# Patient Record
Sex: MALE | Race: BLACK OR AFRICAN AMERICAN | NOT HISPANIC OR LATINO | Employment: OTHER | URBAN - METROPOLITAN AREA
[De-identification: names, ages, dates, MRNs, and addresses within clinical notes are randomized per-mention and may not be internally consistent; named-entity substitution may affect disease eponyms.]

---

## 2019-12-16 ENCOUNTER — APPOINTMENT (OUTPATIENT)
Dept: CT IMAGING | Facility: HOSPITAL | Age: 74
End: 2019-12-16

## 2019-12-16 ENCOUNTER — APPOINTMENT (OUTPATIENT)
Dept: GENERAL RADIOLOGY | Facility: HOSPITAL | Age: 74
End: 2019-12-16

## 2019-12-16 ENCOUNTER — HOSPITAL ENCOUNTER (EMERGENCY)
Facility: HOSPITAL | Age: 74
Discharge: NURSING, REHAB OR LONG-TERM CARE | End: 2019-12-16
Attending: EMERGENCY MEDICINE | Admitting: EMERGENCY MEDICINE

## 2019-12-16 VITALS
OXYGEN SATURATION: 97 % | HEART RATE: 102 BPM | HEIGHT: 67 IN | BODY MASS INDEX: 18.83 KG/M2 | RESPIRATION RATE: 15 BRPM | DIASTOLIC BLOOD PRESSURE: 85 MMHG | TEMPERATURE: 97.9 F | WEIGHT: 120 LBS | SYSTOLIC BLOOD PRESSURE: 135 MMHG
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
## 2019-12-16 DIAGNOSIS — R53.1 RIGHT SIDED WEAKNESS: ICD-10-CM

## 2019-12-16 LAB
ALBUMIN SERPL-MCNC: 3.3 G/DL (ref 3.5–5.2)
ALBUMIN/GLOB SERPL: 0.8 G/DL
ALP SERPL-CCNC: 52 U/L (ref 39–117)
ALT SERPL W P-5'-P-CCNC: 16 U/L (ref 1–41)
ANION GAP SERPL CALCULATED.3IONS-SCNC: 13.4 MMOL/L (ref 5–15)
AST SERPL-CCNC: 39 U/L (ref 1–40)
BASOPHILS # BLD AUTO: 0.03 10*3/MM3 (ref 0–0.2)
BASOPHILS NFR BLD AUTO: 0.3 % (ref 0–1.5)
BILIRUB SERPL-MCNC: 0.4 MG/DL (ref 0.2–1.2)
BUN BLD-MCNC: 23 MG/DL (ref 8–23)
BUN/CREAT SERPL: 31.9 (ref 7–25)
CALCIUM SPEC-SCNC: 10.2 MG/DL (ref 8.6–10.5)
CHLORIDE SERPL-SCNC: 101 MMOL/L (ref 98–107)
CK SERPL-CCNC: 168 U/L (ref 20–200)
CO2 SERPL-SCNC: 25.6 MMOL/L (ref 22–29)
CREAT BLD-MCNC: 0.72 MG/DL (ref 0.76–1.27)
DEPRECATED RDW RBC AUTO: 41.6 FL (ref 37–54)
EOSINOPHIL # BLD AUTO: 0.02 10*3/MM3 (ref 0–0.4)
EOSINOPHIL NFR BLD AUTO: 0.2 % (ref 0.3–6.2)
ERYTHROCYTE [DISTWIDTH] IN BLOOD BY AUTOMATED COUNT: 13.7 % (ref 12.3–15.4)
GFR SERPL CREATININE-BSD FRML MDRD: 129 ML/MIN/1.73
GLOBULIN UR ELPH-MCNC: 4.4 GM/DL
GLUCOSE BLD-MCNC: 118 MG/DL (ref 65–99)
HCT VFR BLD AUTO: 35.8 % (ref 37.5–51)
HGB BLD-MCNC: 11.4 G/DL (ref 13–17.7)
IMM GRANULOCYTES # BLD AUTO: 0.04 10*3/MM3 (ref 0–0.05)
IMM GRANULOCYTES NFR BLD AUTO: 0.4 % (ref 0–0.5)
LYMPHOCYTES # BLD AUTO: 1.16 10*3/MM3 (ref 0.7–3.1)
LYMPHOCYTES NFR BLD AUTO: 11.9 % (ref 19.6–45.3)
MCH RBC QN AUTO: 26.7 PG (ref 26.6–33)
MCHC RBC AUTO-ENTMCNC: 31.8 G/DL (ref 31.5–35.7)
MCV RBC AUTO: 83.8 FL (ref 79–97)
MONOCYTES # BLD AUTO: 0.91 10*3/MM3 (ref 0.1–0.9)
MONOCYTES NFR BLD AUTO: 9.3 % (ref 5–12)
NEUTROPHILS # BLD AUTO: 7.61 10*3/MM3 (ref 1.7–7)
NEUTROPHILS NFR BLD AUTO: 77.9 % (ref 42.7–76)
NRBC BLD AUTO-RTO: 0.1 /100 WBC (ref 0–0.2)
PLATELET # BLD AUTO: 287 10*3/MM3 (ref 140–450)
PMV BLD AUTO: 11.6 FL (ref 6–12)
POTASSIUM BLD-SCNC: 5.3 MMOL/L (ref 3.5–5.2)
PROT SERPL-MCNC: 7.7 G/DL (ref 6–8.5)
RBC # BLD AUTO: 4.27 10*6/MM3 (ref 4.14–5.8)
SODIUM BLD-SCNC: 140 MMOL/L (ref 136–145)
WBC NRBC COR # BLD: 9.77 10*3/MM3 (ref 3.4–10.8)

## 2019-12-16 PROCEDURE — 70450 CT HEAD/BRAIN W/O DYE: CPT

## 2019-12-16 PROCEDURE — 85025 COMPLETE CBC W/AUTO DIFF WBC: CPT | Performed by: EMERGENCY MEDICINE

## 2019-12-16 PROCEDURE — 80053 COMPREHEN METABOLIC PANEL: CPT | Performed by: EMERGENCY MEDICINE

## 2019-12-16 PROCEDURE — 82550 ASSAY OF CK (CPK): CPT | Performed by: EMERGENCY MEDICINE

## 2019-12-16 PROCEDURE — 71045 X-RAY EXAM CHEST 1 VIEW: CPT

## 2019-12-16 PROCEDURE — 36415 COLL VENOUS BLD VENIPUNCTURE: CPT

## 2019-12-16 PROCEDURE — 99284 EMERGENCY DEPT VISIT MOD MDM: CPT

## 2019-12-16 PROCEDURE — 72170 X-RAY EXAM OF PELVIS: CPT

## 2019-12-16 RX ORDER — SODIUM CHLORIDE 0.9 % (FLUSH) 0.9 %
10 SYRINGE (ML) INJECTION AS NEEDED
Status: DISCONTINUED | OUTPATIENT
Start: 2019-12-16 | End: 2019-12-16

## 2019-12-16 NOTE — ED TRIAGE NOTES
Found on floor this am.  Nki.  Is on eliquis so nh sent him here.  Right side flacidity and aphasia at baseline

## 2019-12-16 NOTE — ED NOTES
Pt from [facility]. Was found on the floor by staff for an unwitnessed fall. Unknown amount of time spent on floor. Pt takes eliquis. Aphasic and left sided flacid at baseline. Pt awake and alert but unable to respond to questions      Dai Cason, RN  12/16/19 9265

## 2019-12-16 NOTE — ED PROVIDER NOTES
EMERGENCY DEPARTMENT ENCOUNTER    CHIEF COMPLAINT  Chief Complaint: fall  History given by: nursing home  History limited by: aphasia  Room Number: 06/06  PMD: Enrique Carver MD      HPI:  Pt is a 74 y.o. male with chronic R sided weakness and aphasia at baseline who presents to the ED via EMS after an unwitnessed fall, and pt was found by nursing home staff on the floor this morning. NH did not report pt having injury. They were not able to tell us how long pt was down. Pt is anticoagulated on Eliquis secondary to prior stroke. History limited by pt being nonverbal.    MEDICAL RECORD REVIEW    No prior records here.    PAST MEDICAL HISTORY  Active Ambulatory Problems     Diagnosis Date Noted   • No Active Ambulatory Problems     Resolved Ambulatory Problems     Diagnosis Date Noted   • No Resolved Ambulatory Problems     No Additional Past Medical History       PAST SURGICAL HISTORY  No past surgical history on file.    FAMILY HISTORY  No family history on file.    SOCIAL HISTORY  Social History     Socioeconomic History   • Marital status:      Spouse name: Not on file   • Number of children: Not on file   • Years of education: Not on file   • Highest education level: Not on file       ALLERGIES  Patient has no known allergies.    REVIEW OF SYSTEMS  Review of Systems   Unable to perform ROS: Patient nonverbal     All systems reviewed and negative except for those discussed in HPI.    PHYSICAL EXAM  ED Triage Vitals [12/16/19 1549]   Temp Heart Rate Resp BP SpO2   97.9 °F (36.6 °C) 91 18 122/76 99 %      Temp src Heart Rate Source Patient Position BP Location FiO2 (%)   Tympanic Monitor -- -- --       Physical Exam   Constitutional: No distress.   HENT:   Head: Normocephalic and atraumatic.   No signs of trauma   Eyes: Pupils are equal, round, and reactive to light. EOM are normal.   Neck: Normal range of motion. Neck supple.   Cardiovascular: Normal rate, regular rhythm and normal heart sounds.    Pulmonary/Chest: Effort normal and breath sounds normal. No respiratory distress.   Abdominal: Soft. There is no tenderness. There is no rebound and no guarding.   G tube in place   Musculoskeletal: Normal range of motion. He exhibits no edema or deformity.   Neurological: He is alert. He has normal sensation and normal strength.   Nonverbal. Pt does not move RUE or RLE. Does move LUE and LLE spontaneously.   Skin: Skin is warm and dry.   Several superficial ulcers to L 1st, 2nd, and 3rd toes without signs of infection. Fungating growth on R side of face.   Psychiatric: Mood and affect normal.   Nursing note and vitals reviewed.      LAB RESULTS  Lab Results (last 24 hours)     Procedure Component Value Units Date/Time    CBC & Differential [[id number]] Collected:  12/16/19 1817    Specimen:  Blood Updated:  12/16/19 1833    Narrative:       The following orders were created for panel order CBC & Differential.  Procedure                               Abnormality         Status                     ---------                               -----------         ------                     CBC Auto Differential[[id number]]        Abnormal            Final result                 Please view results for these tests on the individual orders.    Comprehensive Metabolic Panel [[id number]]  (Abnormal) Collected:  12/16/19 1817    Specimen:  Blood Updated:  12/16/19 1900     Glucose 118 mg/dL      BUN 23 mg/dL      Creatinine 0.72 mg/dL      Sodium 140 mmol/L      Potassium 5.3 mmol/L      Comment: Specimen hemolyzed.  Results may be affected.        Chloride 101 mmol/L      CO2 25.6 mmol/L      Calcium 10.2 mg/dL      Total Protein 7.7 g/dL      Albumin 3.30 g/dL      ALT (SGPT) 16 U/L      Comment: Specimen hemolyzed.  Results may be affected.        AST (SGOT) 39 U/L      Comment: Specimen hemolyzed.  Results may be affected.        Alkaline Phosphatase 52 U/L      Total Bilirubin 0.4 mg/dL      eGFR   Amer 129  mL/min/1.73      Globulin 4.4 gm/dL      A/G Ratio 0.8 g/dL      BUN/Creatinine Ratio 31.9     Anion Gap 13.4 mmol/L     Narrative:       GFR Normal >60  Chronic Kidney Disease <60  Kidney Failure <15      CK [[id number]]  (Normal) Collected:  12/16/19 1817    Specimen:  Blood Updated:  12/16/19 1900     Creatine Kinase 168 U/L      Comment: Specimen hemolyzed.  Results may be affected.       CBC Auto Differential [[id number]]  (Abnormal) Collected:  12/16/19 1817    Specimen:  Blood Updated:  12/16/19 1833     WBC 9.77 10*3/mm3      RBC 4.27 10*6/mm3      Hemoglobin 11.4 g/dL      Hematocrit 35.8 %      MCV 83.8 fL      MCH 26.7 pg      MCHC 31.8 g/dL      RDW 13.7 %      RDW-SD 41.6 fl      MPV 11.6 fL      Platelets 287 10*3/mm3      Neutrophil % 77.9 %      Lymphocyte % 11.9 %      Monocyte % 9.3 %      Eosinophil % 0.2 %      Basophil % 0.3 %      Immature Grans % 0.4 %      Neutrophils, Absolute 7.61 10*3/mm3      Lymphocytes, Absolute 1.16 10*3/mm3      Monocytes, Absolute 0.91 10*3/mm3      Eosinophils, Absolute 0.02 10*3/mm3      Basophils, Absolute 0.03 10*3/mm3      Immature Grans, Absolute 0.04 10*3/mm3      nRBC 0.1 /100 WBC           I ordered the above labs and reviewed the results.    RADIOLOGY  CT Head Without Contrast   Final Result       No acute intracranial hemorrhage is identified within limitations   associated with motion artifact. Chronic changes of the brain. If there   is further clinical concern, MRI could be considered for further   evaluation.       Discussed by telephone with Dr. Che at 1925, 12/16/2019.       This report was finalized on 12/16/2019 7:25 PM by Dr. James Royal M.D.          XR Pelvis 1 or 2 View   Final Result       No obvious displaced fracture is noted on the single view of the pelvis.  If there is clinical suspicion for fracture, additional views and/or  cross-sectional imaging can be obtained. No dislocation is noted.  Degenerative subcortical cystic  change is apparent at the right  acetabulum. Degenerative changes are apparent at the partly included  lower lumbar spine. Arterial calcification is evident.       This report was finalized on 12/16/2019 6:56 PM by Dr. James Royal M.D.          XR Chest 1 View   Final Result   No focal pulmonary consolidation. Tortuous aorta. Follow-up   as clinically indicated.       This report was finalized on 12/16/2019 6:55 PM by Dr. aJmes Royal M.D.               I ordered the above noted radiological studies. Interpreted by radiologist. Discussed with radiologist (Dr. Royal). Reviewed by me in PACS. See dictation for official radiology interpretation.    PROGRESS AND CONSULTS    ED Course as of Dec 16 2045   Mon Dec 16, 2019   2043 Specimens hemolyzed   Potassium(!): 5.3 [WH]      ED Course User Index  [WH] Gaurang Che MD     1749- HR 91. O2 sat 99% on RA. /76. Discussed with pt plan to obtain labs and imaging of head, pelvis, and chest, as well as start pt on IVF and Lactated Ringers Bolus. Pt understands and agrees with the plan, all questions answered. Ordered labs, CT Head, XR Pelvis, and XR Chest for further evaluation. Also ordered IVF and Lactated Ringers Bolus for symptom management.    2034- Rechecked pt. Pt is resting comfortably, with niece (CARLEE) at bedside. Niece reports that pt recently had stroke, and [facility]. She confirms that pt is at baseline. Notified pt of negative acute imaging and slight anemia. Discussed the plan to discharge the pt home. I instructed the pt to follow up with PCP. Pt understands and agrees with the plan, all questions answered.    MEDICAL DECISION MAKING  Results were reviewed/discussed with the patient and they were also made aware of online access. Pt also made aware that some labs, such as cultures, will not be resulted during ER visit and follow up with PMD is necessary.          DIAGNOSIS  Final diagnoses:   Fall,  initial encounter   Right sided weakness       DISPOSITION  DISCHARGE    Patient discharged in stable condition.    Reviewed implications of results, diagnosis, meds, responsibility to follow up, warning signs and symptoms of possible worsening, potential complications and reasons to return to ER, including worsening symptoms.    Patient/Family voiced understanding of above instructions.    Discussed plan for discharge, as there is no emergent indication for admission. Patient referred to primary care provider for BP management due to today's BP. Pt/family is agreeable and understands need for follow up and repeat testing.  Pt is aware that discharge does not mean that nothing is wrong but it indicates no emergency is present that requires admission and they must continue care with follow-up as given below or physician of their choice.     FOLLOW-UP  Enrique Carver MD  [address] [id number] [phone]    Schedule an appointment as soon as possible for a visit   As needed         Medication List      No changes were made to your prescriptions during this visit.           Latest Documented Vital Signs:  As of 8:45 PM  BP- 128/84 HR- 91 Temp- 97.9 °F (36.6 °C) (Tympanic) O2 sat- 95%    --  Documentation assistance provided by candida Johnson for Dr. Che.  Information recorded by the scribe was done at my direction and has been verified and validated by me.     Janna Johnson  12/16/19 2045       Gaurang Che MD  12/16/19 5750

## 2019-12-17 NOTE — ED NOTES
[facility] Unsuccessful in getting anyone to  phone [phone] attempts. [phone].     Christen Mendez, RN  12/16/19 4846

## 2019-12-17 NOTE — ED NOTES
Awaiting [facility] for transport. Per dispatcher Domingo, NEREIDA [phone].     Christen Mendez, RN  12/16/19 2112
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
## 2019-12-17 NOTE — ED NOTES
Pt cleaned,brief changed and linens changed after pt had a bm. Pt repositioned for comfort after change.     Christen Mendez, RN  12/16/19 8049

## 2020-01-02 ENCOUNTER — APPOINTMENT (OUTPATIENT)
Dept: GENERAL RADIOLOGY | Facility: HOSPITAL | Age: 75
End: 2020-01-02

## 2020-01-02 ENCOUNTER — HOSPITAL ENCOUNTER (OUTPATIENT)
Facility: HOSPITAL | Age: 75
Setting detail: OBSERVATION
LOS: 1 days | Discharge: NURSING, REHAB OR LONG-TERM CARE | End: 2020-01-06
Attending: EMERGENCY MEDICINE | Admitting: HOSPITALIST

## 2020-01-02 DIAGNOSIS — D68.9 COAGULOPATHY (HCC): ICD-10-CM

## 2020-01-02 DIAGNOSIS — I69.959 HEMIPLEGIA AS LATE EFFECT OF CEREBROVASCULAR DISEASE, UNSPECIFIED CEREBROVASCULAR DISEASE TYPE, UNSPECIFIED HEMIPLEGIA TYPE, UNSPECIFIED LATERALITY (HCC): ICD-10-CM

## 2020-01-02 DIAGNOSIS — R13.10 DYSPHAGIA, UNSPECIFIED TYPE: ICD-10-CM

## 2020-01-02 DIAGNOSIS — R47.01 APHASIA: ICD-10-CM

## 2020-01-02 DIAGNOSIS — K92.2 GASTROINTESTINAL HEMORRHAGE, UNSPECIFIED GASTROINTESTINAL HEMORRHAGE TYPE: Primary | ICD-10-CM

## 2020-01-02 PROBLEM — E11.69 TYPE 2 DIABETES MELLITUS WITH OTHER SPECIFIED COMPLICATION (HCC): Status: ACTIVE | Noted: 2020-01-02

## 2020-01-02 PROBLEM — Z79.01 LONG TERM CURRENT USE OF ANTICOAGULANT THERAPY: Status: ACTIVE | Noted: 2020-01-02

## 2020-01-02 PROBLEM — A04.72 CLOSTRIDIUM DIFFICILE ENTEROCOLITIS: Status: ACTIVE | Noted: 2020-01-02

## 2020-01-02 PROBLEM — I69.30 SEQUELAE, POST-STROKE: Status: ACTIVE | Noted: 2020-01-02

## 2020-01-02 LAB
ABO GROUP BLD: NORMAL
ALBUMIN SERPL-MCNC: 3.2 G/DL (ref 3.5–5.2)
ALBUMIN/GLOB SERPL: 0.8 G/DL
ALP SERPL-CCNC: 72 U/L (ref 39–117)
ALT SERPL W P-5'-P-CCNC: 22 U/L (ref 1–41)
ANION GAP SERPL CALCULATED.3IONS-SCNC: 10.5 MMOL/L (ref 5–15)
AST SERPL-CCNC: 19 U/L (ref 1–40)
BASOPHILS # BLD AUTO: 0.03 10*3/MM3 (ref 0–0.2)
BASOPHILS NFR BLD AUTO: 0.3 % (ref 0–1.5)
BILIRUB SERPL-MCNC: 0.2 MG/DL (ref 0.2–1.2)
BLD GP AB SCN SERPL QL: NEGATIVE
BUN BLD-MCNC: 17 MG/DL (ref 8–23)
BUN/CREAT SERPL: 25 (ref 7–25)
CALCIUM SPEC-SCNC: 9.3 MG/DL (ref 8.6–10.5)
CHLORIDE SERPL-SCNC: 101 MMOL/L (ref 98–107)
CO2 SERPL-SCNC: 29.5 MMOL/L (ref 22–29)
CREAT BLD-MCNC: 0.68 MG/DL (ref 0.76–1.27)
D-LACTATE SERPL-SCNC: 1.4 MMOL/L (ref 0.5–2)
DEPRECATED RDW RBC AUTO: 38.7 FL (ref 37–54)
EOSINOPHIL # BLD AUTO: 0.14 10*3/MM3 (ref 0–0.4)
EOSINOPHIL NFR BLD AUTO: 1.4 % (ref 0.3–6.2)
ERYTHROCYTE [DISTWIDTH] IN BLOOD BY AUTOMATED COUNT: 13.2 % (ref 12.3–15.4)
GFR SERPL CREATININE-BSD FRML MDRD: 138 ML/MIN/1.73
GLOBULIN UR ELPH-MCNC: 3.8 GM/DL
GLUCOSE BLD-MCNC: 145 MG/DL (ref 65–99)
HCT VFR BLD AUTO: 33.5 % (ref 37.5–51)
HGB BLD-MCNC: 11.3 G/DL (ref 13–17.7)
IMM GRANULOCYTES # BLD AUTO: 0.05 10*3/MM3 (ref 0–0.05)
IMM GRANULOCYTES NFR BLD AUTO: 0.5 % (ref 0–0.5)
INR PPP: 1.19 (ref 0.9–1.1)
LYMPHOCYTES # BLD AUTO: 1.24 10*3/MM3 (ref 0.7–3.1)
LYMPHOCYTES NFR BLD AUTO: 12.3 % (ref 19.6–45.3)
MAGNESIUM SERPL-MCNC: 2.1 MG/DL (ref 1.6–2.4)
MCH RBC QN AUTO: 27.4 PG (ref 26.6–33)
MCHC RBC AUTO-ENTMCNC: 33.7 G/DL (ref 31.5–35.7)
MCV RBC AUTO: 81.1 FL (ref 79–97)
MONOCYTES # BLD AUTO: 1.09 10*3/MM3 (ref 0.1–0.9)
MONOCYTES NFR BLD AUTO: 10.8 % (ref 5–12)
NEUTROPHILS # BLD AUTO: 7.5 10*3/MM3 (ref 1.7–7)
NEUTROPHILS NFR BLD AUTO: 74.7 % (ref 42.7–76)
NRBC BLD AUTO-RTO: 0 /100 WBC (ref 0–0.2)
PLATELET # BLD AUTO: 321 10*3/MM3 (ref 140–450)
PMV BLD AUTO: 11.7 FL (ref 6–12)
POTASSIUM BLD-SCNC: 4 MMOL/L (ref 3.5–5.2)
PROT SERPL-MCNC: 7 G/DL (ref 6–8.5)
PROTHROMBIN TIME: 14.8 SECONDS (ref 11.7–14.2)
RBC # BLD AUTO: 4.13 10*6/MM3 (ref 4.14–5.8)
RH BLD: POSITIVE
SODIUM BLD-SCNC: 141 MMOL/L (ref 136–145)
T&S EXPIRATION DATE: NORMAL
WBC NRBC COR # BLD: 10.05 10*3/MM3 (ref 3.4–10.8)

## 2020-01-02 PROCEDURE — 96361 HYDRATE IV INFUSION ADD-ON: CPT

## 2020-01-02 PROCEDURE — 93005 ELECTROCARDIOGRAM TRACING: CPT | Performed by: INTERNAL MEDICINE

## 2020-01-02 PROCEDURE — 96374 THER/PROPH/DIAG INJ IV PUSH: CPT

## 2020-01-02 PROCEDURE — 86901 BLOOD TYPING SEROLOGIC RH(D): CPT | Performed by: EMERGENCY MEDICINE

## 2020-01-02 PROCEDURE — 80053 COMPREHEN METABOLIC PANEL: CPT | Performed by: EMERGENCY MEDICINE

## 2020-01-02 PROCEDURE — 85018 HEMOGLOBIN: CPT | Performed by: INTERNAL MEDICINE

## 2020-01-02 PROCEDURE — 36415 COLL VENOUS BLD VENIPUNCTURE: CPT | Performed by: INTERNAL MEDICINE

## 2020-01-02 PROCEDURE — 99284 EMERGENCY DEPT VISIT MOD MDM: CPT

## 2020-01-02 PROCEDURE — 86900 BLOOD TYPING SEROLOGIC ABO: CPT | Performed by: EMERGENCY MEDICINE

## 2020-01-02 PROCEDURE — 83735 ASSAY OF MAGNESIUM: CPT | Performed by: EMERGENCY MEDICINE

## 2020-01-02 PROCEDURE — 85014 HEMATOCRIT: CPT | Performed by: INTERNAL MEDICINE

## 2020-01-02 PROCEDURE — 83605 ASSAY OF LACTIC ACID: CPT | Performed by: INTERNAL MEDICINE

## 2020-01-02 PROCEDURE — 85610 PROTHROMBIN TIME: CPT | Performed by: EMERGENCY MEDICINE

## 2020-01-02 PROCEDURE — 86850 RBC ANTIBODY SCREEN: CPT | Performed by: EMERGENCY MEDICINE

## 2020-01-02 PROCEDURE — 85025 COMPLETE CBC W/AUTO DIFF WBC: CPT | Performed by: EMERGENCY MEDICINE

## 2020-01-02 PROCEDURE — 74022 RADEX COMPL AQT ABD SERIES: CPT

## 2020-01-02 RX ORDER — TERAZOSIN 2 MG/1
2 CAPSULE ORAL NIGHTLY
Status: DISCONTINUED | OUTPATIENT
Start: 2020-01-03 | End: 2020-01-06 | Stop reason: HOSPADM

## 2020-01-02 RX ORDER — DEXTROSE MONOHYDRATE 25 G/50ML
25 INJECTION, SOLUTION INTRAVENOUS
Status: DISCONTINUED | OUTPATIENT
Start: 2020-01-02 | End: 2020-01-06 | Stop reason: HOSPADM

## 2020-01-02 RX ORDER — IPRATROPIUM BROMIDE AND ALBUTEROL SULFATE 2.5; .5 MG/3ML; MG/3ML
3 SOLUTION RESPIRATORY (INHALATION) EVERY 6 HOURS PRN
Status: DISCONTINUED | OUTPATIENT
Start: 2020-01-02 | End: 2020-01-06 | Stop reason: HOSPADM

## 2020-01-02 RX ORDER — ACETAMINOPHEN 650 MG/1
650 SUPPOSITORY RECTAL EVERY 4 HOURS PRN
Status: DISCONTINUED | OUTPATIENT
Start: 2020-01-02 | End: 2020-01-06 | Stop reason: HOSPADM

## 2020-01-02 RX ORDER — ACETAMINOPHEN 160 MG/5ML
650 SOLUTION ORAL EVERY 6 HOURS PRN
COMMUNITY
End: 2021-03-02 | Stop reason: HOSPADM

## 2020-01-02 RX ORDER — ASPIRIN 81 MG/1
81 TABLET, CHEWABLE ORAL DAILY
COMMUNITY
End: 2020-01-06 | Stop reason: HOSPADM

## 2020-01-02 RX ORDER — ACETAMINOPHEN 325 MG/1
650 TABLET ORAL EVERY 4 HOURS PRN
Status: DISCONTINUED | OUTPATIENT
Start: 2020-01-02 | End: 2020-01-06 | Stop reason: HOSPADM

## 2020-01-02 RX ORDER — VANCOMYCIN HYDROCHLORIDE 125 MG/1
125 CAPSULE ORAL EVERY 6 HOURS
COMMUNITY
End: 2020-01-06 | Stop reason: HOSPADM

## 2020-01-02 RX ORDER — ACETAMINOPHEN 650 MG
TABLET, EXTENDED RELEASE ORAL DAILY
COMMUNITY
End: 2021-02-24

## 2020-01-02 RX ORDER — SODIUM CHLORIDE 0.9 % (FLUSH) 0.9 %
10 SYRINGE (ML) INJECTION EVERY 12 HOURS SCHEDULED
Status: DISCONTINUED | OUTPATIENT
Start: 2020-01-02 | End: 2020-01-06 | Stop reason: HOSPADM

## 2020-01-02 RX ORDER — ONDANSETRON 4 MG/1
4 TABLET, FILM COATED ORAL EVERY 6 HOURS PRN
Status: DISCONTINUED | OUTPATIENT
Start: 2020-01-02 | End: 2020-01-06 | Stop reason: HOSPADM

## 2020-01-02 RX ORDER — SODIUM CHLORIDE 0.9 % (FLUSH) 0.9 %
10 SYRINGE (ML) INJECTION AS NEEDED
Status: DISCONTINUED | OUTPATIENT
Start: 2020-01-02 | End: 2020-01-06 | Stop reason: HOSPADM

## 2020-01-02 RX ORDER — SODIUM CHLORIDE 9 MG/ML
100 INJECTION, SOLUTION INTRAVENOUS CONTINUOUS
Status: DISCONTINUED | OUTPATIENT
Start: 2020-01-02 | End: 2020-01-06 | Stop reason: HOSPADM

## 2020-01-02 RX ORDER — NICOTINE POLACRILEX 4 MG
15 LOZENGE BUCCAL
Status: DISCONTINUED | OUTPATIENT
Start: 2020-01-02 | End: 2020-01-06 | Stop reason: HOSPADM

## 2020-01-02 RX ORDER — ATORVASTATIN CALCIUM 80 MG/1
80 TABLET, FILM COATED ORAL NIGHTLY
Status: DISCONTINUED | OUTPATIENT
Start: 2020-01-03 | End: 2020-01-06 | Stop reason: HOSPADM

## 2020-01-02 RX ORDER — IPRATROPIUM BROMIDE AND ALBUTEROL SULFATE 2.5; .5 MG/3ML; MG/3ML
3 SOLUTION RESPIRATORY (INHALATION) EVERY 4 HOURS PRN
COMMUNITY
End: 2021-02-24

## 2020-01-02 RX ORDER — SODIUM HYPOCHLORITE 1.25 MG/ML
SOLUTION TOPICAL EVERY 12 HOURS SCHEDULED
Status: DISCONTINUED | OUTPATIENT
Start: 2020-01-03 | End: 2020-01-06 | Stop reason: HOSPADM

## 2020-01-02 RX ORDER — PANTOPRAZOLE SODIUM 40 MG/10ML
40 INJECTION, POWDER, LYOPHILIZED, FOR SOLUTION INTRAVENOUS
Status: DISCONTINUED | OUTPATIENT
Start: 2020-01-02 | End: 2020-01-06 | Stop reason: HOSPADM

## 2020-01-02 RX ORDER — SODIUM CHLORIDE 9 MG/ML
125 INJECTION, SOLUTION INTRAVENOUS CONTINUOUS
Status: DISCONTINUED | OUTPATIENT
Start: 2020-01-02 | End: 2020-01-02

## 2020-01-02 RX ORDER — ATORVASTATIN CALCIUM 80 MG/1
80 TABLET, FILM COATED ORAL NIGHTLY
COMMUNITY
End: 2021-02-24

## 2020-01-02 RX ORDER — ACETAMINOPHEN 160 MG/5ML
650 SOLUTION ORAL EVERY 4 HOURS PRN
Status: DISCONTINUED | OUTPATIENT
Start: 2020-01-02 | End: 2020-01-06 | Stop reason: HOSPADM

## 2020-01-02 RX ORDER — TERAZOSIN 2 MG/1
2 CAPSULE ORAL NIGHTLY
COMMUNITY
End: 2021-02-24

## 2020-01-02 RX ORDER — ACETAMINOPHEN 650 MG
TABLET, EXTENDED RELEASE ORAL DAILY
Status: DISCONTINUED | OUTPATIENT
Start: 2020-01-03 | End: 2020-01-06 | Stop reason: HOSPADM

## 2020-01-02 RX ORDER — ONDANSETRON 2 MG/ML
4 INJECTION INTRAMUSCULAR; INTRAVENOUS EVERY 6 HOURS PRN
Status: DISCONTINUED | OUTPATIENT
Start: 2020-01-02 | End: 2020-01-06 | Stop reason: HOSPADM

## 2020-01-02 RX ADMIN — SODIUM CHLORIDE 100 ML/HR: 9 INJECTION, SOLUTION INTRAVENOUS at 23:50

## 2020-01-02 RX ADMIN — SODIUM CHLORIDE 125 ML/HR: 9 INJECTION, SOLUTION INTRAVENOUS at 16:41

## 2020-01-02 RX ADMIN — PANTOPRAZOLE SODIUM 40 MG: 40 INJECTION, POWDER, FOR SOLUTION INTRAVENOUS at 23:49

## 2020-01-02 RX ADMIN — VANCOMYCIN 125 MG: KIT at 23:50

## 2020-01-02 RX ADMIN — SODIUM CHLORIDE, PRESERVATIVE FREE 10 ML: 5 INJECTION INTRAVENOUS at 23:51

## 2020-01-02 NOTE — ED PROVIDER NOTES
EMERGENCY DEPARTMENT ENCOUNTER    CHIEF COMPLAINT  Chief Complaint: Rectal bleeding   History given by: EMS  History limited by: Pt is non-verbal   Room Number: 30/30  PMD: Enrique Carver MD      HPI:  Pt is a 74 y.o. male who presents via EMS from rehab complaining of rectal bleeding that was noticed PTA by an aid who was changing his diaper. Per EMS, they were called by rehab facility when an aid noticed dark red blood during a diaper change. EMS reports that the facility was unsure of amount or if bleeding was new. EMS states pt has been in contact isolation as he is being treated for C-diff. Per EMS, pt is non-verbal at baseline and has chronic R sided weakness from a previous CVA.     Duration:  Noticed PTA  Onset: Gradual  Timing: Unknown  Location: Rectal   Radiation: N/a  Quality: Rectal bleeding   Intensity/Severity: Moderate  Progression: Unchanged   Associated Symptoms: None  Aggravating Factors: None  Alleviating Factors: None  Previous Episodes: None  Treatment before arrival: None    PAST MEDICAL HISTORY  Active Ambulatory Problems     Diagnosis Date Noted   • No Active Ambulatory Problems     Resolved Ambulatory Problems     Diagnosis Date Noted   • No Resolved Ambulatory Problems     No Additional Past Medical History       PAST SURGICAL HISTORY  No past surgical history on file.    FAMILY HISTORY  No family history on file.    SOCIAL HISTORY  Social History     Socioeconomic History   • Marital status:      Spouse name: Not on file   • Number of children: Not on file   • Years of education: Not on file   • Highest education level: Not on file       ALLERGIES  Patient has no known allergies.    REVIEW OF SYSTEMS  Review of Systems   Unable to perform ROS: Patient nonverbal       PHYSICAL EXAM  ED Triage Vitals [01/02/20 1536]   Temp Heart Rate Resp BP SpO2   98.2 °F (36.8 °C) 98 18 143/74 97 %      Temp src Heart Rate Source Patient Position BP Location FiO2 (%)   Tympanic Monitor --  -- --       Physical Exam   Constitutional: He is oriented to person, place, and time. No distress.   HENT:   Head: Normocephalic and atraumatic.   Elderly, frail, chronically ill appearing, cachectic, diffuse muscle wasting   Eyes: Pupils are equal, round, and reactive to light. EOM are normal.   Neck: Normal range of motion. Neck supple.   Cardiovascular: Normal rate, regular rhythm and normal heart sounds.   Pulmonary/Chest: Effort normal and breath sounds normal. No respiratory distress.   Abdominal: Soft. There is no tenderness. There is no rebound and no guarding.   G-tube in place   Genitourinary:   Genitourinary Comments: Bloody liquid stool, light brown and loose on rectal exam   Musculoskeletal: Normal range of motion. He exhibits no edema.   Neurological: He is alert and oriented to person, place, and time. He has normal sensation and normal strength.   Skin: Skin is warm and dry.   Psychiatric: Mood and affect normal.   Nursing note and vitals reviewed.      LAB RESULTS  Lab Results (last 24 hours)     Procedure Component Value Units Date/Time    CBC & Differential [[id number]] Collected:  01/02/20 1604    Specimen:  Blood Updated:  01/02/20 1628    Narrative:       The following orders were created for panel order CBC & Differential.  Procedure                               Abnormality         Status                     ---------                               -----------         ------                     CBC Auto Differential[[id number]]        Abnormal            Final result                 Please view results for these tests on the individual orders.    Protime-INR [[id number]]  (Abnormal) Collected:  01/02/20 1604    Specimen:  Blood Updated:  01/02/20 1632     Protime 14.8 Seconds      INR 1.19    Comprehensive Metabolic Panel [[id number]]  (Abnormal) Collected:  01/02/20 1604    Specimen:  Blood Updated:  01/02/20 1655     Glucose 145 mg/dL      BUN 17 mg/dL      Creatinine 0.68 mg/dL      Sodium 141  mmol/L      Potassium 4.0 mmol/L      Chloride 101 mmol/L      CO2 29.5 mmol/L      Calcium 9.3 mg/dL      Total Protein 7.0 g/dL      Albumin 3.20 g/dL      ALT (SGPT) 22 U/L      AST (SGOT) 19 U/L      Alkaline Phosphatase 72 U/L      Total Bilirubin 0.2 mg/dL      eGFR   Amer 138 mL/min/1.73      Globulin 3.8 gm/dL      A/G Ratio 0.8 g/dL      BUN/Creatinine Ratio 25.0     Anion Gap 10.5 mmol/L     Narrative:       GFR Normal >60  Chronic Kidney Disease <60  Kidney Failure <15      Magnesium [[id number]]  (Normal) Collected:  01/02/20 1604    Specimen:  Blood Updated:  01/02/20 1648     Magnesium 2.1 mg/dL     CBC Auto Differential [[id number]]  (Abnormal) Collected:  01/02/20 1604    Specimen:  Blood Updated:  01/02/20 1628     WBC 10.05 10*3/mm3      RBC 4.13 10*6/mm3      Hemoglobin 11.3 g/dL      Hematocrit 33.5 %      MCV 81.1 fL      MCH 27.4 pg      MCHC 33.7 g/dL      RDW 13.2 %      RDW-SD 38.7 fl      MPV 11.7 fL      Platelets 321 10*3/mm3      Neutrophil % 74.7 %      Lymphocyte % 12.3 %      Monocyte % 10.8 %      Eosinophil % 1.4 %      Basophil % 0.3 %      Immature Grans % 0.5 %      Neutrophils, Absolute 7.50 10*3/mm3      Lymphocytes, Absolute 1.24 10*3/mm3      Monocytes, Absolute 1.09 10*3/mm3      Eosinophils, Absolute 0.14 10*3/mm3      Basophils, Absolute 0.03 10*3/mm3      Immature Grans, Absolute 0.05 10*3/mm3      nRBC 0.0 /100 WBC           I ordered the above labs and reviewed the results      PROCEDURES  Procedures      PROGRESS AND CONSULTS  ED Course as of Jan 02 1927   Thu Jan 02, 2020 1842 Patient's status is unchanged.  Laying in bed.  Patient's blood pressure is normal with an elevation is diastolic mildly.  Patient's heart rate is 90 to low 100s.  No family or friend has been present in the ER    [MM]   1851 I spoke with Dr. Messer for management of the patient's status and lab work.  He agrees to admit the patient to the hospital    [MM]      ED Course User Index  [MM]  Rod Naidu MD           MEDICAL DECISION MAKING  Results were reviewed/discussed with the patient and they were also made aware of online access. Pt also made aware that some labs, such as cultures, will not be resulted during ER visit and follow up with PMD is necessary.     MDM  Number of Diagnoses or Management Options     Amount and/or Complexity of Data Reviewed  Clinical lab tests: ordered and reviewed  Decide to obtain previous medical records or to obtain history from someone other than the patient: yes (Epic)  Review and summarize past medical records: yes (Pt was seen in the ED on 12/16/19. 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 Information recorded by the scribe was done at my direction and has been verified and validated by me.                Melani Coyne  01/02/20 1816       Rod Naidu MD  01/02/20 1927

## 2020-01-03 PROBLEM — E43 SEVERE MALNUTRITION (HCC): Status: ACTIVE | Noted: 2020-01-03

## 2020-01-03 LAB
ALBUMIN SERPL-MCNC: 3.1 G/DL (ref 3.5–5.2)
ALBUMIN/GLOB SERPL: 1 G/DL
ALP SERPL-CCNC: 64 U/L (ref 39–117)
ALT SERPL W P-5'-P-CCNC: 18 U/L (ref 1–41)
ANION GAP SERPL CALCULATED.3IONS-SCNC: 11.9 MMOL/L (ref 5–15)
AST SERPL-CCNC: 17 U/L (ref 1–40)
BASOPHILS # BLD AUTO: 0.04 10*3/MM3 (ref 0–0.2)
BASOPHILS NFR BLD AUTO: 0.5 % (ref 0–1.5)
BILIRUB SERPL-MCNC: 0.3 MG/DL (ref 0.2–1.2)
BUN BLD-MCNC: 15 MG/DL (ref 8–23)
BUN/CREAT SERPL: 21.7 (ref 7–25)
CALCIUM SPEC-SCNC: 8.7 MG/DL (ref 8.6–10.5)
CHLORIDE SERPL-SCNC: 104 MMOL/L (ref 98–107)
CO2 SERPL-SCNC: 26.1 MMOL/L (ref 22–29)
CREAT BLD-MCNC: 0.69 MG/DL (ref 0.76–1.27)
DEPRECATED RDW RBC AUTO: 40.1 FL (ref 37–54)
EOSINOPHIL # BLD AUTO: 0.1 10*3/MM3 (ref 0–0.4)
EOSINOPHIL NFR BLD AUTO: 1.3 % (ref 0.3–6.2)
ERYTHROCYTE [DISTWIDTH] IN BLOOD BY AUTOMATED COUNT: 13.1 % (ref 12.3–15.4)
FERRITIN SERPL-MCNC: 115 NG/ML (ref 30–400)
GFR SERPL CREATININE-BSD FRML MDRD: 135 ML/MIN/1.73
GLOBULIN UR ELPH-MCNC: 3.2 GM/DL
GLUCOSE BLD-MCNC: 129 MG/DL (ref 65–99)
GLUCOSE BLDC GLUCOMTR-MCNC: 113 MG/DL (ref 70–130)
GLUCOSE BLDC GLUCOMTR-MCNC: 115 MG/DL (ref 70–130)
GLUCOSE BLDC GLUCOMTR-MCNC: 115 MG/DL (ref 70–130)
GLUCOSE BLDC GLUCOMTR-MCNC: 127 MG/DL (ref 70–130)
GLUCOSE BLDC GLUCOMTR-MCNC: 98 MG/DL (ref 70–130)
HBA1C MFR BLD: 6.96 % (ref 4.8–5.6)
HCT VFR BLD AUTO: 32.9 % (ref 37.5–51)
HCT VFR BLD AUTO: 32.9 % (ref 37.5–51)
HCT VFR BLD AUTO: 34.2 % (ref 37.5–51)
HCT VFR BLD AUTO: 34.5 % (ref 37.5–51)
HGB BLD-MCNC: 10.4 G/DL (ref 13–17.7)
HGB BLD-MCNC: 10.4 G/DL (ref 13–17.7)
HGB BLD-MCNC: 10.9 G/DL (ref 13–17.7)
HGB BLD-MCNC: 11.4 G/DL (ref 13–17.7)
IMM GRANULOCYTES # BLD AUTO: 0.03 10*3/MM3 (ref 0–0.05)
IMM GRANULOCYTES NFR BLD AUTO: 0.4 % (ref 0–0.5)
INR PPP: 1.17 (ref 0.9–1.1)
IRON 24H UR-MRATE: 31 MCG/DL (ref 59–158)
IRON SATN MFR SERPL: 13 % (ref 20–50)
LYMPHOCYTES # BLD AUTO: 1.48 10*3/MM3 (ref 0.7–3.1)
LYMPHOCYTES NFR BLD AUTO: 19.2 % (ref 19.6–45.3)
MAGNESIUM SERPL-MCNC: 2 MG/DL (ref 1.6–2.4)
MCH RBC QN AUTO: 26.3 PG (ref 26.6–33)
MCHC RBC AUTO-ENTMCNC: 31.6 G/DL (ref 31.5–35.7)
MCV RBC AUTO: 83.1 FL (ref 79–97)
MONOCYTES # BLD AUTO: 0.89 10*3/MM3 (ref 0.1–0.9)
MONOCYTES NFR BLD AUTO: 11.5 % (ref 5–12)
NEUTROPHILS # BLD AUTO: 5.17 10*3/MM3 (ref 1.7–7)
NEUTROPHILS NFR BLD AUTO: 67.1 % (ref 42.7–76)
NRBC BLD AUTO-RTO: 0.1 /100 WBC (ref 0–0.2)
PHOSPHATE SERPL-MCNC: 4 MG/DL (ref 2.5–4.5)
PLATELET # BLD AUTO: 321 10*3/MM3 (ref 140–450)
PMV BLD AUTO: 12.1 FL (ref 6–12)
POTASSIUM BLD-SCNC: 4 MMOL/L (ref 3.5–5.2)
PROT SERPL-MCNC: 6.3 G/DL (ref 6–8.5)
PROTHROMBIN TIME: 14.6 SECONDS (ref 11.7–14.2)
RBC # BLD AUTO: 3.96 10*6/MM3 (ref 4.14–5.8)
SODIUM BLD-SCNC: 142 MMOL/L (ref 136–145)
TIBC SERPL-MCNC: 237 MCG/DL (ref 298–536)
TRANSFERRIN SERPL-MCNC: 159 MG/DL (ref 200–360)
WBC NRBC COR # BLD: 7.71 10*3/MM3 (ref 3.4–10.8)

## 2020-01-03 PROCEDURE — G0378 HOSPITAL OBSERVATION PER HR: HCPCS

## 2020-01-03 PROCEDURE — 80053 COMPREHEN METABOLIC PANEL: CPT | Performed by: INTERNAL MEDICINE

## 2020-01-03 PROCEDURE — 82728 ASSAY OF FERRITIN: CPT | Performed by: INTERNAL MEDICINE

## 2020-01-03 PROCEDURE — 85018 HEMOGLOBIN: CPT | Performed by: INTERNAL MEDICINE

## 2020-01-03 PROCEDURE — 96376 TX/PRO/DX INJ SAME DRUG ADON: CPT

## 2020-01-03 PROCEDURE — 82962 GLUCOSE BLOOD TEST: CPT

## 2020-01-03 PROCEDURE — 85014 HEMATOCRIT: CPT | Performed by: INTERNAL MEDICINE

## 2020-01-03 PROCEDURE — 99204 OFFICE O/P NEW MOD 45 MIN: CPT | Performed by: INTERNAL MEDICINE

## 2020-01-03 PROCEDURE — 83036 HEMOGLOBIN GLYCOSYLATED A1C: CPT | Performed by: INTERNAL MEDICINE

## 2020-01-03 PROCEDURE — 96361 HYDRATE IV INFUSION ADD-ON: CPT

## 2020-01-03 PROCEDURE — 83735 ASSAY OF MAGNESIUM: CPT | Performed by: INTERNAL MEDICINE

## 2020-01-03 PROCEDURE — 85025 COMPLETE CBC W/AUTO DIFF WBC: CPT | Performed by: INTERNAL MEDICINE

## 2020-01-03 PROCEDURE — 85610 PROTHROMBIN TIME: CPT | Performed by: INTERNAL MEDICINE

## 2020-01-03 PROCEDURE — 84100 ASSAY OF PHOSPHORUS: CPT | Performed by: INTERNAL MEDICINE

## 2020-01-03 PROCEDURE — 84466 ASSAY OF TRANSFERRIN: CPT | Performed by: INTERNAL MEDICINE

## 2020-01-03 PROCEDURE — 83540 ASSAY OF IRON: CPT | Performed by: INTERNAL MEDICINE

## 2020-01-03 RX ADMIN — VANCOMYCIN 125 MG: KIT at 12:55

## 2020-01-03 RX ADMIN — PANTOPRAZOLE SODIUM 40 MG: 40 INJECTION, POWDER, FOR SOLUTION INTRAVENOUS at 16:44

## 2020-01-03 RX ADMIN — DAKIN'S SOLUTION 0.125% (QUARTER STRENGTH): 0.12 SOLUTION at 21:22

## 2020-01-03 RX ADMIN — TERAZOSIN HYDROCHLORIDE ANHYDROUS 2 MG: 2 CAPSULE ORAL at 21:23

## 2020-01-03 RX ADMIN — COLLAGENASE SANTYL: 250 OINTMENT TOPICAL at 01:39

## 2020-01-03 RX ADMIN — ATORVASTATIN CALCIUM 80 MG: 80 TABLET, FILM COATED ORAL at 01:39

## 2020-01-03 RX ADMIN — COLLAGENASE SANTYL: 250 OINTMENT TOPICAL at 21:22

## 2020-01-03 RX ADMIN — DAKIN'S SOLUTION 0.125% (QUARTER STRENGTH): 0.12 SOLUTION at 01:39

## 2020-01-03 RX ADMIN — TERAZOSIN HYDROCHLORIDE ANHYDROUS 2 MG: 2 CAPSULE ORAL at 01:39

## 2020-01-03 RX ADMIN — PANTOPRAZOLE SODIUM 40 MG: 40 INJECTION, POWDER, FOR SOLUTION INTRAVENOUS at 06:18

## 2020-01-03 RX ADMIN — COLLAGENASE SANTYL: 250 OINTMENT TOPICAL at 10:44

## 2020-01-03 RX ADMIN — VANCOMYCIN 125 MG: KIT at 06:17

## 2020-01-03 RX ADMIN — SODIUM CHLORIDE 100 ML/HR: 9 INJECTION, SOLUTION INTRAVENOUS at 21:36

## 2020-01-03 RX ADMIN — ATORVASTATIN CALCIUM 80 MG: 80 TABLET, FILM COATED ORAL at 21:22

## 2020-01-03 RX ADMIN — SODIUM CHLORIDE 100 ML/HR: 9 INJECTION, SOLUTION INTRAVENOUS at 10:44

## 2020-01-03 RX ADMIN — DAKIN'S SOLUTION 0.125% (QUARTER STRENGTH): 0.12 SOLUTION at 10:45

## 2020-01-03 RX ADMIN — VANCOMYCIN 125 MG: KIT at 17:18

## 2020-01-03 NOTE — PLAN OF CARE
Patient alert to voice. Patient being treated for c-diff. Patient npo. G-tube in place and clamped. Patient has multiple wounds(present on admission). Continue to monitor.

## 2020-01-03 NOTE — ED NOTES
Nursing report ED to floor  Cam Gonsalves  74 y.o.  male    HPI (triage note):   Chief Complaint   Patient presents with   • Rectal Bleeding       Admitting doctor:   Efraín Fitch MD    Admitting diagnosis:   The primary encounter diagnosis was Gastrointestinal hemorrhage, unspecified gastrointestinal hemorrhage type. 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    Code status:   Current Code Status     Date Active Code Status Order ID Comments User Context       1/2/2020 2051 CPR [id number]  Efraín Fitch MD ED       Questions for Current Code Status     Question Answer Comment    Code Status CPR     Medical Interventions (Level of Support Prior to Arrest) Full     Comments please confirm with patient/medical decision maker           Allergies:   Patient has no known allergies.    Weight:       01/02/20  1554   Weight: 46.8 kg (103 lb 3.2 oz)       Most recent vitals:   Vitals:    01/02/20 1715 01/02/20 1835 01/02/20 1915 01/02/20 2011   BP: 141/96 (!) 138/101 130/86    Pulse: 117 108 106 112   Resp:       Temp:       TempSrc:       SpO2: 91%      Weight:       Height:           Active LDAs/IV Access:   Lines, Drains & Airways    Active LDAs     None                Labs (abnormal labs have a star):   Labs Reviewed   PROTIME-INR - Abnormal; Notable for the following components:       Result Value    Protime 14.8 (*)     INR 1.19 (*)     All other components within normal limits   COMPREHENSIVE METABOLIC PANEL - Abnormal; Notable for the following components:    Glucose 145 (*)     Creatinine 0.68 (*)     CO2 29.5 (*)     Albumin 3.20 (*)     All other components within normal limits    Narrative:     GFR Normal >60  Chronic Kidney Disease <60  Kidney Failure <15     CBC  WITH AUTO DIFFERENTIAL - Abnormal; Notable for the following components:    RBC 4.13 (*)     Hemoglobin 11.3 (*)     Hematocrit 33.5 (*)     Lymphocyte % 12.3 (*)     Neutrophils, Absolute 7.50 (*)     Monocytes, Absolute 1.09 (*)     All other components within normal limits   MAGNESIUM - Normal   HEMOGLOBIN AND HEMATOCRIT, BLOOD   LACTIC ACID, PLASMA   POCT GLUCOSE FINGERSTICK   POCT GLUCOSE FINGERSTICK   POCT GLUCOSE FINGERSTICK   POCT GLUCOSE FINGERSTICK   TYPE AND SCREEN   CBC AND DIFFERENTIAL    Narrative:     The following orders were created for panel order CBC & Differential.  Procedure                               Abnormality         Status                     ---------                               -----------         ------                     CBC Auto Differential[[id number]]        Abnormal            Final result                 Please view results for these tests on the individual orders.       EKG:   ECG 12 Lead    (Results Pending)       Meds given in ED:   Medications   sodium chloride 0.9 % flush 10 mL (has no administration in time range)   sodium chloride 0.9 % flush 10 mL (has no administration in time range)   sodium chloride 0.9 % flush 10 mL (has no administration in time range)   sodium chloride 0.9 % infusion (has no administration in time range)   acetaminophen (TYLENOL) tablet 650 mg (has no administration in time range)     Or   acetaminophen (TYLENOL) 160 MG/5ML solution 650 mg (has no administration in time range)     Or   acetaminophen (TYLENOL) suppository 650 mg (has no administration in time range)   ondansetron (ZOFRAN) tablet 4 mg (has no administration in time range)     Or   ondansetron (ZOFRAN) injection 4 mg (has no administration in time range)   pantoprazole (PROTONIX) injection 40 mg (has no administration in time range)   Pharmacy Consult (has no administration in time range)   vancomycin oral solution reconstituted 125 mg (has no administration in time range)    dextrose (GLUTOSE) oral gel 15 g (has no administration in time range)   dextrose (D50W) 25 g/ 50mL Intravenous Solution 25 g (has no administration in time range)   glucagon (human recombinant) (GLUCAGEN DIAGNOSTIC) injection 1 mg (has no administration in time range)   insulin regular (humuLIN R,novoLIN R) injection 0-7 Units (has no administration in time range)       Imaging results:  No radiology results for the last day    Ambulatory status:   - bedrest    Social issues:   Social History     Socioeconomic History   • Marital status:      Spouse name: Not on file   • Number of children: Not on file   • Years of education: Not on file   • Highest education level: Not on file        Ifrah Henao, RN  01/02/20 4334

## 2020-01-03 NOTE — NURSING NOTE
Pt arrived to floor, no family at bedside. IVF restarted, order for tube feeds and dietary informed. Will start once tube feeds get to floor. VSS, skin assessed and charted. Will monitor.
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
## 2020-01-03 NOTE — NURSING NOTE
CWOCN consult. Patient with many scattered wounds across body as documented in flowsheet and photos. Most are scabbed or covered with intact eschar. There is an open, partial thickness wound on patient's right shoulder. Unknown etiology. No family present. The wound is moist, pink, hypergranulating, 4x4cm. Depth is flush with skin surface. Additionally, there is an open wound to the left medial 1st metatarsal head. This is moist, with slough.   Dakins and santyl are in room and on MAR. Recommend to use santyl to the foot wound and cover with dry dressing or silicone border to manage the slough. Recommend to use dakins for the shoulder to manage any topical bacteria. Lightly moistened gauze dressing, cover with dry gauze.   The other areas can be left dry and intact.

## 2020-01-03 NOTE — PROGRESS NOTES
Malnutrition Severity Assessment    Patient Name:  Cam Gonsalves  YOB: 1945  MRN: [id number]  Admit Date:  1/2/2020    Patient meets criteria for : Severe Malnutrition    Comments:  MSA completed.  Meets criteria for Severe Chronic Disease Related Malnutrition      Malnutrition Severity Assessment  Malnutrition Type: Chronic Disease - Related Malnutrition     Malnutrition Type (last 8 hours)      Malnutrition Severity Assessment     Row Name 01/03/20 1421       Malnutrition Severity Assessment    Malnutrition Type  Chronic Disease - Related Malnutrition    Row Name 01/03/20 1421       Unintentional Weight Loss     Unintentional Weight Loss   Weight loss greater than 5% in one month    Row Name 01/03/20 1421       Muscle Loss    Loss of Muscle Mass Findings  Severe    [place] Region  Moderate - slight depression    Clavicle Bone Region  Severe - protruding prominent bone    Acromion Bone Region  Severe - squared shoulders, bones, and acromion process protrusion prominent    Dorsal Hand Region  Moderate - slight depression    Patellar Region  Severe - prominent bone, square looking, very little muscle definition    Anterior Thigh Region  Severe - line/depression along thigh, obviously thin    Posterior Calf Region  Severe - thin with very little definition/firmness    Row Name 01/03/20 1421       Fat Loss    Subcutaneous Fat Loss Findings  Severe    Orbital Region   Moderate -  somewhat hollowness, slightly dark circles    Upper Arm Region  Severe - mostly skin, very little space between folds, fingers touch    Row Name 01/03/20 1421       Criteria Met (Must meet criteria for severity in at least 2 of these categories: M Wasting, Fat Loss, Fluid, Secondary Signs, Wt. Status, Intake)    Patient meets criteria for   Severe Malnutrition          Electronically signed by:  Zoey Klein RD  01/03/20 2:50 PM

## 2020-01-03 NOTE — PROGRESS NOTES
Discharge Planning Assessment  [facility]     Patient Name: Cam Gonsalves  MRN: [id number]  Today's Date: 1/3/2020    Admit Date: 1/2/2020    Discharge Needs Assessment     Row Name 01/03/20 1450       Living Environment    Current Living Arrangements  extended care facility    Duration at Residence  [place]    Family Caregiver if Needed  none       Transition Planning    Patient/Family Anticipates Transition to  long term care facility    Transportation Anticipated  health plan transportation       Discharge Needs Assessment    Readmission Within the Last 30 Days  no previous admission in last 30 days    Equipment Currently Used at Home  hospital bed    Discharge Facility/Level of Care Needs  nursing facility, intermediate    Current Discharge Risk  chronically ill        Discharge Plan     Row Name 01/03/20 1452       Plan    Plan  Return to [facility] via ambulance    Provided post acute provider list?  -- declines    Patient/Family in Agreement with Plan  yes aRdha Argenis [phone]    Plan Comments  Facesheet information is verified.  Patient is confused.  Spoke with emergency contact Radha More [phone].  She states she is his guardian.  She states he lives at [place] and she prefers he returns at [place].  She states she prefers ambulance transport.  Spoke with Balaji with [facility].  She states he may return and is long term care.  Informed Radha patient is transfering to [facility].  ............................Clementine Mdcaniel RN        Destination      Coordination has not been started for this encounter.      Durable Medical Equipment      Coordination has not been started for this encounter.      Dialysis/Infusion      Coordination has not been started for this encounter.      Home Medical Care      Coordination has not been started for this encounter.      Therapy      Coordination has not been started for this encounter.      Community Resources      Coordination has not been  started for this encounter.          Demographic Summary     Row Name 01/03/20 1447       General Information    Admission Type  observation    Arrived From  emergency department    Required Notices Provided  Observation Status Notice    Referral Source  admission list    Reason for Consult  discharge planning       Contact Information    Permission Granted to Share Info With  ;family/designee    Contact Information Comments  Patient confused, Radha More, niece [phone]        Functional Status     Row Name 01/03/20 1448       Functional Status    Current Activity Tolerance  poor       Functional Status, IADL    Medications  completely dependent    Meal Preparation  completely dependent    Housekeeping  completely dependent    Laundry  completely dependent    Shopping  completely dependent       Mental Status Summary    Mental Status Comments  confused       Employment/    Employment Status  retired        Psychosocial    No documentation.       Abuse/Neglect    No documentation.       Legal    No documentation.       Substance Abuse    No documentation.       Patient Forms    No documentation.           Clementine Mcdaniel RN

## 2020-01-03 NOTE — H&P
Name: Cam Gonsalves ADMIT: 2020   : 1945  PCP: Enrique Carver MD    MRN: [id number] LOS: 0 days   AGE/SEX: 74 y.o. male  ROOM:      Chief Complaint   Patient presents with   • Rectal Bleeding       Subjective   HPI  Mr. Gonsalves is a 74 y.o. male with a history of sequelae of previous stroke including right hemiplegia a aphasia dysphasia status post PEG, diabetes, chronic anticoagulation who presents to [facility] with GI bleeding.  He was undergoing treatment for C. difficile colitis at his facility when rectal bleeding was noted.  No history is obtainable from the patient due to his a aphasia.  He is moving his left side spontaneously but not moving his right.  He does appear malnourished and there is evidence of bright red blood in his diaper.    Past Medical History:   Diagnosis Date   • Anxiety    • Aphasia    • Cerebral infarction (CMS/HCC)    • Chronic viral hepatitis C (CMS/HCC)    • Diabetes mellitus (CMS/HCC)    • Dysphagia, oropharyngeal phase    • Enterocolitis    • Hemiplegia and hemiparesis following cerebral infarction affecting right dominant side (CMS/HCC)    • Hyperlipidemia    • Hypertension    • Kidney failure, acute (CMS/HCC)    • Myocardial infarction (CMS/HCC)    • Rhabdomyolysis      History reviewed. No pertinent surgical history.  History reviewed. No pertinent family history.  Social History     Tobacco Use   • Smoking status: Not on file   Substance Use Topics   • Alcohol use: Not on file   • Drug use: Not on file       (Not in a hospital admission)  Allergies:  No Known Allergies    Review of Systems   Unable to perform ROS: Patient nonverbal        Objective    Vital Signs  Temp:  [98.2 °F (36.8 °C)] 98.2 °F (36.8 °C)  Heart Rate:  [] 112  Resp:  [18] 18  BP: (130-152)/() 130/86  SpO2:  [91 %-98 %] 91 %  on   ;   Device (Oxygen Therapy): room air  Body mass index is 16.16 kg/m².    Physical Exam   Constitutional: He appears  well-developed.   Frail, ill-appearing   HENT:   Head: Atraumatic.   Nose: Nose normal.   Eyes: Pupils are equal, round, and reactive to light. Conjunctivae are normal.   Neck: Neck supple. No tracheal deviation present.   Cardiovascular: Regular rhythm and intact distal pulses. Tachycardia present.   Pulmonary/Chest: Effort normal. He has decreased breath sounds. He has no wheezes.   Abdominal: Soft. He exhibits no distension. There is no tenderness. There is no rebound and no guarding.   Musculoskeletal: He exhibits no edema or tenderness.   Right hemiplegia   Neurological: He is alert. He is disoriented. He exhibits abnormal muscle tone.   Skin: He is not diaphoretic.   Psychiatric: Cognition and memory are impaired.   Nursing note and vitals reviewed.      Results Review:  I reviewed the patient's new clinical results.  I reviewed EKG/telemetry, tachycardia, appears to be regular on telemetry.  I reviewed prior records.    Lab Results (last 24 hours)     Procedure Component Value Units Date/Time    CBC & Differential [[id number]] Collected:  01/02/20 1604    Specimen:  Blood Updated:  01/02/20 1628    Narrative:       The following orders were created for panel order CBC & Differential.  Procedure                               Abnormality         Status                     ---------                               -----------         ------                     CBC Auto Differential[[id number]]        Abnormal            Final result                 Please view results for these tests on the individual orders.    Protime-INR [[id number]]  (Abnormal) Collected:  01/02/20 1604    Specimen:  Blood Updated:  01/02/20 1632     Protime 14.8 Seconds      INR 1.19    Comprehensive Metabolic Panel [[id number]]  (Abnormal) Collected:  01/02/20 1604    Specimen:  Blood Updated:  01/02/20 1655     Glucose 145 mg/dL      BUN 17 mg/dL      Creatinine 0.68 mg/dL      Sodium 141 mmol/L      Potassium 4.0 mmol/L      Chloride 101  mmol/L      CO2 29.5 mmol/L      Calcium 9.3 mg/dL      Total Protein 7.0 g/dL      Albumin 3.20 g/dL      ALT (SGPT) 22 U/L      AST (SGOT) 19 U/L      Alkaline Phosphatase 72 U/L      Total Bilirubin 0.2 mg/dL      eGFR   Amer 138 mL/min/1.73      Globulin 3.8 gm/dL      A/G Ratio 0.8 g/dL      BUN/Creatinine Ratio 25.0     Anion Gap 10.5 mmol/L     Narrative:       GFR Normal >60  Chronic Kidney Disease <60  Kidney Failure <15      Magnesium [[id number]]  (Normal) Collected:  01/02/20 1604    Specimen:  Blood Updated:  01/02/20 1648     Magnesium 2.1 mg/dL     CBC Auto Differential [[id number]]  (Abnormal) Collected:  01/02/20 1604    Specimen:  Blood Updated:  01/02/20 1628     WBC 10.05 10*3/mm3      RBC 4.13 10*6/mm3      Hemoglobin 11.3 g/dL      Hematocrit 33.5 %      MCV 81.1 fL      MCH 27.4 pg      MCHC 33.7 g/dL      RDW 13.2 %      RDW-SD 38.7 fl      MPV 11.7 fL      Platelets 321 10*3/mm3      Neutrophil % 74.7 %      Lymphocyte % 12.3 %      Monocyte % 10.8 %      Eosinophil % 1.4 %      Basophil % 0.3 %      Immature Grans % 0.5 %      Neutrophils, Absolute 7.50 10*3/mm3      Lymphocytes, Absolute 1.24 10*3/mm3      Monocytes, Absolute 1.09 10*3/mm3      Eosinophils, Absolute 0.14 10*3/mm3      Basophils, Absolute 0.03 10*3/mm3      Immature Grans, Absolute 0.05 10*3/mm3      nRBC 0.0 /100 WBC           No orders to display     Assessment/Plan      Active Hospital Problems    Diagnosis  POA   • **Gastrointestinal hemorrhage [K92.2]  Yes   • Clostridium difficile enterocolitis [A04.72]  Yes   • Sequelae, 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       · GI bleed: On Eliquis for presumed indication of stroke.  We will hold this.  Serial H&H.  Check iron levels.  Protonix IV.   Give fluids and will check abdominal series and lactic acid level.  Consult GI.  · C. difficile: Undergoing treatment.  Continue oral vancomycin.  · Stroke sequelae: PEG status.  N.p.o. and no tube feeds for now.  Will consult nutrition.  He appears quite malnourished.  Will check phosphorus level with morning labs.  · DM2: SSI  · Asking for records from his facility.  Presumed full code for now.  SCDs for prophylaxis.    I discussed the patients findings and my recommendations with patient and consulting provider.      Efraín Fitch MD  [facility]  01/02/20  8:55 PM    Dictated portions using Dragon dictation software.

## 2020-01-03 NOTE — CONSULTS
"Adult Nutrition  Assessment/PES    Patient Name:  Cam Gonsalves  YOB: 1945  MRN: [id number]  Admit Date:  1/2/2020    Assessment Date:  1/3/2020    Comments:  Consult for TF assessment.  Unable to obtain home TF from [facility].  CBW: 97 lbs, down from his last admission weight of 120lbs on 12/16.  BMI 15.  MSA completed.  Meets criteria for Sever Chronic Disease - Related Malnutrition.    Multiple scattered wounds across body, noted.      Recommend starting Isosource HN @ 10mL/hr, increase by 10mL every 12 hrs as tolerated to goal of 60mL/hr.  15mL/hr Free H20.      Will provide 1728 kcals (39kcals/kg) and 78gm protein (1.8gm/kg/d).     Monitor labs for refeeding syndrome d/t patients current nutritional status.      RD to continue to follow.     Reason for Assessment     Row Name 01/03/20 1405          Reason for Assessment    Reason For Assessment  TF/PN     Diagnosis  cardiac disease;diabetes diagnosis/complications GI bleed, Cdiff, Stroke, DM, Gtube, dysphagia     Identified At Risk by Screening Criteria  tube feeding or parenteral nutrition         Nutrition/Diet History     Row Name 01/03/20 1407          Nutrition/Diet History    Typical Food/Fluid Intake  H/O stroke. Nonverbal. +Gtube and tube feeds prior to admission     Factors Affecting Nutritional Intake  difficulty/impaired swallowing         Anthropometrics     Row Name 01/03/20 1412 01/03/20 1408       Anthropometrics    Height  170 cm (66.93\")  170 cm (66.93\")       Admit Weight    Admit Weight  --  44.3 kg (97 lb 10.6 oz)       Ideal Body Weight (IBW)    Ideal Body Weight (IBW) (kg)  67.9  67.9       Body Mass Index (BMI)    BMI Assessment  --  BMI less than 16: protein-energy malnutrition grade III        Labs/Tests/Procedures/Meds     Row Name 01/03/20 1409          Labs/Procedures/Meds    Lab Results Reviewed  reviewed     Lab Results Comments  Gluc: 129, Cr: 0.69, A1C: 6.96        Diagnostic Tests/Procedures    " "Diagnostic Test/Procedure Reviewed  reviewed        Medications    Pertinent Medications Reviewed  reviewed     Pertinent Medications Comments  Insulin, Protonix, IVF          Physical Findings     Row Name 01/03/20 1410          Physical Findings    Overall Physical Appearance  generalized wasting     Oral/Mouth Cavity  poor dentition     Skin  non-healing wound(s) Nathanael: 8, R shoulder PU, R cheek PI, R hip PI, L toe PI, L foot PI, L  knee PI         Estimated/Assessed Needs     Row Name 01/03/20 1412         Calculation Measurements    Weight Used For Calculations  44.3 kg (97 lb 10.6 oz)  --    Height  170 cm (66.93\")         Estimated/Assessed Needs    Additional Documentation  Fluid Requirements (Group);Protein Requirements (Group);KCAL/KG (Group)  --       KCAL/KG    KCAL/KG  35 Kcal/Kg (kcal);40 Kcal/Kg (kcal)  --    35 Kcal/Kg (kcal)  1550.5  --    40 Kcal/Kg (kcal)  1772  --       Protein Requirements    Weight Used For Protein Calculations  44.3 kg (97 lb 10.6 oz)  --    Est Protein Requirement Amount (gms/kg)  1.8 gm protein  --    Estimated Protein Requirements (gms/day)  79.74  --       Fluid Requirements    Estimated Fluid Requirements (mL/day)  1550  --    Estimated Fluid Requirement Method  RDA Method  --    RDA Method (mL)  1550  --               Nutrition Prescription Ordered     Row Name 01/03/20 1415          Nutrition Prescription PO    Current PO Diet  NPO        Nutrition Prescription EN    Enteral Route  Gastrostomy         Evaluation of Received Nutrient/Fluid Intake     Row Name 01/03/20 1415          Fluid Intake Evaluation    IV Fluid (mL)  2400           Malnutrition Severity Assessment     Row Name 01/03/20 1421          Malnutrition Severity Assessment    Malnutrition Type  Chronic Disease - Related Malnutrition        Unintentional Weight Loss     Unintentional Weight Loss   Weight loss greater than 5% in one month        Muscle Loss    Loss of Muscle Mass Findings  Severe     [place] " Region  Moderate - slight depression     Clavicle Bone Region  Severe - protruding prominent bone     Acromion Bone Region  Severe - squared shoulders, bones, and acromion process protrusion prominent     Dorsal Hand Region  Moderate - slight depression     Patellar Region  Severe - prominent bone, square looking, very little muscle definition     Anterior Thigh Region  Severe - line/depression along thigh, obviously thin     Posterior Calf Region  Severe - thin with very little definition/firmness        Fat Loss    Subcutaneous Fat Loss Findings  Severe     Orbital Region   Moderate -  somewhat hollowness, slightly dark circles     Upper Arm Region  Severe - mostly skin, very little space between folds, fingers touch        Criteria Met (Must meet criteria for severity in at least 2 of these categories: M Wasting, Fat Loss, Fluid, Secondary Signs, Wt. Status, Intake)    Patient meets criteria for   Severe Malnutrition           Problem/Interventions:  Problem 1     Row Name 01/03/20 1429          Nutrition Diagnoses Problem 1    Problem 1  Inadequate Nutrient Intake     Etiology (related to)  Functional Diagnosis     Functional Diagnosis  Dysphagia     Signs/Symptoms (evidenced by)  NPO Gtube               Intervention Goal     Row Name 01/03/20 1429          Intervention Goal    General  Nutrition support treatment     TF/PN  Inititiate TF/PN;Tolerate TF at goal     Weight  Appropriate weight gain         Nutrition Intervention     Row Name 01/03/20 1430          Nutrition Intervention    RD/Tech Action  Recommend/ordered;Follow Tx progress;Care plan reviewd     Recommended/Ordered  EN         Nutrition Prescription     Row Name 01/03/20 1430          Nutrition Prescription EN    Enteral Prescription  Enteral begin/change     Enteral Route  Gastrostomy     Product  Isosource HN     Modulars  Jay     Jay  1 packet     Jay frequency  2 times a day     TF Delivery Method  Continuous     Continuous TF Goal Rate  (mL/hr)  60 mL/hr     Continuous TF Starting Rate (mL/hr)  10 mL/hr     Continuous TF Goal Volume (mL)  1440 mL     Water flush (mL)   15 mL     Water Flush Frequency  Per hour     New EN Prescription Ordered?  Yes        Other Orders    Labs  K+;Phos;Mg++;Na+     Labs Ordered?  No, recommended         Education/Evaluation     Row Name 01/03/20 1436          Education    Education  Education not appropriate at this time     Please explain  Patient nonverbal        Monitor/Evaluation    Monitor  Per protocol;I&O;Supplement intake;Weight;Skin status;TF delivery/tolerance     Education Follow-up  Reinforce PRN           Electronically signed by:  Zoey Klein RD  01/03/20 2:36 PM

## 2020-01-03 NOTE — CONSULTS
[facility]  Initial Inpatient Consult Note    Referring Provider: Dr Fitch    Reason for Consultation: GI bleed    Subjective     History of present illness:      Thank you for requesting my opinion.    Patient has aphasia, limited comprehension so history is obtained from the chart and his nurse.  Gastroenterology is consulted for GI bleed.  This is a 74-year-old man who has been living in a nursing home following a stroke.  Apparently he is at his baseline with his mental status and aphasia as well as chronic right-sided weakness.  He was brought to the emergency room for findings of blood in his diaper. He is currently undergoing treatment for C. difficile.  He is dependent on the G-tube for nutrition.  His hemoglobin is found to be stable.  No significant bleeding overnight per his nurse.  Review of ER docs physical exam indicates bloody liquid stool that is light brown and loose on his exam.    Current exam of his diaper indicates some yellowish stool in the diaper with a slight pink tinge.    His nurse denies that he has had any vomiting or more significant stools.    He is on Eliquis for history of stroke.    Past Medical History:  Past Medical History:   Diagnosis Date   • Anxiety    • Aphasia    • Cerebral infarction (CMS/HCC)    • Chronic viral hepatitis C (CMS/HCC)    • Diabetes mellitus (CMS/HCC)    • Dysphagia, oropharyngeal phase    • Enterocolitis    • Hemiplegia and hemiparesis following cerebral infarction affecting right dominant side (CMS/HCC)    • Hyperlipidemia    • Hypertension    • Kidney failure, acute (CMS/HCC)    • Myocardial infarction (CMS/HCC)    • Rhabdomyolysis    • Stroke (CMS/HCC)        Past Surgical History:  History reviewed. No pertinent surgical history.     Social History:   Social History     Tobacco Use   • Smoking status: Former Smoker   • Smokeless tobacco: Never Used   Substance Use Topics   • Alcohol use: Not Currently        Family  History:  History reviewed. No pertinent family history.    Home Meds:  Medications Prior to Admission   Medication Sig Dispense Refill Last Dose   • acetaminophen (TYLENOL) 160 MG/5ML solution 650 mg by Per G Tube route Every 6 (Six) Hours As Needed for Mild Pain  or Fever.      • apixaban (ELIQUIS) 5 MG tablet tablet 5 mg by Per G Tube route Every 12 (Twelve) Hours.      • aspirin 81 MG chewable tablet 81 mg by Per G Tube route Daily.      • atorvastatin (LIPITOR) 80 MG tablet 80 mg by Per G Tube route Every Night.      • collagenase 250 UNIT/GM ointment Apply  topically to the appropriate area as directed 2 (Two) Times a Day. R hip R shoulder      • guaiFENesin (ROBITUSSIN) 100 MG/5ML solution oral solution 200 mg by Per G Tube route Every 4 (Four) Hours As Needed.      • insulin aspart (novoLOG FLEXPEN) 100 UNIT/ML solution pen-injector sc pen Inject  under the skin into the appropriate area as directed 3 (Three) Times a Day With Meals. Per sliding scale from NH      • ipratropium-albuterol (DUO-NEB) 0.5-2.5 mg/3 ml nebulizer Take 3 mL by nebulization Every 4 (Four) Hours As Needed for Shortness of Air.      • omeprazole 2 mg/mL in sodium bicarbonate injection 8.4% 10 mg by Per G Tube route Daily.      • povidone-iodine (BETADINE) 10 % external solution Apply  topically to the appropriate area as directed Daily. Right cheek      • sodium hypochlorite (DAKIN'S) 0.125 % topical solution Apply  topically to the appropriate area as directed Every 12 (Twelve) Hours. L great toe and 2nd toe      • terazosin (HYTRIN) 2 MG capsule 2 mg by Per G Tube route Every Night.      • vancomycin (VANCOCIN) 125 MG capsule 125 mg by Per G Tube route Every 6 (Six) Hours.          Current Meds:     atorvastatin 80 mg Per G Tube Nightly   collagenase  Topical BID   insulin regular 0-7 Units Subcutaneous Q6H   pantoprazole 40 mg Intravenous BID AC   povidone-iodine  Topical Daily   sodium chloride 10 mL Intravenous Q12H   sodium  hypochlorite  Topical Q12H   terazosin 2 mg Per G Tube Nightly   vancomycin 125 mg Oral Q6H       Allergies:  No Known Allergies    Review of Systems  Review of systems could not be obtained due to   patient confusion. patient nonverbal.     Objective     Vital Signs  Temp:  [97.1 °F (36.2 °C)-98.2 °F (36.8 °C)] 97.1 °F (36.2 °C)  Heart Rate:  [] 97  Resp:  [18] 18  BP: ()/() 90/62    Physical Exam:  Constitutional:   Awake, cachectic, lesion on right cheek, nonverbal, no meaningful interaction but does open eyes to voice   Eyes:            Lids and lashes normal, conjunctivae and sclerae normal, no   icterus   Ears, nose, mouth and throat:   Normal appearance of external ears and nose, no oral lesions, no thrush, oral mucosa tacky   Respiratory:     Clear to auscultation, respirations regular, even and                   unlabored    Cardiovascular:    Regular rhythm and normal rate, normal S1 and S2, no            murmur, no gallop, palpable distal pulses, no lower extremity edema   Gastrointestinal:    Soft, thin, no reaction to palpation non-distended, no guarding, no rebound tenderness, normal bowel sounds, no palpable masses or organomegaly, G-tube in place  Rectal exam: deferred   Musculoskeletal:  Contracted, limited right-sided mobility, diffuse atrophy of all extremities   Skin:   Normal color, no bleeding, he does have evidence of skin breakdown on his knees   Lymphatics:   No palpable cervical or supraclavicular adenopathy   Psychiatric:  He opens eyes to voice but does not engage otherwise       Results Review:   I reviewed the patient's new clinical results.    Results from last 7 days   Lab Units 01/03/20  0317 01/02/20  2356 01/02/20  1604   WBC 10*3/mm3 7.71  --  10.05   HEMOGLOBIN g/dL 10.4*  10.4* 11.4* 11.3*   HEMATOCRIT % 32.9*  32.9* 34.2* 33.5*   PLATELETS 10*3/mm3 321  --  321       Results from last 7 days   Lab Units 01/03/20  0317 01/02/20  1604   SODIUM mmol/L 142 141    POTASSIUM mmol/L 4.0 4.0   CHLORIDE mmol/L 104 101   CO2 mmol/L 26.1 29.5*   BUN mg/dL 15 17   CREATININE mg/dL 0.69* 0.68*   CALCIUM mg/dL 8.7 9.3   BILIRUBIN mg/dL 0.3 0.2   ALK PHOS U/L 64 72   ALT (SGPT) U/L 18 22   AST (SGOT) U/L 17 19   GLUCOSE mg/dL 129* 145*       Results from last 7 days   Lab Units 01/03/20  0317 01/02/20  1604   INR  1.17* 1.19*       No results found for: LIPASE    Radiology:  Imaging Results (Last 72 Hours)     Procedure Component Value Units Date/Time    XR Abdomen 2+ VW with Chest 1 VW [[id number]] Collected:  01/03/20 0740     Updated:  01/03/20 0740    Narrative:       XR ABDOMEN 2+ VIEWS WITH CHEST 1 VIEW-  01/02/2020     HISTORY:GI bleed.     FINDINGS: The bowel gas pattern is unremarkable with no evidence of free  air or bowel dilatation. There are degenerative changes in the spine.  Percutaneous gastrostomy tube is seen with its tip in the left upper  quadrant. Small calcifications are seen in the midline of the lower  pelvis which may be in the prostate gland.     Heart and lungs appear unremarkable except for a small amount of aortic  calcification.       Impression:       No acute process identified.             Assessment/Plan       Gastrointestinal hemorrhage    Clostridium difficile enterocolitis    Sequelae, post-stroke    Type 2 diabetes mellitus with other specified complication (CMS/HCC)    Long term current use of anticoagulant therapy      Impression  1.  Rectal bleeding: Only scant pink-tinged stool in his diaper at this time.  Suspect his bleeding is due to a inflamed colon from his C. difficile infection    2.  C. difficile infection: He has been on vancomycin    3.  Anticoagulated: On Eliquis for history of stroke    4.  History of stroke    5.  Cachexia    Plan  No evidence of a significant GI bleed at this time.  Additionally, with recent C. difficile there is a distinct reason for him to have some rectal bleeding from an inflamed colon    Monitor  stools    Monitor hemoglobin but decrease frequency of checks    Resume home tube feeds    Agree with holding Eliquis.  I do question if this is a medication that he really needs given his overall debility    Continue vancomycin for history of C. difficile        I discussed the patients findings and my recommendations with nursing staff    Trang Elizondo MD  [facility]      Dictated utilizing Dragon dictation

## 2020-01-03 NOTE — PROGRESS NOTES
PHARMACY CONSULT: [facility] TREATMENT  Per Dr. Fitch     Change proton pump inhibitors (PPIs) to famotidine unless documented or history of GI bleed or to discontinue antiperistalic agents and stool softeners/laxatives.     1) patient is currently on protonix 40mg iv bid  2) History of GI bleed on this admission so will leave the patient on the protonix  3) Is not on any antiperistalic agents or stool softeners/laxatives  4) will follow patient daily     Isaias Kelly [facility].

## 2020-01-04 LAB
ALBUMIN SERPL-MCNC: 2.4 G/DL (ref 3.5–5.2)
ANION GAP SERPL CALCULATED.3IONS-SCNC: 10 MMOL/L (ref 5–15)
BUN BLD-MCNC: 10 MG/DL (ref 8–23)
BUN/CREAT SERPL: 16.1 (ref 7–25)
CALCIUM SPEC-SCNC: 8.1 MG/DL (ref 8.6–10.5)
CHLORIDE SERPL-SCNC: 108 MMOL/L (ref 98–107)
CO2 SERPL-SCNC: 23 MMOL/L (ref 22–29)
CREAT BLD-MCNC: 0.62 MG/DL (ref 0.76–1.27)
DEPRECATED RDW RBC AUTO: 39.8 FL (ref 37–54)
ERYTHROCYTE [DISTWIDTH] IN BLOOD BY AUTOMATED COUNT: 13.2 % (ref 12.3–15.4)
GFR SERPL CREATININE-BSD FRML MDRD: >150 ML/MIN/1.73
GLUCOSE BLD-MCNC: 113 MG/DL (ref 65–99)
GLUCOSE BLDC GLUCOMTR-MCNC: 105 MG/DL (ref 70–130)
GLUCOSE BLDC GLUCOMTR-MCNC: 111 MG/DL (ref 70–130)
GLUCOSE BLDC GLUCOMTR-MCNC: 115 MG/DL (ref 70–130)
GLUCOSE BLDC GLUCOMTR-MCNC: 139 MG/DL (ref 70–130)
HCT VFR BLD AUTO: 29.5 % (ref 37.5–51)
HCT VFR BLD AUTO: 29.5 % (ref 37.5–51)
HCT VFR BLD AUTO: 31.1 % (ref 37.5–51)
HCT VFR BLD AUTO: 31.7 % (ref 37.5–51)
HGB BLD-MCNC: 9.7 G/DL (ref 13–17.7)
HGB BLD-MCNC: 9.7 G/DL (ref 13–17.7)
HGB BLD-MCNC: 9.8 G/DL (ref 13–17.7)
HGB BLD-MCNC: 9.9 G/DL (ref 13–17.7)
MAGNESIUM SERPL-MCNC: 1.9 MG/DL (ref 1.6–2.4)
MCH RBC QN AUTO: 26.9 PG (ref 26.6–33)
MCHC RBC AUTO-ENTMCNC: 32.9 G/DL (ref 31.5–35.7)
MCV RBC AUTO: 81.7 FL (ref 79–97)
PHOSPHATE SERPL-MCNC: 2.7 MG/DL (ref 2.5–4.5)
PLATELET # BLD AUTO: 285 10*3/MM3 (ref 140–450)
PMV BLD AUTO: 10.9 FL (ref 6–12)
POTASSIUM BLD-SCNC: 3.8 MMOL/L (ref 3.5–5.2)
RBC # BLD AUTO: 3.61 10*6/MM3 (ref 4.14–5.8)
SODIUM BLD-SCNC: 141 MMOL/L (ref 136–145)
WBC NRBC COR # BLD: 3.41 10*3/MM3 (ref 3.4–10.8)

## 2020-01-04 PROCEDURE — 83735 ASSAY OF MAGNESIUM: CPT | Performed by: HOSPITALIST

## 2020-01-04 PROCEDURE — G0378 HOSPITAL OBSERVATION PER HR: HCPCS

## 2020-01-04 PROCEDURE — 85018 HEMOGLOBIN: CPT | Performed by: INTERNAL MEDICINE

## 2020-01-04 PROCEDURE — 36415 COLL VENOUS BLD VENIPUNCTURE: CPT | Performed by: HOSPITALIST

## 2020-01-04 PROCEDURE — 96361 HYDRATE IV INFUSION ADD-ON: CPT

## 2020-01-04 PROCEDURE — 85027 COMPLETE CBC AUTOMATED: CPT | Performed by: HOSPITALIST

## 2020-01-04 PROCEDURE — 85014 HEMATOCRIT: CPT | Performed by: INTERNAL MEDICINE

## 2020-01-04 PROCEDURE — 82962 GLUCOSE BLOOD TEST: CPT

## 2020-01-04 PROCEDURE — 99213 OFFICE O/P EST LOW 20 MIN: CPT | Performed by: INTERNAL MEDICINE

## 2020-01-04 PROCEDURE — 96376 TX/PRO/DX INJ SAME DRUG ADON: CPT

## 2020-01-04 PROCEDURE — 80069 RENAL FUNCTION PANEL: CPT | Performed by: HOSPITALIST

## 2020-01-04 RX ADMIN — PANTOPRAZOLE SODIUM 40 MG: 40 INJECTION, POWDER, FOR SOLUTION INTRAVENOUS at 06:50

## 2020-01-04 RX ADMIN — SODIUM CHLORIDE, PRESERVATIVE FREE 10 ML: 5 INJECTION INTRAVENOUS at 09:27

## 2020-01-04 RX ADMIN — ATORVASTATIN CALCIUM 80 MG: 80 TABLET, FILM COATED ORAL at 21:26

## 2020-01-04 RX ADMIN — VANCOMYCIN 125 MG: KIT at 06:15

## 2020-01-04 RX ADMIN — SODIUM CHLORIDE 100 ML/HR: 9 INJECTION, SOLUTION INTRAVENOUS at 15:39

## 2020-01-04 RX ADMIN — PANTOPRAZOLE SODIUM 40 MG: 40 INJECTION, POWDER, FOR SOLUTION INTRAVENOUS at 18:01

## 2020-01-04 RX ADMIN — ACETAMINOPHEN 650 MG: 325 TABLET, FILM COATED ORAL at 21:26

## 2020-01-04 RX ADMIN — SODIUM CHLORIDE 100 ML/HR: 9 INJECTION, SOLUTION INTRAVENOUS at 06:24

## 2020-01-04 RX ADMIN — VANCOMYCIN 125 MG: KIT at 00:57

## 2020-01-04 RX ADMIN — TERAZOSIN HYDROCHLORIDE ANHYDROUS 2 MG: 2 CAPSULE ORAL at 21:26

## 2020-01-04 RX ADMIN — VANCOMYCIN 125 MG: KIT at 18:05

## 2020-01-04 RX ADMIN — DAKIN'S SOLUTION 0.125% (QUARTER STRENGTH): 0.12 SOLUTION at 09:27

## 2020-01-04 RX ADMIN — COLLAGENASE SANTYL: 250 OINTMENT TOPICAL at 09:27

## 2020-01-04 RX ADMIN — VANCOMYCIN 125 MG: KIT at 11:21

## 2020-01-04 NOTE — PLAN OF CARE
Problem: Patient Care Overview  Goal: Plan of Care Review  Outcome: Ongoing (interventions implemented as appropriate)  Flowsheets (Taken 1/3/2020 2200)  Plan of Care Reviewed With: patient  Note:   Pt has been up most of the night-he is restless but does not appear uncomfortable. Still having small amounts of loose stool-no blood observed-and is in a brief due to bowel and bladder incontinence. Tube feeds infusing -will increase to 20ml/hr at 0600. No residual noted. Dressing changes completed per MD order-pt tolerated well. Serial H&H-q 8 hours-pt hemoglobin dropped 1 point from 2 days ago but he is receiving NS@100cc/hr. Continue to do oral care frequently. Will continue to monitor and follow current POC.

## 2020-01-04 NOTE — PROGRESS NOTES
[facility] [place] [facility]     Inpatient Follow Up Note    Patient Identification:  Name: Cam Gonsalves  Age: 74 y.o.  Sex: male  :  1945  MRN: [id number]    Information from:patient     CC: CDI, rectal bleeding    History:   Patient is uncommunicative.  He appears restless but not in any acute distress.    Review of Systems:  Not obtainable.            Problem List:  Patient Active Problem List    Diagnosis   • *Gastrointestinal hemorrhage [K92.2]   • Severe malnutrition (CMS/HCC) [E43]   • Clostridium difficile enterocolitis [A04.72]   • Sequelae, post-stroke [I69.30]   • Type 2 diabetes mellitus with other specified complication (CMS/HCC) [E11.69]   • Long term current use of anticoagulant therapy [Z79.01]     Current Meds:  MAR Reviewed  Scheduled Meds:  atorvastatin 80 mg Per G Tube Nightly   collagenase  Topical BID   insulin regular 0-7 Units Subcutaneous Q6H   pantoprazole 40 mg Intravenous BID AC   povidone-iodine  Topical Daily   sodium chloride 10 mL Intravenous Q12H   sodium hypochlorite  Topical Q12H   terazosin 2 mg Per G Tube Nightly   vancomycin 125 mg Oral Q6H     Continuous Infusions:  Pharmacy Consult     sodium chloride 100 mL/hr Last Rate: 100 mL/hr (20 0926)     PRN Meds:.•  acetaminophen **OR** acetaminophen **OR** acetaminophen  •  dextrose  •  dextrose  •  glucagon (human recombinant)  •  ipratropium-albuterol  •  ondansetron **OR** ondansetron  •  Pharmacy Consult  •  [COMPLETED] Insert peripheral IV **AND** sodium chloride  •  sodium chloride  Allergies:  No Known Allergies    Intake/Output:     Intake/Output Summary (Last 24 hours) at 2020 1057  Last data filed at 2020 0926  Gross per 24 hour   Intake 1821 ml   Output --   Net 1821 ml     New allergies/reactions:  None    Physical Exam:  Vitals:   Temp (24hrs), Av.9 °F (36.6 °C), Min:97.7 °F (36.5 °C), Max:98 °F (36.7 °C)    Temp:  [97.7 °F (36.5 °C)-98 °F (36.7 °C)] 97.7 °F (36.5 °C)  Heart Rate:  " [91-99] 99  Resp:  [16-18] 16  BP: (108-123)/(56-74) 108/56  /56 (BP Location: Left arm)   Pulse 99   Temp 97.7 °F (36.5 °C) (Axillary)   Resp 16   Ht 170 cm (66.93\")   Wt 44.3 kg (97 lb 11.2 oz)   SpO2 98%   BMI 15.33 kg/m²     Exam:  Chronically ill-appearing gentleman.  Not communicative in any meaningful way.  No respiratory distress.  Abdomen has increased abdominal tone but palpation does not seem to elicit any tenderness.        DATA:  Radiology and Labs:   Recent Results (from the past 24 hour(s))   POC Glucose Once    Collection Time: 01/03/20 11:45 AM   Result Value Ref Range    Glucose 113 70 - 130 mg/dL   POC Glucose Once    Collection Time: 01/03/20  3:39 PM   Result Value Ref Range    Glucose 98 70 - 130 mg/dL   Hemoglobin & Hematocrit, Blood    Collection Time: 01/03/20  4:03 PM   Result Value Ref Range    Hemoglobin 10.9 (L) 13.0 - 17.7 g/dL    Hematocrit 34.5 (L) 37.5 - 51.0 %   POC Glucose Once    Collection Time: 01/03/20  8:32 PM   Result Value Ref Range    Glucose 115 70 - 130 mg/dL   Hemoglobin & Hematocrit, Blood    Collection Time: 01/04/20 12:07 AM   Result Value Ref Range    Hemoglobin 9.9 (L) 13.0 - 17.7 g/dL    Hematocrit 31.7 (L) 37.5 - 51.0 %   POC Glucose Once    Collection Time: 01/04/20 12:39 AM   Result Value Ref Range    Glucose 115 70 - 130 mg/dL   POC Glucose Once    Collection Time: 01/04/20  6:27 AM   Result Value Ref Range    Glucose 105 70 - 130 mg/dL   Magnesium    Collection Time: 01/04/20  8:10 AM   Result Value Ref Range    Magnesium 1.9 1.6 - 2.4 mg/dL   CBC (No Diff)    Collection Time: 01/04/20  8:10 AM   Result Value Ref Range    WBC 3.41 3.40 - 10.80 10*3/mm3    RBC 3.61 (L) 4.14 - 5.80 10*6/mm3    Hemoglobin 9.7 (L) 13.0 - 17.7 g/dL    Hematocrit 29.5 (L) 37.5 - 51.0 %    MCV 81.7 79.0 - 97.0 fL    MCH 26.9 26.6 - 33.0 pg    MCHC 32.9 31.5 - 35.7 g/dL    RDW 13.2 12.3 - 15.4 %    RDW-SD 39.8 37.0 - 54.0 fl    MPV 10.9 6.0 - 12.0 fL    Platelets 285 140 " - 450 10*3/mm3   Renal Function Panel    Collection Time: 01/04/20  8:10 AM   Result Value Ref Range    Glucose 113 (H) 65 - 99 mg/dL    BUN 10 8 - 23 mg/dL    Creatinine 0.62 (L) 0.76 - 1.27 mg/dL    Sodium 141 136 - 145 mmol/L    Potassium 3.8 3.5 - 5.2 mmol/L    Chloride 108 (H) 98 - 107 mmol/L    CO2 23.0 22.0 - 29.0 mmol/L    Calcium 8.1 (L) 8.6 - 10.5 mg/dL    Albumin 2.40 (L) 3.50 - 5.20 g/dL    Phosphorus 2.7 2.5 - 4.5 mg/dL    Anion Gap 10.0 5.0 - 15.0 mmol/L    BUN/Creatinine Ratio 16.1 7.0 - 25.0    eGFR  African Amer >150 >60 mL/min/1.73   Hemoglobin & Hematocrit, Blood    Collection Time: 01/04/20  8:10 AM   Result Value Ref Range    Hemoglobin 9.7 (L) 13.0 - 17.7 g/dL    Hematocrit 29.5 (L) 37.5 - 51.0 %       Assessment:   Problem List:     Gastrointestinal hemorrhage    Clostridium difficile enterocolitis    Sequelae, post-stroke    Type 2 diabetes mellitus with other specified complication (CMS/HCC)    Long term current use of anticoagulant therapy    Severe malnutrition (CMS/HCC)    Nursing notes, etc. reviewed.  Seems to be holding his own.    Plan:   Continue current management and careful monitoring.         Raghavendra Burgess MD  [facility]  1/4/2020

## 2020-01-04 NOTE — PLAN OF CARE
Problem: Patient Care Overview  Goal: Plan of Care Review  Outcome: Ongoing (interventions implemented as appropriate)  Flowsheets (Taken 1/4/2020 1852)  Progress: no change  Plan of Care Reviewed With: patient  Outcome Summary: Tube feeding increased to 30ml/hr at 1800, pt tolerating. Pt having frequent loose stools. Accuchecks continued per order. IVF continue. Pt restless most of the day.

## 2020-01-04 NOTE — ACP (ADVANCE CARE PLANNING)
Responded to AD consult    Pt was awake.    Educated pt on AD.    Unsure if pt is appropriate for AD at this time.     If pt is able to communicate their desire to file an AD, please place a new consult at which a notary will be present for the completion of AD.

## 2020-01-04 NOTE — PROGRESS NOTES
"DAILY PROGRESS NOTE  [facility]    Patient Identification:  Name: Cam Gonsalves  Age: 74 y.o.  Sex: male  :  1945  MRN: [id number]         Primary Care Physician: Enrique Carver MD    Subjective:  Interval History:He is weak.    Objective:    Scheduled Meds:  atorvastatin 80 mg Per G Tube Nightly   collagenase  Topical BID   insulin regular 0-7 Units Subcutaneous Q6H   pantoprazole 40 mg Intravenous BID AC   povidone-iodine  Topical Daily   sodium chloride 10 mL Intravenous Q12H   sodium hypochlorite  Topical Q12H   terazosin 2 mg Per G Tube Nightly   vancomycin 125 mg Oral Q6H     Continuous Infusions:  Pharmacy Consult     sodium chloride 100 mL/hr Last Rate: 100 mL/hr (20 09)       Vital signs in last 24 hours:  Temp:  [97.7 °F (36.5 °C)-98 °F (36.7 °C)] 97.7 °F (36.5 °C)  Heart Rate:  [91-99] 99  Resp:  [16-18] 16  BP: (108-123)/(56-74) 108/56    Intake/Output:    Intake/Output Summary (Last 24 hours) at 2020 1136  Last data filed at 2020 1120  Gross per 24 hour   Intake 2261 ml   Output --   Net 2261 ml       Exam:  /56 (BP Location: Left arm)   Pulse 99   Temp 97.7 °F (36.5 °C) (Axillary)   Resp 16   Ht 170 cm (66.93\")   Wt 44.3 kg (97 lb 11.2 oz)   SpO2 98%   BMI 15.33 kg/m²     General Appearance:    Alert, cooperative, no distress   Head:    Normocephalic, without obvious abnormality, atraumatic   Eyes:       Throat:   Lips, tongue, gums normal   Neck:   Supple, symmetrical, trachea midline, no JVD   Lungs:     Clear to auscultation bilaterally, respirations unlabored   Chest Wall:    No tenderness or deformity    Heart:    Regular rate and rhythm, S1 and S2 normal, no murmur,no  Rub or gallop   Abdomen:     Soft, PEG tube, bowel sounds active, no masses, no organomegaly    Extremities:   Extremities normal, atraumatic, no cyanosis or edema   Pulses:      Skin:   Skin is warm and dry,  no rashes or palpable lesions   Neurologic:   Weakness from " stroke.       Lab Results (last 72 hours)     Procedure Component Value Units Date/Time    CBC (No Diff) [[id number]]  (Abnormal) Collected:  01/04/20 0810    Specimen:  Blood Updated:  01/04/20 0906     WBC 3.41 10*3/mm3      RBC 3.61 10*6/mm3      Hemoglobin 9.7 g/dL      Hematocrit 29.5 %      MCV 81.7 fL      MCH 26.9 pg      MCHC 32.9 g/dL      RDW 13.2 %      RDW-SD 39.8 fl      MPV 10.9 fL      Platelets 285 10*3/mm3     Renal Function Panel [[id number]]  (Abnormal) Collected:  01/04/20 0810    Specimen:  Blood Updated:  01/04/20 0905     Glucose 113 mg/dL      BUN 10 mg/dL      Creatinine 0.62 mg/dL      Sodium 141 mmol/L      Potassium 3.8 mmol/L      Chloride 108 mmol/L      CO2 23.0 mmol/L      Calcium 8.1 mg/dL      Albumin 2.40 g/dL      Phosphorus 2.7 mg/dL      Anion Gap 10.0 mmol/L      BUN/Creatinine Ratio 16.1     eGFR  African Amer >150 mL/min/1.73     Narrative:       GFR Normal >60  Chronic Kidney Disease <60  Kidney Failure <15      Magnesium [[id number]]  (Normal) Collected:  01/04/20 0810    Specimen:  Blood Updated:  01/04/20 0904     Magnesium 1.9 mg/dL     Hemoglobin & Hematocrit, Blood [[id number]]  (Abnormal) Collected:  01/04/20 0810    Specimen:  Blood Updated:  01/04/20 0855     Hemoglobin 9.7 g/dL      Hematocrit 29.5 %     POC Glucose Once [[id number]]  (Normal) Collected:  01/04/20 0627    Specimen:  Blood Updated:  01/04/20 0629     Glucose 105 mg/dL     POC Glucose Once [[id number]]  (Normal) Collected:  01/04/20 0039    Specimen:  Blood Updated:  01/04/20 0041     Glucose 115 mg/dL     Hemoglobin & Hematocrit, Blood [[id number]]  (Abnormal) Collected:  01/04/20 0007    Specimen:  Blood Updated:  01/04/20 0034     Hemoglobin 9.9 g/dL      Hematocrit 31.7 %     POC Glucose Once [[id number]]  (Normal) Collected:  01/03/20 2032    Specimen:  Blood Updated:  01/03/20 2034     Glucose 115 mg/dL     Hemoglobin & Hematocrit, Blood [[id number]]  (Abnormal) Collected:  01/03/20 1603    Specimen:   Blood Updated:  01/03/20 1618     Hemoglobin 10.9 g/dL      Hematocrit 34.5 %     POC Glucose Once [[id number]]  (Normal) Collected:  01/03/20 1539    Specimen:  Blood Updated:  01/03/20 1540     Glucose 98 mg/dL     POC Glucose Once [[id number]]  (Normal) Collected:  01/03/20 1145    Specimen:  Blood Updated:  01/03/20 1207     Glucose 113 mg/dL     POC Glucose Once [[id number]]  (Normal) Collected:  01/03/20 0609    Specimen:  Blood Updated:  01/03/20 0611     Glucose 127 mg/dL     Ferritin [[id number]]  (Normal) Collected:  01/03/20 0317    Specimen:  Blood Updated:  01/03/20 0449     Ferritin 115.00 ng/mL     Comprehensive Metabolic Panel [[id number]]  (Abnormal) Collected:  01/03/20 0317    Specimen:  Blood Updated:  01/03/20 0440     Glucose 129 mg/dL      BUN 15 mg/dL      Creatinine 0.69 mg/dL      Sodium 142 mmol/L      Potassium 4.0 mmol/L      Chloride 104 mmol/L      CO2 26.1 mmol/L      Calcium 8.7 mg/dL      Total Protein 6.3 g/dL      Albumin 3.10 g/dL      ALT (SGPT) 18 U/L      AST (SGOT) 17 U/L      Alkaline Phosphatase 64 U/L      Total Bilirubin 0.3 mg/dL      eGFR  African Amer 135 mL/min/1.73      Globulin 3.2 gm/dL      A/G Ratio 1.0 g/dL      BUN/Creatinine Ratio 21.7     Anion Gap 11.9 mmol/L     Narrative:       GFR Normal >60  Chronic Kidney Disease <60  Kidney Failure <15      Iron Profile [[id number]]  (Abnormal) Collected:  01/03/20 0317    Specimen:  Blood Updated:  01/03/20 0440     Iron 31 mcg/dL      Iron Saturation 13 %      Transferrin 159 mg/dL      TIBC 237 mcg/dL     Phosphorus [[id number]]  (Normal) Collected:  01/03/20 0317    Specimen:  Blood Updated:  01/03/20 0440     Phosphorus 4.0 mg/dL     Magnesium [[id number]]  (Normal) Collected:  01/03/20 0317    Specimen:  Blood Updated:  01/03/20 0440     Magnesium 2.0 mg/dL     Protime-INR [[id number]]  (Abnormal) Collected:  01/03/20 0317    Specimen:  Blood from Arm, Right Updated:  01/03/20 0433     Protime 14.6 Seconds      INR 1.17     Hemoglobin A1c [[id number]]  (Abnormal) Collected:  01/03/20 0317    Specimen:  Blood Updated:  01/03/20 0422     Hemoglobin A1C 6.96 %     Narrative:       Hemoglobin A1C Ranges:    Increased Risk for Diabetes  5.7% to 6.4%  Diabetes                     >= 6.5%  Diabetic Goal                < 7.0%    CBC Auto Differential [[id number]]  (Abnormal) Collected:  01/03/20 0317    Specimen:  Blood Updated:  01/03/20 0415     WBC 7.71 10*3/mm3      RBC 3.96 10*6/mm3      Hemoglobin 10.4 g/dL      Hematocrit 32.9 %      MCV 83.1 fL      MCH 26.3 pg      MCHC 31.6 g/dL      RDW 13.1 %      RDW-SD 40.1 fl      MPV 12.1 fL      Platelets 321 10*3/mm3      Neutrophil % 67.1 %      Lymphocyte % 19.2 %      Monocyte % 11.5 %      Eosinophil % 1.3 %      Basophil % 0.5 %      Immature Grans % 0.4 %      Neutrophils, Absolute 5.17 10*3/mm3      Lymphocytes, Absolute 1.48 10*3/mm3      Monocytes, Absolute 0.89 10*3/mm3      Eosinophils, Absolute 0.10 10*3/mm3      Basophils, Absolute 0.04 10*3/mm3      Immature Grans, Absolute 0.03 10*3/mm3      nRBC 0.1 /100 WBC     Hemoglobin & Hematocrit, Blood [[id number]]  (Abnormal) Collected:  01/03/20 0317    Specimen:  Blood Updated:  01/03/20 0413     Hemoglobin 10.4 g/dL      Hematocrit 32.9 %     Hemoglobin & Hematocrit, Blood [[id number]]  (Abnormal) Collected:  01/02/20 2356    Specimen:  Blood Updated:  01/03/20 0033     Hemoglobin 11.4 g/dL      Hematocrit 34.2 %     POC Glucose Once [[id number]]  (Normal) Collected:  01/03/20 0027    Specimen:  Blood Updated:  01/03/20 0028     Glucose 115 mg/dL     Lactic Acid, Plasma [[id number]]  (Normal) Collected:  01/02/20 2143    Specimen:  Blood Updated:  01/02/20 2222     Lactate 1.4 mmol/L     Comprehensive Metabolic Panel [[id number]]  (Abnormal) Collected:  01/02/20 1604    Specimen:  Blood Updated:  01/02/20 1655     Glucose 145 mg/dL      BUN 17 mg/dL      Creatinine 0.68 mg/dL      Sodium 141 mmol/L      Potassium 4.0 mmol/L       Chloride 101 mmol/L      CO2 29.5 mmol/L      Calcium 9.3 mg/dL      Total Protein 7.0 g/dL      Albumin 3.20 g/dL      ALT (SGPT) 22 U/L      AST (SGOT) 19 U/L      Alkaline Phosphatase 72 U/L      Total Bilirubin 0.2 mg/dL      eGFR   Amer 138 mL/min/1.73      Globulin 3.8 gm/dL      A/G Ratio 0.8 g/dL      BUN/Creatinine Ratio 25.0     Anion Gap 10.5 mmol/L     Narrative:       GFR Normal >60  Chronic Kidney Disease <60  Kidney Failure <15      Magnesium [[id number]]  (Normal) Collected:  01/02/20 1604    Specimen:  Blood Updated:  01/02/20 1648     Magnesium 2.1 mg/dL     Protime-INR [[id number]]  (Abnormal) Collected:  01/02/20 1604    Specimen:  Blood Updated:  01/02/20 1632     Protime 14.8 Seconds      INR 1.19    CBC & Differential [[id number]] Collected:  01/02/20 1604    Specimen:  Blood Updated:  01/02/20 1628    Narrative:       The following orders were created for panel order CBC & Differential.  Procedure                               Abnormality         Status                     ---------                               -----------         ------                     CBC Auto Differential[[id number]]        Abnormal            Final result                 Please view results for these tests on the individual orders.    CBC Auto Differential [[id number]]  (Abnormal) Collected:  01/02/20 1604    Specimen:  Blood Updated:  01/02/20 1628     WBC 10.05 10*3/mm3      RBC 4.13 10*6/mm3      Hemoglobin 11.3 g/dL      Hematocrit 33.5 %      MCV 81.1 fL      MCH 27.4 pg      MCHC 33.7 g/dL      RDW 13.2 %      RDW-SD 38.7 fl      MPV 11.7 fL      Platelets 321 10*3/mm3      Neutrophil % 74.7 %      Lymphocyte % 12.3 %      Monocyte % 10.8 %      Eosinophil % 1.4 %      Basophil % 0.3 %      Immature Grans % 0.5 %      Neutrophils, Absolute 7.50 10*3/mm3      Lymphocytes, Absolute 1.24 10*3/mm3      Monocytes, Absolute 1.09 10*3/mm3      Eosinophils, Absolute 0.14 10*3/mm3      Basophils, Absolute 0.03  10*3/mm3      Immature Grans, Absolute 0.05 10*3/mm3      nRBC 0.0 /100 WBC         Data Review:  Results from last 7 days   Lab Units 01/04/20  0810 01/03/20 0317 01/02/20  1604   SODIUM mmol/L 141 142 141   POTASSIUM mmol/L 3.8 4.0 4.0   CHLORIDE mmol/L 108* 104 101   CO2 mmol/L 23.0 26.1 29.5*   BUN mg/dL 10 15 17   CREATININE mg/dL 0.62* 0.69* 0.68*   GLUCOSE mg/dL 113* 129* 145*   CALCIUM mg/dL 8.1* 8.7 9.3     Results from last 7 days   Lab Units 01/04/20  0810 01/04/20  0007 01/03/20  1603 01/03/20 0317 01/02/20  1604   WBC 10*3/mm3 3.41  --   --  7.71  --  10.05   HEMOGLOBIN g/dL 9.7*  9.7* 9.9* 10.9* 10.4*  10.4*   < > 11.3*   HEMATOCRIT % 29.5*  29.5* 31.7* 34.5* 32.9*  32.9*   < > 33.5*   PLATELETS 10*3/mm3 285  --   --  321  --  321    < > = values in this interval not displayed.         Results from last 7 days   Lab Units 01/03/20 0317   HEMOGLOBIN A1C % 6.96*     No results found for: TROPONINT      Results from last 7 days   Lab Units 01/03/20 0317 01/02/20  1604   ALK PHOS U/L 64 72   BILIRUBIN mg/dL 0.3 0.2   ALT (SGPT) U/L 18 22   AST (SGOT) U/L 17 19         Results from last 7 days   Lab Units 01/03/20 0317   HEMOGLOBIN A1C % 6.96*     Glucose   Date/Time Value Ref Range Status   01/04/2020 0627 105 70 - 130 mg/dL Final   01/04/2020 0039 115 70 - 130 mg/dL Final   01/03/2020 2032 115 70 - 130 mg/dL Final   01/03/2020 1539 98 70 - 130 mg/dL Final   01/03/2020 1145 113 70 - 130 mg/dL Final   01/03/2020 0609 127 70 - 130 mg/dL Final   01/03/2020 0027 115 70 - 130 mg/dL Final     Results from last 7 days   Lab Units 01/03/20  0317 01/02/20  1604   INR  1.17* 1.19*       Past Medical History:   Diagnosis Date   • Anxiety    • Aphasia    • Cerebral infarction (CMS/HCC)    • Chronic viral hepatitis C (CMS/HCC)    • Diabetes mellitus (CMS/HCC)    • Dysphagia, oropharyngeal phase    • Enterocolitis    • Hemiplegia and hemiparesis following cerebral infarction affecting right dominant side  (CMS/HCC)    • Hyperlipidemia    • Hypertension    • Kidney failure, acute (CMS/HCC)    • Myocardial infarction (CMS/HCC)    • Rhabdomyolysis    • Stroke (CMS/HCC)        Assessment:  Active Hospital Problems    Diagnosis  POA   • **Gastrointestinal hemorrhage [K92.2]  Yes   • Severe malnutrition (CMS/HCC) [E43]  Unknown   • Clostridium difficile enterocolitis [A04.72]  Yes   • Sequelae, 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       Plan:  Continue with current RX. Follow lab. GI consult noted.    Mariusz Johnston MD  1/4/2020  11:36 AM

## 2020-01-05 LAB
ANION GAP SERPL CALCULATED.3IONS-SCNC: 9.2 MMOL/L (ref 5–15)
ANISOCYTOSIS BLD QL: ABNORMAL
BUN BLD-MCNC: 12 MG/DL (ref 8–23)
BUN/CREAT SERPL: 26.7 (ref 7–25)
BURR CELLS BLD QL SMEAR: ABNORMAL
CALCIUM SPEC-SCNC: 7.7 MG/DL (ref 8.6–10.5)
CHLORIDE SERPL-SCNC: 107 MMOL/L (ref 98–107)
CO2 SERPL-SCNC: 21.8 MMOL/L (ref 22–29)
CREAT BLD-MCNC: 0.45 MG/DL (ref 0.76–1.27)
DEPRECATED RDW RBC AUTO: 39.1 FL (ref 37–54)
ERYTHROCYTE [DISTWIDTH] IN BLOOD BY AUTOMATED COUNT: 13.3 % (ref 12.3–15.4)
GFR SERPL CREATININE-BSD FRML MDRD: >150 ML/MIN/1.73
GLUCOSE BLD-MCNC: 128 MG/DL (ref 65–99)
GLUCOSE BLDC GLUCOMTR-MCNC: 113 MG/DL (ref 70–130)
GLUCOSE BLDC GLUCOMTR-MCNC: 113 MG/DL (ref 70–130)
GLUCOSE BLDC GLUCOMTR-MCNC: 129 MG/DL (ref 70–130)
GLUCOSE BLDC GLUCOMTR-MCNC: 136 MG/DL (ref 70–130)
GLUCOSE BLDC GLUCOMTR-MCNC: 138 MG/DL (ref 70–130)
HCT VFR BLD AUTO: 27.8 % (ref 37.5–51)
HCT VFR BLD AUTO: 27.9 % (ref 37.5–51)
HCT VFR BLD AUTO: 28.5 % (ref 37.5–51)
HGB BLD-MCNC: 9.1 G/DL (ref 13–17.7)
HGB BLD-MCNC: 9.1 G/DL (ref 13–17.7)
HGB BLD-MCNC: 9.3 G/DL (ref 13–17.7)
LYMPHOCYTES # BLD MANUAL: 0.65 10*3/MM3 (ref 0.7–3.1)
LYMPHOCYTES NFR BLD MANUAL: 21.3 % (ref 19.6–45.3)
LYMPHOCYTES NFR BLD MANUAL: 24.5 % (ref 5–12)
MCH RBC QN AUTO: 26.5 PG (ref 26.6–33)
MCHC RBC AUTO-ENTMCNC: 32.6 G/DL (ref 31.5–35.7)
MCV RBC AUTO: 81.1 FL (ref 79–97)
MONOCYTES # BLD AUTO: 0.75 10*3/MM3 (ref 0.1–0.9)
NEUTROPHILS # BLD AUTO: 1.66 10*3/MM3 (ref 1.7–7)
NEUTROPHILS NFR BLD MANUAL: 54.3 % (ref 42.7–76)
PLAT MORPH BLD: NORMAL
PLATELET # BLD AUTO: 276 10*3/MM3 (ref 140–450)
PMV BLD AUTO: 11.1 FL (ref 6–12)
POIKILOCYTOSIS BLD QL SMEAR: ABNORMAL
POTASSIUM BLD-SCNC: 3.5 MMOL/L (ref 3.5–5.2)
RBC # BLD AUTO: 3.44 10*6/MM3 (ref 4.14–5.8)
SODIUM BLD-SCNC: 138 MMOL/L (ref 136–145)
WBC MORPH BLD: NORMAL
WBC NRBC COR # BLD: 3.05 10*3/MM3 (ref 3.4–10.8)

## 2020-01-05 PROCEDURE — 96361 HYDRATE IV INFUSION ADD-ON: CPT

## 2020-01-05 PROCEDURE — 36415 COLL VENOUS BLD VENIPUNCTURE: CPT | Performed by: HOSPITALIST

## 2020-01-05 PROCEDURE — 85018 HEMOGLOBIN: CPT | Performed by: INTERNAL MEDICINE

## 2020-01-05 PROCEDURE — 96376 TX/PRO/DX INJ SAME DRUG ADON: CPT

## 2020-01-05 PROCEDURE — 85014 HEMATOCRIT: CPT | Performed by: INTERNAL MEDICINE

## 2020-01-05 PROCEDURE — 99213 OFFICE O/P EST LOW 20 MIN: CPT | Performed by: INTERNAL MEDICINE

## 2020-01-05 PROCEDURE — 80048 BASIC METABOLIC PNL TOTAL CA: CPT | Performed by: HOSPITALIST

## 2020-01-05 PROCEDURE — 85025 COMPLETE CBC W/AUTO DIFF WBC: CPT | Performed by: HOSPITALIST

## 2020-01-05 PROCEDURE — G0378 HOSPITAL OBSERVATION PER HR: HCPCS

## 2020-01-05 PROCEDURE — 82962 GLUCOSE BLOOD TEST: CPT

## 2020-01-05 PROCEDURE — 85007 BL SMEAR W/DIFF WBC COUNT: CPT | Performed by: HOSPITALIST

## 2020-01-05 RX ORDER — POTASSIUM CHLORIDE 1.5 G/1.77G
40 POWDER, FOR SOLUTION ORAL ONCE
Status: COMPLETED | OUTPATIENT
Start: 2020-01-05 | End: 2020-01-05

## 2020-01-05 RX ADMIN — COLLAGENASE SANTYL: 250 OINTMENT TOPICAL at 06:37

## 2020-01-05 RX ADMIN — COLLAGENASE SANTYL: 250 OINTMENT TOPICAL at 09:43

## 2020-01-05 RX ADMIN — SODIUM CHLORIDE, PRESERVATIVE FREE 10 ML: 5 INJECTION INTRAVENOUS at 09:43

## 2020-01-05 RX ADMIN — VANCOMYCIN 125 MG: KIT at 17:49

## 2020-01-05 RX ADMIN — PANTOPRAZOLE SODIUM 40 MG: 40 INJECTION, POWDER, FOR SOLUTION INTRAVENOUS at 06:43

## 2020-01-05 RX ADMIN — VANCOMYCIN 125 MG: KIT at 06:43

## 2020-01-05 RX ADMIN — SODIUM CHLORIDE 100 ML/HR: 9 INJECTION, SOLUTION INTRAVENOUS at 19:35

## 2020-01-05 RX ADMIN — SODIUM CHLORIDE 100 ML/HR: 9 INJECTION, SOLUTION INTRAVENOUS at 09:43

## 2020-01-05 RX ADMIN — TERAZOSIN HYDROCHLORIDE ANHYDROUS 2 MG: 2 CAPSULE ORAL at 21:56

## 2020-01-05 RX ADMIN — POTASSIUM CHLORIDE 40 MEQ: 1.5 POWDER, FOR SOLUTION ORAL at 17:55

## 2020-01-05 RX ADMIN — DAKIN'S SOLUTION 0.125% (QUARTER STRENGTH): 0.12 SOLUTION at 09:43

## 2020-01-05 RX ADMIN — SODIUM CHLORIDE, PRESERVATIVE FREE 10 ML: 5 INJECTION INTRAVENOUS at 21:58

## 2020-01-05 RX ADMIN — ATORVASTATIN CALCIUM 80 MG: 80 TABLET, FILM COATED ORAL at 21:56

## 2020-01-05 RX ADMIN — SODIUM CHLORIDE 100 ML/HR: 9 INJECTION, SOLUTION INTRAVENOUS at 01:35

## 2020-01-05 RX ADMIN — PANTOPRAZOLE SODIUM 40 MG: 40 INJECTION, POWDER, FOR SOLUTION INTRAVENOUS at 17:49

## 2020-01-05 RX ADMIN — VANCOMYCIN 125 MG: KIT at 01:35

## 2020-01-05 RX ADMIN — VANCOMYCIN 125 MG: KIT at 13:41

## 2020-01-05 NOTE — PROGRESS NOTES
[facility]     Inpatient Follow Up Note    Patient Identification:  Name: Cam Gonsalves  Age: 74 y.o.  Sex: male  :  1945  MRN: [id number]    Information from:caregiver     CC: CDI, rectal bleeding.     History:   After a very restless night patient is sleeping well at the moment.  Nurse reports continued incontinence of stool and multiple loose stools throughout the day.    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]   • Type 2 diabetes mellitus with other specified complication (CMS/HCC) [E11.69]   • Long term current use of anticoagulant therapy [Z79.01]     Current Meds:  MAR Reviewed  Scheduled Meds:  atorvastatin 80 mg Per G Tube Nightly   collagenase  Topical BID   insulin regular 0-7 Units Subcutaneous Q6H   pantoprazole 40 mg Intravenous BID AC   povidone-iodine  Topical Daily   sodium chloride 10 mL Intravenous Q12H   sodium hypochlorite  Topical Q12H   terazosin 2 mg Per G Tube Nightly   vancomycin 125 mg Oral Q6H     Continuous Infusions:  Pharmacy Consult     sodium chloride 100 mL/hr Last Rate: 100 mL/hr (20 0744)     PRN Meds:.•  acetaminophen **OR** acetaminophen **OR** acetaminophen  •  dextrose  •  dextrose  •  glucagon (human recombinant)  •  ipratropium-albuterol  •  ondansetron **OR** ondansetron  •  Pharmacy Consult  •  [COMPLETED] Insert peripheral IV **AND** sodium chloride  •  sodium chloride  Allergies:  No Known Allergies    Intake/Output:     Intake/Output Summary (Last 24 hours) at 2020 0857  Last data filed at 2020 0552  Gross per 24 hour   Intake 2584 ml   Output --   Net 2584 ml     New allergies/reactions:  None    Physical Exam:  Vitals:   Temp  "(24hrs), Av.6 °F (36.4 °C), Min:97.5 °F (36.4 °C), Max:97.6 °F (36.4 °C)    Temp:  [97.5 °F (36.4 °C)-97.6 °F (36.4 °C)] 97.5 °F (36.4 °C)  Heart Rate:  [69-94] 69  Resp:  [16-20] 16  BP: (110-111)/(58-61) 111/58  /58 (BP Location: Left arm, Patient Position: Lying)   Pulse 69   Temp 97.5 °F (36.4 °C) (Oral)   Resp 16   Ht 170 cm (66.93\")   Wt 44.3 kg (97 lb 11.2 oz)   SpO2 99%   BMI 15.33 kg/m²     Exam:  Resting quietly, no distress.        DATA:  Radiology and Labs:   Recent Results (from the past 24 hour(s))   POC Glucose Once    Collection Time: 20 12:09 PM   Result Value Ref Range    Glucose 139 (H) 70 - 130 mg/dL   Hemoglobin & Hematocrit, Blood    Collection Time: 20  3:56 PM   Result Value Ref Range    Hemoglobin 9.8 (L) 13.0 - 17.7 g/dL    Hematocrit 31.1 (L) 37.5 - 51.0 %   POC Glucose Once    Collection Time: 20  6:55 PM   Result Value Ref Range    Glucose 111 70 - 130 mg/dL   Hemoglobin & Hematocrit, Blood    Collection Time: 20 12:03 AM   Result Value Ref Range    Hemoglobin 9.1 (L) 13.0 - 17.7 g/dL    Hematocrit 27.8 (L) 37.5 - 51.0 %   POC Glucose Once    Collection Time: 20 12:34 AM   Result Value Ref Range    Glucose 138 (H) 70 - 130 mg/dL   POC Glucose Once    Collection Time: 20  6:30 AM   Result Value Ref Range    Glucose 129 70 - 130 mg/dL   Basic Metabolic Panel    Collection Time: 20  7:19 AM   Result Value Ref Range    Glucose 128 (H) 65 - 99 mg/dL    BUN 12 8 - 23 mg/dL    Creatinine 0.45 (L) 0.76 - 1.27 mg/dL    Sodium 138 136 - 145 mmol/L    Potassium 3.5 3.5 - 5.2 mmol/L    Chloride 107 98 - 107 mmol/L    CO2 21.8 (L) 22.0 - 29.0 mmol/L    Calcium 7.7 (L) 8.6 - 10.5 mg/dL    eGFR  African Amer >150 >60 mL/min/1.73    BUN/Creatinine Ratio 26.7 (H) 7.0 - 25.0    Anion Gap 9.2 5.0 - 15.0 mmol/L   CBC Auto Differential    Collection Time: 20  7:19 AM   Result Value Ref Range    WBC 3.05 (L) 3.40 - 10.80 10*3/mm3    RBC 3.44 (L) " 4.14 - 5.80 10*6/mm3    Hemoglobin 9.1 (L) 13.0 - 17.7 g/dL    Hematocrit 27.9 (L) 37.5 - 51.0 %    MCV 81.1 79.0 - 97.0 fL    MCH 26.5 (L) 26.6 - 33.0 pg    MCHC 32.6 31.5 - 35.7 g/dL    RDW 13.3 12.3 - 15.4 %    RDW-SD 39.1 37.0 - 54.0 fl    MPV 11.1 6.0 - 12.0 fL    Platelets 276 140 - 450 10*3/mm3   Manual Differential    Collection Time: 01/05/20  7:19 AM   Result Value Ref Range    Neutrophil % 54.3 42.7 - 76.0 %    Lymphocyte % 21.3 19.6 - 45.3 %    Monocyte % 24.5 (H) 5.0 - 12.0 %    Neutrophils Absolute 1.66 (L) 1.70 - 7.00 10*3/mm3    Lymphocytes Absolute 0.65 (L) 0.70 - 3.10 10*3/mm3    Monocytes Absolute 0.75 0.10 - 0.90 10*3/mm3    Anisocytosis Slight/1+ None Seen    Kenny Cells Slight/1+ None Seen    Poikilocytes Large/3+ None Seen    WBC Morphology Normal Normal    Platelet Morphology Normal Normal       Assessment:   Problem List:     Gastrointestinal hemorrhage    Clostridium difficile enterocolitis    Sequelae, post-stroke    Type 2 diabetes mellitus with other specified complication (CMS/HCC)    Long term current use of anticoagulant therapy    Severe malnutrition (CMS/HCC)    We discussed possibly using the fecal management tube but I hate to do that with the patient who is disoriented and restless.    Plan:   Continue current management and careful monitoring.         Raghavendra Burgess MD  [facility]  1/5/2020

## 2020-01-05 NOTE — PROGRESS NOTES
"DAILY PROGRESS NOTE  [facility]    Patient Identification:  Name: Cam Gonsalves  Age: 74 y.o.  Sex: male  :  1945  MRN: [id number]         Primary Care Physician: Enrique Carver MD    Subjective:  Interval History:He is weak.  Having more diarrhea.    Objective:    Scheduled Meds:    atorvastatin 80 mg Per G Tube Nightly   collagenase  Topical BID   insulin regular 0-7 Units Subcutaneous Q6H   pantoprazole 40 mg Intravenous BID AC   povidone-iodine  Topical Daily   sodium chloride 10 mL Intravenous Q12H   sodium hypochlorite  Topical Q12H   terazosin 2 mg Per G Tube Nightly   vancomycin 125 mg Oral Q6H     Continuous Infusions:    Pharmacy Consult     sodium chloride 100 mL/hr Last Rate: 100 mL/hr (20 0943)       Vital signs in last 24 hours:  Temp:  [97.5 °F (36.4 °C)-97.6 °F (36.4 °C)] 97.5 °F (36.4 °C)  Heart Rate:  [69-94] 69  Resp:  [16-20] 16  BP: (110-111)/(58-61) 111/58    Intake/Output:    Intake/Output Summary (Last 24 hours) at 2020 1218  Last data filed at 2020 0944  Gross per 24 hour   Intake 3324 ml   Output --   Net 3324 ml       Exam:  /58 (BP Location: Left arm, Patient Position: Lying)   Pulse 69   Temp 97.5 °F (36.4 °C) (Oral)   Resp 16   Ht 170 cm (66.93\")   Wt 44.3 kg (97 lb 11.2 oz)   SpO2 99%   BMI 15.33 kg/m²     General Appearance:    Alert, cooperative, no distress   Head:    Normocephalic, without obvious abnormality, atraumatic   Eyes:       Throat:   Lips, tongue, gums normal   Neck:   Supple, symmetrical, trachea midline, no JVD   Lungs:     Clear to auscultation bilaterally, respirations unlabored   Chest Wall:    No tenderness or deformity    Heart:    Regular rate and rhythm, S1 and S2 normal, no murmur,no  Rub or gallop   Abdomen:     Soft, PEG tube, bowel sounds active, no masses, no organomegaly    Extremities:   Extremities normal, atraumatic, no cyanosis or edema   Pulses:      Skin:   Skin is warm and dry,  no rashes " or palpable lesions   Neurologic:   Weakness from stroke.  Confused      Lab Results (last 72 hours)     Procedure Component Value Units Date/Time    CBC (No Diff) [[id number]]  (Abnormal) Collected:  01/04/20 0810    Specimen:  Blood Updated:  01/04/20 0906     WBC 3.41 10*3/mm3      RBC 3.61 10*6/mm3      Hemoglobin 9.7 g/dL      Hematocrit 29.5 %      MCV 81.7 fL      MCH 26.9 pg      MCHC 32.9 g/dL      RDW 13.2 %      RDW-SD 39.8 fl      MPV 10.9 fL      Platelets 285 10*3/mm3     Renal Function Panel [[id number]]  (Abnormal) Collected:  01/04/20 0810    Specimen:  Blood Updated:  01/04/20 0905     Glucose 113 mg/dL      BUN 10 mg/dL      Creatinine 0.62 mg/dL      Sodium 141 mmol/L      Potassium 3.8 mmol/L      Chloride 108 mmol/L      CO2 23.0 mmol/L      Calcium 8.1 mg/dL      Albumin 2.40 g/dL      Phosphorus 2.7 mg/dL      Anion Gap 10.0 mmol/L      BUN/Creatinine Ratio 16.1     eGFR  African Amer >150 mL/min/1.73     Narrative:       GFR Normal >60  Chronic Kidney Disease <60  Kidney Failure <15      Magnesium [[id number]]  (Normal) Collected:  01/04/20 0810    Specimen:  Blood Updated:  01/04/20 0904     Magnesium 1.9 mg/dL     Hemoglobin & Hematocrit, Blood [[id number]]  (Abnormal) Collected:  01/04/20 0810    Specimen:  Blood Updated:  01/04/20 0855     Hemoglobin 9.7 g/dL      Hematocrit 29.5 %     POC Glucose Once [[id number]]  (Normal) Collected:  01/04/20 0627    Specimen:  Blood Updated:  01/04/20 0629     Glucose 105 mg/dL     POC Glucose Once [[id number]]  (Normal) Collected:  01/04/20 0039    Specimen:  Blood Updated:  01/04/20 0041     Glucose 115 mg/dL     Hemoglobin & Hematocrit, Blood [[id number]]  (Abnormal) Collected:  01/04/20 0007    Specimen:  Blood Updated:  01/04/20 0034     Hemoglobin 9.9 g/dL      Hematocrit 31.7 %     POC Glucose Once [[id number]]  (Normal) Collected:  01/03/20 2032    Specimen:  Blood Updated:  01/03/20 2034     Glucose 115 mg/dL     Hemoglobin & Hematocrit, Blood  [[id number]]  (Abnormal) Collected:  01/03/20 1603    Specimen:  Blood Updated:  01/03/20 1618     Hemoglobin 10.9 g/dL      Hematocrit 34.5 %     POC Glucose Once [[id number]]  (Normal) Collected:  01/03/20 1539    Specimen:  Blood Updated:  01/03/20 1540     Glucose 98 mg/dL     POC Glucose Once [[id number]]  (Normal) Collected:  01/03/20 1145    Specimen:  Blood Updated:  01/03/20 1207     Glucose 113 mg/dL     POC Glucose Once [[id number]]  (Normal) Collected:  01/03/20 0609    Specimen:  Blood Updated:  01/03/20 0611     Glucose 127 mg/dL     Ferritin [[id number]]  (Normal) Collected:  01/03/20 0317    Specimen:  Blood Updated:  01/03/20 0449     Ferritin 115.00 ng/mL     Comprehensive Metabolic Panel [[id number]]  (Abnormal) Collected:  01/03/20 0317    Specimen:  Blood Updated:  01/03/20 0440     Glucose 129 mg/dL      BUN 15 mg/dL      Creatinine 0.69 mg/dL      Sodium 142 mmol/L      Potassium 4.0 mmol/L      Chloride 104 mmol/L      CO2 26.1 mmol/L      Calcium 8.7 mg/dL      Total Protein 6.3 g/dL      Albumin 3.10 g/dL      ALT (SGPT) 18 U/L      AST (SGOT) 17 U/L      Alkaline Phosphatase 64 U/L      Total Bilirubin 0.3 mg/dL      eGFR  African Amer 135 mL/min/1.73      Globulin 3.2 gm/dL      A/G Ratio 1.0 g/dL      BUN/Creatinine Ratio 21.7     Anion Gap 11.9 mmol/L     Narrative:       GFR Normal >60  Chronic Kidney Disease <60  Kidney Failure <15      Iron Profile [[id number]]  (Abnormal) Collected:  01/03/20 0317    Specimen:  Blood Updated:  01/03/20 0440     Iron 31 mcg/dL      Iron Saturation 13 %      Transferrin 159 mg/dL      TIBC 237 mcg/dL     Phosphorus [[id number]]  (Normal) Collected:  01/03/20 0317    Specimen:  Blood Updated:  01/03/20 0440     Phosphorus 4.0 mg/dL     Magnesium [[id number]]  (Normal) Collected:  01/03/20 0317    Specimen:  Blood Updated:  01/03/20 0440     Magnesium 2.0 mg/dL     Protime-INR [[id number]]  (Abnormal) Collected:  01/03/20 0317    Specimen:  Blood from Arm, Right  Updated:  01/03/20 0433     Protime 14.6 Seconds      INR 1.17    Hemoglobin A1c [[id number]]  (Abnormal) Collected:  01/03/20 0317    Specimen:  Blood Updated:  01/03/20 0422     Hemoglobin A1C 6.96 %     Narrative:       Hemoglobin A1C Ranges:    Increased Risk for Diabetes  5.7% to 6.4%  Diabetes                     >= 6.5%  Diabetic Goal                < 7.0%    CBC Auto Differential [[id number]]  (Abnormal) Collected:  01/03/20 0317    Specimen:  Blood Updated:  01/03/20 0415     WBC 7.71 10*3/mm3      RBC 3.96 10*6/mm3      Hemoglobin 10.4 g/dL      Hematocrit 32.9 %      MCV 83.1 fL      MCH 26.3 pg      MCHC 31.6 g/dL      RDW 13.1 %      RDW-SD 40.1 fl      MPV 12.1 fL      Platelets 321 10*3/mm3      Neutrophil % 67.1 %      Lymphocyte % 19.2 %      Monocyte % 11.5 %      Eosinophil % 1.3 %      Basophil % 0.5 %      Immature Grans % 0.4 %      Neutrophils, Absolute 5.17 10*3/mm3      Lymphocytes, Absolute 1.48 10*3/mm3      Monocytes, Absolute 0.89 10*3/mm3      Eosinophils, Absolute 0.10 10*3/mm3      Basophils, Absolute 0.04 10*3/mm3      Immature Grans, Absolute 0.03 10*3/mm3      nRBC 0.1 /100 WBC     Hemoglobin & Hematocrit, Blood [[id number]]  (Abnormal) Collected:  01/03/20 0317    Specimen:  Blood Updated:  01/03/20 0413     Hemoglobin 10.4 g/dL      Hematocrit 32.9 %     Hemoglobin & Hematocrit, Blood [[id number]]  (Abnormal) Collected:  01/02/20 2356    Specimen:  Blood Updated:  01/03/20 0033     Hemoglobin 11.4 g/dL      Hematocrit 34.2 %     POC Glucose Once [[id number]]  (Normal) Collected:  01/03/20 0027    Specimen:  Blood Updated:  01/03/20 0028     Glucose 115 mg/dL     Lactic Acid, Plasma [[id number]]  (Normal) Collected:  01/02/20 2143    Specimen:  Blood Updated:  01/02/20 2222     Lactate 1.4 mmol/L     Comprehensive Metabolic Panel [[id number]]  (Abnormal) Collected:  01/02/20 1604    Specimen:  Blood Updated:  01/02/20 1655     Glucose 145 mg/dL      BUN 17 mg/dL      Creatinine 0.68  mg/dL      Sodium 141 mmol/L      Potassium 4.0 mmol/L      Chloride 101 mmol/L      CO2 29.5 mmol/L      Calcium 9.3 mg/dL      Total Protein 7.0 g/dL      Albumin 3.20 g/dL      ALT (SGPT) 22 U/L      AST (SGOT) 19 U/L      Alkaline Phosphatase 72 U/L      Total Bilirubin 0.2 mg/dL      eGFR   Amer 138 mL/min/1.73      Globulin 3.8 gm/dL      A/G Ratio 0.8 g/dL      BUN/Creatinine Ratio 25.0     Anion Gap 10.5 mmol/L     Narrative:       GFR Normal >60  Chronic Kidney Disease <60  Kidney Failure <15      Magnesium [[id number]]  (Normal) Collected:  01/02/20 1604    Specimen:  Blood Updated:  01/02/20 1648     Magnesium 2.1 mg/dL     Protime-INR [[id number]]  (Abnormal) Collected:  01/02/20 1604    Specimen:  Blood Updated:  01/02/20 1632     Protime 14.8 Seconds      INR 1.19    CBC & Differential [[id number]] Collected:  01/02/20 1604    Specimen:  Blood Updated:  01/02/20 1628    Narrative:       The following orders were created for panel order CBC & Differential.  Procedure                               Abnormality         Status                     ---------                               -----------         ------                     CBC Auto Differential[[id number]]        Abnormal            Final result                 Please view results for these tests on the individual orders.    CBC Auto Differential [[id number]]  (Abnormal) Collected:  01/02/20 1604    Specimen:  Blood Updated:  01/02/20 1628     WBC 10.05 10*3/mm3      RBC 4.13 10*6/mm3      Hemoglobin 11.3 g/dL      Hematocrit 33.5 %      MCV 81.1 fL      MCH 27.4 pg      MCHC 33.7 g/dL      RDW 13.2 %      RDW-SD 38.7 fl      MPV 11.7 fL      Platelets 321 10*3/mm3      Neutrophil % 74.7 %      Lymphocyte % 12.3 %      Monocyte % 10.8 %      Eosinophil % 1.4 %      Basophil % 0.3 %      Immature Grans % 0.5 %      Neutrophils, Absolute 7.50 10*3/mm3      Lymphocytes, Absolute 1.24 10*3/mm3      Monocytes, Absolute 1.09 10*3/mm3      Eosinophils,  Absolute 0.14 10*3/mm3      Basophils, Absolute 0.03 10*3/mm3      Immature Grans, Absolute 0.05 10*3/mm3      nRBC 0.0 /100 WBC         Data Review:  Results from last 7 days   Lab Units 01/05/20  0719 01/04/20  0810 01/03/20 0317   SODIUM mmol/L 138 141 142   POTASSIUM mmol/L 3.5 3.8 4.0   CHLORIDE mmol/L 107 108* 104   CO2 mmol/L 21.8* 23.0 26.1   BUN mg/dL 12 10 15   CREATININE mg/dL 0.45* 0.62* 0.69*   GLUCOSE mg/dL 128* 113* 129*   CALCIUM mg/dL 7.7* 8.1* 8.7     Results from last 7 days   Lab Units 01/05/20  0719 01/05/20  0003 01/04/20  1556 01/04/20  0810  01/03/20 0317   WBC 10*3/mm3 3.05*  --   --  3.41  --  7.71   HEMOGLOBIN g/dL 9.1* 9.1* 9.8* 9.7*  9.7*   < > 10.4*  10.4*   HEMATOCRIT % 27.9* 27.8* 31.1* 29.5*  29.5*   < > 32.9*  32.9*   PLATELETS 10*3/mm3 276  --   --  285  --  321    < > = values in this interval not displayed.         Results from last 7 days   Lab Units 01/03/20 0317   HEMOGLOBIN A1C % 6.96*     No results found for: TROPONINT      Results from last 7 days   Lab Units 01/03/20  0317 01/02/20  1604   ALK PHOS U/L 64 72   BILIRUBIN mg/dL 0.3 0.2   ALT (SGPT) U/L 18 22   AST (SGOT) U/L 17 19         Results from last 7 days   Lab Units 01/03/20 0317   HEMOGLOBIN A1C % 6.96*     Glucose   Date/Time Value Ref Range Status   01/05/2020 1048 136 (H) 70 - 130 mg/dL Final   01/05/2020 0630 129 70 - 130 mg/dL Final   01/05/2020 0034 138 (H) 70 - 130 mg/dL Final   01/04/2020 1855 111 70 - 130 mg/dL Final   01/04/2020 1209 139 (H) 70 - 130 mg/dL Final   01/04/2020 0627 105 70 - 130 mg/dL Final   01/04/2020 0039 115 70 - 130 mg/dL Final   01/03/2020 2032 115 70 - 130 mg/dL Final     Results from last 7 days   Lab Units 01/03/20  0317 01/02/20  1604   INR  1.17* 1.19*       Past Medical History:   Diagnosis Date   • Anxiety    • Aphasia    • Cerebral infarction (CMS/HCC)    • Chronic viral hepatitis C (CMS/HCC)    • Diabetes mellitus (CMS/HCC)    • Dysphagia, oropharyngeal phase    •  Enterocolitis    • Hemiplegia and hemiparesis following cerebral infarction affecting right dominant side (CMS/HCC)    • Hyperlipidemia    • Hypertension    • Kidney failure, acute (CMS/[facility])    • Myocardial infarction (CMS/HCC)    • Rhabdomyolysis    • Stroke (CMS/[facility])        Assessment:  Active Hospital Problems    Diagnosis  POA   • **Gastrointestinal hemorrhage [K92.2]  Yes   • Severe malnutrition (CMS/[facility]) [E43]  Unknown   • Clostridium difficile enterocolitis [A04.72]  Yes   • Sequelae, 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       Plan:  Continue with current RX. Follow lab. GI consult noted.  Continue with tube feeds and try to get to goal rate.  Possibly discharge to skilled nursing facility on Monday.    Mariusz Johnston MD  1/5/2020  12:18 PM

## 2020-01-05 NOTE — PLAN OF CARE
Problem: Patient Care Overview  Goal: Plan of Care Review  Outcome: Ongoing (interventions implemented as appropriate)  Flowsheets (Taken 1/5/2020 0619)  Progress: no change  Plan of Care Reviewed With: patient  Note:   Tube feed now at 40ml/hr-no residuals. Incontinent of bowel and bladder-passing mucoid brown stools-no insulin coverage required. Pt slept very little again-tylenol given per g-tube for pain with little change in restlessness. Will continue to monitor and follow current POC.

## 2020-01-05 NOTE — PLAN OF CARE
Problem: Patient Care Overview  Goal: Plan of Care Review  Outcome: Ongoing (interventions implemented as appropriate)  Flowsheets (Taken 1/5/2020 6282)  Progress: no change  Plan of Care Reviewed With: patient  Outcome Summary: Pt having frequent loose stools, MDs aware. Pt otherwise tolerating tube feeds at 40ml/hr; will increase to 50ml/hr at 1800. IVF continue. Pt restless at times but was able to nap throughout the day. Will continue to monitor.

## 2020-01-06 VITALS
HEIGHT: 67 IN | OXYGEN SATURATION: 99 % | HEART RATE: 79 BPM | DIASTOLIC BLOOD PRESSURE: 75 MMHG | RESPIRATION RATE: 16 BRPM | BODY MASS INDEX: 15.34 KG/M2 | TEMPERATURE: 97.5 F | SYSTOLIC BLOOD PRESSURE: 133 MMHG | WEIGHT: 97.7 LBS

## 2020-01-06 LAB
ANION GAP SERPL CALCULATED.3IONS-SCNC: 9.5 MMOL/L (ref 5–15)
BASOPHILS # BLD AUTO: 0.01 10*3/MM3 (ref 0–0.2)
BASOPHILS NFR BLD AUTO: 0.2 % (ref 0–1.5)
BUN BLD-MCNC: 11 MG/DL (ref 8–23)
BUN/CREAT SERPL: 24.4 (ref 7–25)
CALCIUM SPEC-SCNC: 8.1 MG/DL (ref 8.6–10.5)
CHLORIDE SERPL-SCNC: 108 MMOL/L (ref 98–107)
CO2 SERPL-SCNC: 22.5 MMOL/L (ref 22–29)
CREAT BLD-MCNC: 0.45 MG/DL (ref 0.76–1.27)
DEPRECATED RDW RBC AUTO: 40.5 FL (ref 37–54)
EOSINOPHIL # BLD AUTO: 0.15 10*3/MM3 (ref 0–0.4)
EOSINOPHIL NFR BLD AUTO: 3.2 % (ref 0.3–6.2)
ERYTHROCYTE [DISTWIDTH] IN BLOOD BY AUTOMATED COUNT: 13.5 % (ref 12.3–15.4)
GFR SERPL CREATININE-BSD FRML MDRD: >150 ML/MIN/1.73
GLUCOSE BLD-MCNC: 139 MG/DL (ref 65–99)
GLUCOSE BLDC GLUCOMTR-MCNC: 113 MG/DL (ref 70–130)
GLUCOSE BLDC GLUCOMTR-MCNC: 119 MG/DL (ref 70–130)
GLUCOSE BLDC GLUCOMTR-MCNC: 156 MG/DL (ref 70–130)
HCT VFR BLD AUTO: 30.6 % (ref 37.5–51)
HCT VFR BLD AUTO: 31.2 % (ref 37.5–51)
HCT VFR BLD AUTO: 31.2 % (ref 37.5–51)
HGB BLD-MCNC: 10.1 G/DL (ref 13–17.7)
HGB BLD-MCNC: 10.1 G/DL (ref 13–17.7)
HGB BLD-MCNC: 9.6 G/DL (ref 13–17.7)
IMM GRANULOCYTES # BLD AUTO: 0.01 10*3/MM3 (ref 0–0.05)
IMM GRANULOCYTES NFR BLD AUTO: 0.2 % (ref 0–0.5)
LYMPHOCYTES # BLD AUTO: 1.71 10*3/MM3 (ref 0.7–3.1)
LYMPHOCYTES NFR BLD AUTO: 35.9 % (ref 19.6–45.3)
MCH RBC QN AUTO: 26.6 PG (ref 26.6–33)
MCHC RBC AUTO-ENTMCNC: 32.4 G/DL (ref 31.5–35.7)
MCV RBC AUTO: 82.3 FL (ref 79–97)
MONOCYTES # BLD AUTO: 0.64 10*3/MM3 (ref 0.1–0.9)
MONOCYTES NFR BLD AUTO: 13.4 % (ref 5–12)
NEUTROPHILS # BLD AUTO: 2.24 10*3/MM3 (ref 1.7–7)
NEUTROPHILS NFR BLD AUTO: 47.1 % (ref 42.7–76)
NRBC BLD AUTO-RTO: 0.2 /100 WBC (ref 0–0.2)
PLATELET # BLD AUTO: 291 10*3/MM3 (ref 140–450)
PMV BLD AUTO: 10.7 FL (ref 6–12)
POTASSIUM BLD-SCNC: 3.9 MMOL/L (ref 3.5–5.2)
RBC # BLD AUTO: 3.79 10*6/MM3 (ref 4.14–5.8)
SODIUM BLD-SCNC: 140 MMOL/L (ref 136–145)
WBC NRBC COR # BLD: 4.76 10*3/MM3 (ref 3.4–10.8)

## 2020-01-06 PROCEDURE — 85018 HEMOGLOBIN: CPT | Performed by: INTERNAL MEDICINE

## 2020-01-06 PROCEDURE — G0378 HOSPITAL OBSERVATION PER HR: HCPCS

## 2020-01-06 PROCEDURE — 85014 HEMATOCRIT: CPT | Performed by: INTERNAL MEDICINE

## 2020-01-06 PROCEDURE — 85025 COMPLETE CBC W/AUTO DIFF WBC: CPT | Performed by: HOSPITALIST

## 2020-01-06 PROCEDURE — 80048 BASIC METABOLIC PNL TOTAL CA: CPT | Performed by: HOSPITALIST

## 2020-01-06 PROCEDURE — 96376 TX/PRO/DX INJ SAME DRUG ADON: CPT

## 2020-01-06 PROCEDURE — 99213 OFFICE O/P EST LOW 20 MIN: CPT | Performed by: INTERNAL MEDICINE

## 2020-01-06 PROCEDURE — 63710000001 INSULIN REGULAR HUMAN PER 5 UNITS: Performed by: INTERNAL MEDICINE

## 2020-01-06 PROCEDURE — 82962 GLUCOSE BLOOD TEST: CPT

## 2020-01-06 PROCEDURE — 36415 COLL VENOUS BLD VENIPUNCTURE: CPT | Performed by: INTERNAL MEDICINE

## 2020-01-06 RX ADMIN — COLLAGENASE SANTYL: 250 OINTMENT TOPICAL at 01:15

## 2020-01-06 RX ADMIN — DAKIN'S SOLUTION 0.125% (QUARTER STRENGTH): 0.12 SOLUTION at 09:00

## 2020-01-06 RX ADMIN — DAKIN'S SOLUTION 0.125% (QUARTER STRENGTH): 0.12 SOLUTION at 01:15

## 2020-01-06 RX ADMIN — PANTOPRAZOLE SODIUM 40 MG: 40 INJECTION, POWDER, FOR SOLUTION INTRAVENOUS at 06:35

## 2020-01-06 RX ADMIN — COLLAGENASE SANTYL: 250 OINTMENT TOPICAL at 09:00

## 2020-01-06 RX ADMIN — VANCOMYCIN 125 MG: KIT at 01:15

## 2020-01-06 RX ADMIN — VANCOMYCIN 125 MG: KIT at 14:51

## 2020-01-06 RX ADMIN — SODIUM CHLORIDE, PRESERVATIVE FREE 10 ML: 5 INJECTION INTRAVENOUS at 12:21

## 2020-01-06 RX ADMIN — INSULIN HUMAN 2 UNITS: 100 INJECTION, SOLUTION PARENTERAL at 01:24

## 2020-01-06 RX ADMIN — VANCOMYCIN 125 MG: KIT at 06:34

## 2020-01-06 NOTE — PROGRESS NOTES
Discharge Planning Assessment  [facility]     Patient Name: Cam Gonsalves  MRN: [id number]  Today's Date: 1/6/2020    Admit Date: 1/2/2020    Discharge Needs Assessment    No documentation.       Discharge Plan     Row Name 01/06/20 1238       Plan    Plan Comments  Spoke with the guardian/Radha and she is agreeable to discharge plans and transportation. Spoke with Balaji and she gave report and fax number and they can accept back today. [facility]/6/2020 @ 16:00. Updated RN/Allison and gave her the started discharge packet. OZZY Rivera RN, CCP.     Final Discharge Disposition Code  04 - [facility] facility    Final Note  [place], medicaid bed, by [facility] transportation on 1/6/2020. OZZY Rivera RN, CCP.     Row Name 01/06/20 1104       Plan    Plan Comments  The patient transferred to [facility] from [address] [place] [address] on 1/3/20. The discharge plan is for the patient to return to [place], medicaid bed by ambulance at discharge. Family Radha/guardian agreeable to discharge plans. Balaji with [facility] is following and they can accept back at discharge. Will need to set up transportation at discharge. OZZY Rivera Rn, CCP.         Destination - Selection Complete      Service Provider Request Status Selected Services Address Phone Number Fax Number    [facility] [address] [facility] [address] [phone]      Durable Medical Equipment      Coordination has not been started for this encounter.      Dialysis/Infusion      Coordination has not been started for this encounter.      Home Medical Care      Coordination has not been started for this encounter.      Therapy      Coordination has not been started for this encounter.      Community Resources      Coordination has not been started for this encounter.          Demographic Summary    No documentation.       Functional Status    No documentation.        Psychosocial    No documentation.       Abuse/Neglect    No documentation.       Legal    No documentation.       Substance Abuse    No documentation.       Patient Forms    No documentation.           Rea Rivera RN

## 2020-01-06 NOTE — PROGRESS NOTES
[facility]    Physicians Statement of Medical Necessity for Ambulance Transportation    It is medically necessary for:    Patient Name: Cam Gonsalves    Insurance Information:  [employer] Medicare replacement #ID [id number] and [employer] of [place] #ID [id number]    To be transported by ambulance: [facility] on 1/6/20/20 @ 16:00    From (if nursing facility, specify level of care: skilled, [facility], etc): [facility], [facility]    To (specify level of care if nursing facility): [facility]    Date of Service: 1/2/2020-1/6/2020    For dialysis patients state date dialysis began: n/a    Diagnosis: Gastrointestinal hemorrhage    Past Medical/Surgical History:  Past Medical History:   Diagnosis Date   • Anxiety    • Aphasia    • Cerebral infarction (CMS/HCC)    • Chronic viral hepatitis C (CMS/HCC)    • Diabetes mellitus (CMS/HCC)    • Dysphagia, oropharyngeal phase    • Enterocolitis    • Hemiplegia and hemiparesis following cerebral infarction affecting right dominant side (CMS/HCC)    • Hyperlipidemia    • Hypertension    • Kidney failure, acute (CMS/HCC)    • Myocardial infarction (CMS/HCC)    • Rhabdomyolysis    • Stroke (CMS/HCC)       History reviewed. No pertinent surgical history.     Current Objective Medical Evidence(including physical exam finding to support reason for limitations):    CVA with paralysis    Other: Hemiplegia and hemiparesis following cerebral infarction affecting right dominant side    Physician Signature:           (RN,NP,PA,CAN, Discharge Planner) Date/Time: 1/6/2020 @ 16:00     Printed Name:    __________________________________    [facility]   Phone: [phone] Phone: [phone]   Fax: [phone] Fax: [phone]

## 2020-01-06 NOTE — PROGRESS NOTES
Case Management Discharge Note      Final Note: [place], medicaid bed, by [facility] transportation on 1/6/2020. OZZY Rivera RN, CCP.     Provided post acute provider list?: (declines)    Destination - Selection Complete      Service Provider Request Status Selected Services Address Phone Number Fax Number    [facility] [address] [facility] [id number] [phone]      Durable Medical Equipment      No service has been selected for the patient.      Dialysis/Infusion      No service has been selected for the patient.      Home Medical Care      No service has been selected for the patient.      Therapy      No service has been selected for the patient.      Community Resources      No service has been selected for the patient.        Transportation Services  Ambulance: [facility]    Final Discharge Disposition Code: 04 - intermediate care facility

## 2020-01-06 NOTE — DISCHARGE PLACEMENT REQUEST
"Major Noam (74 y.o. Male)     Date of Birth Social Security Number Address Home Phone MRN    1945  [id number]  [facility] [address] [phone] [id number]    Religious Marital Status          None        Admission Date Admission Type Admitting Provider Attending Provider Department, Room/Bed    1/2/20 Emergency [place], MD Preston Wagner Lyle E, MD [address], P489/1    Discharge Date Discharge Disposition Discharge Destination                       Attending Provider:  Mariusz Johnston MD    Allergies:  No Known Allergies    Isolation:  Spore   Infection:  C.difficile (01/03/20)   Code Status:  CPR    Ht:  170 cm (66.93\")   Wt:  44.3 kg (97 lb 11.2 oz)    Admission Cmt:  None   Principal Problem:  Gastrointestinal hemorrhage [K92.2]                 Active Insurance as of 1/2/2020     Primary Coverage     Payor Plan Insurance Group Employer/Plan Group    ANTHEM MEDICARE REPLACEMENT ANTHEM MEDICARE ADVANTAGE [id number]     Payor Plan Address Payor Plan Phone Number Payor Plan Fax Number Effective Dates    [address] [phone]  3/1/2019 - None Entered    [facility] [address]       Subscriber Name Subscriber Birth Date Member ID       NOAM ALDANA 1945 [id number]           Secondary Coverage     Payor Plan Insurance Group Employer/Plan Group    [employer] [place] WELL[employer]ARE MEDICAID      Payor Plan Address Payor Plan Phone Number Payor Plan Fax Number Effective Dates    [address] [address] [phone]  12/16/2019 - None Entered    [facility] [id number]       Subscriber Name Subscriber Birth Date Member ID       NOAM ALDANA 1945 [id number]                 Emergency Contacts      (Rel.) Home Phone Work Phone Mobile Phone    NIX,(NIECE)TARUN (Relative) [phone] -- --              "

## 2020-01-06 NOTE — PLAN OF CARE
Problem: Nutrition, Enteral (Adult)  Goal: Signs and Symptoms of Listed Potential Problems Will be Absent, Minimized or Managed (Nutrition, Enteral)  Outcome: Ongoing (interventions implemented as appropriate)  Flowsheets (Taken 1/6/2020 0804)  Problems Assessed (Enteral Nutrition): all  Problems Present (Enteral Nutrition): gastrointestinal complications; malnutrition; skin/mucosal integrity impairment     Pt having worsening diarrhea on current TF product (Isosource HN).  Will change to Impact Peptide 1.5 for better absorption, immune support as well as wound healing support. Goal rate 45 cc/hr. Water flush to remain 15 cc/hr while on IVF @ 100 cc/hr.

## 2020-01-06 NOTE — PROGRESS NOTES
Discharge Planning Assessment  [facility]     Patient Name: Cam Gonsalves  MRN: [id number]  Today's Date: 1/6/2020    Admit Date: 1/2/2020    Discharge Needs Assessment    No documentation.       Discharge Plan     Row Name 01/06/20 1106       Plan    Plan Comments  The patient transferred to [facility] from [address] [place] [address] on 1/3/20. The discharge plan is for the patient to return to [place], medicaid bed by ambulance at discharge. Family Radha/guardian agreeable to discharge plans. Balaji with [facility] is following and they can accept back at discharge. Will need to set up transportation at discharge. OZZY Rivera Rn, CCP.         Destination - Selection Complete      Service Provider Request Status Selected Services Address Phone Number Fax Number    [facility] [address] [facility] [address] [phone]      Durable Medical Equipment      Coordination has not been started for this encounter.      Dialysis/Infusion      Coordination has not been started for this encounter.      Home Medical Care      Coordination has not been started for this encounter.      Therapy      Coordination has not been started for this encounter.      Community Resources      Coordination has not been started for this encounter.          Demographic Summary    No documentation.       Functional Status    No documentation.       Psychosocial    No documentation.       Abuse/Neglect    No documentation.       Legal    No documentation.       Substance Abuse    No documentation.       Patient Forms    No documentation.           Rea Rivera RN

## 2020-01-06 NOTE — PROGRESS NOTES
Adult Nutrition  Assessment/PES    Patient Name:  Cam Gonsalves  YOB: 1945  MRN: [id number]  Admit Date:  1/2/2020    Assessment Date:  1/6/2020    Comments:  TF follow up    Pt having increasing diarrhea as TF rate has increased over weekend. He is on a standard fiber-free formula. Rate at 40 cc/hr, goal 60 cc/hr. Recently treated for c.diff colitis - currently on vanc.     Given intolerance/malabsorption and multiple areas of skin breakdown, will change TF formula to a peptide-based, immune enhancing one that will likely be better absorbed and support wound healing. Will no longer need to flush with Jay as the amino acids are already in the formula.     Goal rate for Impact Peptide 1.5 = 45 cc/hr. Water flush of 15 cc/hr while on IVF at 100 cc/hr. Provision: 1620 kcals, 101 gm pro, 831 cc free water + 360 cc in flushes.     D/w EUGENE Cooper. Will adjust orders & follow.     Reason for Assessment     Row Name 01/06/20 0787          Reason for Assessment    Reason For Assessment  follow-up protocol;TF/PN         Nutrition/Diet History     Row Name 01/06/20 0755          Nutrition/Diet History    Typical Food/Fluid Intake  NPO. Increasing loose stools over weekend as rate increased. Recently tx for cdiff.            Labs/Tests/Procedures/Meds     Row Name 01/06/20 3724          Labs/Procedures/Meds    Lab Results Reviewed  reviewed        Diagnostic Tests/Procedures    Diagnostic Test/Procedure Reviewed  reviewed        Medications    Pertinent Medications Reviewed  reviewed     Pertinent Medications Comments  insulin, ppi, abx (vanc), IVF @ 100 cc/hr         Physical Findings     Row Name 01/06/20 0434          Physical Findings    Overall Physical Appearance  generalized wasting     Gastrointestinal  feeding tube;diarrhea     Tubes  gastrostomy tube     Oral/Mouth Cavity  poor dentition     Skin  non-healing wound(s) Nathanael: 12, R shoulder PU, R cheek PI, R hip PI, L toe PI, L foot PI, L  knee PI            Nutrition Prescription Ordered     Row Name 01/06/20 0750          Nutrition Prescription PO    Current PO Diet  NPO        Nutrition Prescription EN    Enteral Route  Gastrostomy     Product  Isosource HN (Osmolite 1.2)     Modulars  Jay     Jay  1 packet     Jay frequency  2 times a day     TF Delivery Method  Continuous     Continuous TF Goal Rate (mL/hr)  60 mL/hr     Continuous TF Current Rate (mL/hr)  40 mL/hr     Water flush (mL)   15 mL     Water Flush Frequency  Per hour         Evaluation of Received Nutrient/Fluid Intake     Row Name 01/06/20 0751          Calories Evaluation    Enteral Calories (kcal)  1152     % of Kcal Needs  74        Protein Evaluation    Enteral Protein (gm)  52     % of Protein Needs  65        Intake Assessment    Energy/Calorie Requirement Assessment  not meeting needs     Protein Requirement Assessment  not meeting needs        Fluid Intake Evaluation    Enteral (Free Water) Fluid (mL)  777     Free Water Flush Fluid (mL)  360     Total Free Water Intake (mL)  1137 mL     IV Fluid (mL)  2400        EN Evaluation    TF Tolerance  Diarrhea     HOB  Greater than or equal to 30 degress               Problem/Interventions:    Problem 2     Row Name 01/06/20 0754          Nutrition Diagnoses Problem 2    Problem 2  Altered GI Function     Etiology (related to)  Medical Diagnosis     Gastrointestinal  Diarrhea     Infectious Disease  C. diff     Signs/Symptoms (evidenced by)  Formula intolerance             Intervention Goal     Row Name 01/06/20 0755          Intervention Goal    General  Nutrition support treatment     TF/PN  Adjust TF/PN;Tolerate TF at goal     Weight  Appropriate weight gain         Nutrition Intervention     Row Name 01/06/20 0755          Nutrition Intervention    RD/Tech Action  Recommend/ordered;Follow Tx progress;Care plan reviewd     Recommended/Ordered  EN         Nutrition Prescription     Row Name 01/06/20 0755          Nutrition Prescription  EN    Enteral Prescription  Enteral begin/change;Enteral to supply     Enteral Route  Gastrostomy     Product  Impact Peptide 1.5 pasha     TF Delivery Method  Continuous     Continuous TF Goal Rate (mL/hr)  45 mL/hr     Continuous TF Starting Rate (mL/hr)  25 mL/hr     Continuous TF Goal Volume (mL)  1080 mL     Water flush (mL)   15 mL while IVF infuse     Water Flush Frequency  Per hour     New EN Prescription Ordered?  Yes        EN to Supply    Kcal/Day  1620 Kcal/Day     Kcal/Kg  36.5 Kcal/Kg     Protein (gm/day)  101 gm/day     Meet Estimated Kcal Need (%)  104 %     Meet Estimated Protein Need (%)  127 %     TF Free H2O (mL)  831 mL     Total Free H2O (mL/day)  1191 mL/day         Education/Evaluation     Row Name 01/06/20 0757          Education    Education  Education not appropriate at this time     Please explain  Patient nonverbal        Monitor/Evaluation    Monitor  Per protocol;I&O;Pertinent labs;TF delivery/tolerance;Weight;Skin status;Symptoms     Education Follow-up  Other (comment) discussed changes with EUGENE Cooper.           Electronically signed by:  Genesis Patten RD  01/06/20 7:59 AM

## 2020-01-06 NOTE — DISCHARGE SUMMARY
PHYSICIAN DISCHARGE SUMMARY                                                                        [facility]    Patient Identification:  Name: Cam Gonsalves  Age: 74 y.o.  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          PMHX:   Past Medical History:   Diagnosis Date   • Anxiety    • Aphasia    • Cerebral infarction (CMS/HCC)    • Chronic viral hepatitis C (CMS/HCC)    • Diabetes mellitus (CMS/HCC)    • Dysphagia, oropharyngeal phase    • Enterocolitis    • Hemiplegia and hemiparesis following cerebral infarction affecting right dominant side (CMS/HCC)    • Hyperlipidemia    • Hypertension    • Kidney failure, acute (CMS/HCC)    • Myocardial infarction (CMS/HCC)    • Rhabdomyolysis    • Stroke (CMS/HCC)      PSHX: History reviewed. No pertinent surgical history.    Hospital Course: Cam Gonsalves  is a 74 y.o. male with a history of sequelae of previous stroke including right hemiplegia a aphasia dysphasia status post PEG, diabetes, chronic anticoagulation who presents to [facility] with GI bleeding.  He was undergoing treatment for C. difficile colitis at his facility when rectal bleeding was noted.  No history is obtainable from the patient due to his a aphasia.  He is moving his left side  spontaneously but not moving his right.  He does appear malnourished and there is evidence of bright red blood in his diaper.        The patient was admitted to the hospital and seen by GI medicine.  His anticoagulation was placed on hold and he was found positive for C. difficile and treated with vancomycin.  His bleeding seemed to subside and he looks stable after being in the hospital for a few days and looked well enough to go back to the skilled nursing facility.  GI felt that his rectal bleeding was due to colitis from the C. difficile and did not recommend doing any endoscopy at this time.  He will continue with tube feedings and anticoagulations on hold for now and till seen by his primary care and they can decide whether or not the risk versus benefit of restarting it.    Consults:     Consults     Date and Time Order Name Status Description    1/2/2020 2051 Inpatient Gastroenterology Consult Completed     1/2/2020 1838 [facility] (on-call MD unless specified) Details Completed     1/2/2020 1742 [facility] (on-call MD unless specified) Details Completed         Results from last 7 days   Lab Units 01/06/20  0832 01/06/20  0700   WBC 10*3/mm3  --  4.76   HEMOGLOBIN g/dL 10.1* 10.1*   HEMATOCRIT % 31.2* 31.2*   PLATELETS 10*3/mm3  --  291     Results from last 7 days   Lab Units 01/06/20  0700   SODIUM mmol/L 140   POTASSIUM mmol/L 3.9   CHLORIDE mmol/L 108*   CO2 mmol/L 22.5   BUN mg/dL 11   CREATININE mg/dL 0.45*   GLUCOSE mg/dL 139*   CALCIUM mg/dL 8.1*     Significant Diagnostic Studies:   WBC   Date Value Ref Range Status   01/06/2020 4.76 3.40 - 10.80 10*3/mm3 Final     Hemoglobin   Date Value Ref Range Status   01/06/2020 10.1 (L) 13.0 - 17.7 g/dL Final     Hematocrit   Date Value Ref Range Status   01/06/2020 31.2 (L) 37.5 - 51.0 % Final     Platelets   Date Value Ref Range Status   01/06/2020 291 140 - 450 10*3/mm3 Final     Sodium   Date Value Ref Range Status   01/06/2020 140 136 - 145 mmol/L Final     Potassium    Date Value Ref Range Status   01/06/2020 3.9 3.5 - 5.2 mmol/L Final     Chloride   Date Value Ref Range Status   01/06/2020 108 (H) 98 - 107 mmol/L Final     CO2   Date Value Ref Range Status   01/06/2020 22.5 22.0 - 29.0 mmol/L Final     BUN   Date Value Ref Range Status   01/06/2020 11 8 - 23 mg/dL Final     Creatinine   Date Value Ref Range Status   01/06/2020 0.45 (L) 0.76 - 1.27 mg/dL Final     Glucose   Date Value Ref Range Status   01/06/2020 139 (H) 65 - 99 mg/dL Final     Calcium   Date Value Ref Range Status   01/06/2020 8.1 (L) 8.6 - 10.5 mg/dL Final     Magnesium   Date Value Ref Range Status   01/04/2020 1.9 1.6 - 2.4 mg/dL Final     Phosphorus   Date Value Ref Range Status   01/04/2020 2.7 2.5 - 4.5 mg/dL Final     No results found for: AST, ALT, ALKPHOS  No results found for: APTT, INR  No results found for: COLORU, CLARITYU, SPECGRAV, PHUR, PROTEINUR, GLUCOSEU, KETONESU, BLOODU, NITRITE, LEUKOCYTESUR, BILIRUBINUR, UROBILINOGEN, RBCUA, WBCUA, BACTERIA, UACOMMENT  No results found for: TROPONINT, TROPONINI, BNP  No components found for: HGBA1C;2  No components found for: TSH;2  Imaging Results (All)     Procedure Component Value Units Date/Time    XR Abdomen 2+ VW with Chest 1 VW [[id number]] Collected:  01/03/20 0740     Updated:  01/03/20 0928    Narrative:       XR ABDOMEN 2+ VIEWS WITH CHEST 1 VIEW-  01/02/2020     HISTORY:GI bleed.     FINDINGS: The bowel gas pattern is unremarkable with no evidence of free  air or bowel dilatation. There are degenerative changes in the spine.  Percutaneous gastrostomy tube is seen with its tip in the left upper  quadrant. Small calcifications are seen in the midline of the lower  pelvis which may be in the prostate gland.     Heart and lungs appear unremarkable except for a small amount of aortic  calcification.       Impression:       No acute process identified.     This report was finalized on 1/3/2020 9:25 AM by Dr. Jake Vazquez M.D.           Lab  Results (last 7 days)     Procedure Component Value Units Date/Time    POC Glucose Once [[id number]]  (Normal) Collected:  01/06/20 1035    Specimen:  Blood Updated:  01/06/20 1036     Glucose 113 mg/dL     Hemoglobin & Hematocrit, Blood [[id number]]  (Abnormal) Collected:  01/06/20 0832    Specimen:  Blood from Hand, Right Updated:  01/06/20 0908     Hemoglobin 10.1 g/dL      Hematocrit 31.2 %     Basic Metabolic Panel [[id number]]  (Abnormal) Collected:  01/06/20 0700    Specimen:  Blood Updated:  01/06/20 0753     Glucose 139 mg/dL      BUN 11 mg/dL      Creatinine 0.45 mg/dL      Sodium 140 mmol/L      Potassium 3.9 mmol/L      Chloride 108 mmol/L      CO2 22.5 mmol/L      Calcium 8.1 mg/dL      eGFR  African Amer >150 mL/min/1.73      BUN/Creatinine Ratio 24.4     Anion Gap 9.5 mmol/L     Narrative:       GFR Normal >60  Chronic Kidney Disease <60  Kidney Failure <15      CBC & Differential [[id number]] Collected:  01/06/20 0700    Specimen:  Blood Updated:  01/06/20 0738    Narrative:       The following orders were created for panel order CBC & Differential.  Procedure                               Abnormality         Status                     ---------                               -----------         ------                     CBC Auto Differential[[id number]]        Abnormal            Final result                 Please view results for these tests on the individual orders.    CBC Auto Differential [[id number]]  (Abnormal) Collected:  01/06/20 0700    Specimen:  Blood Updated:  01/06/20 0738     WBC 4.76 10*3/mm3      RBC 3.79 10*6/mm3      Hemoglobin 10.1 g/dL      Hematocrit 31.2 %      MCV 82.3 fL      MCH 26.6 pg      MCHC 32.4 g/dL      RDW 13.5 %      RDW-SD 40.5 fl      MPV 10.7 fL      Platelets 291 10*3/mm3      Neutrophil % 47.1 %      Lymphocyte % 35.9 %      Monocyte % 13.4 %      Eosinophil % 3.2 %      Basophil % 0.2 %      Immature Grans % 0.2 %      Neutrophils, Absolute 2.24 10*3/mm3       Lymphocytes, Absolute 1.71 10*3/mm3      Monocytes, Absolute 0.64 10*3/mm3      Eosinophils, Absolute 0.15 10*3/mm3      Basophils, Absolute 0.01 10*3/mm3      Immature Grans, Absolute 0.01 10*3/mm3      nRBC 0.2 /100 WBC     POC Glucose Once [[id number]]  (Normal) Collected:  01/06/20 0646    Specimen:  Blood Updated:  01/06/20 0647     Glucose 119 mg/dL     POC Glucose Once [[id number]]  (Abnormal) Collected:  01/06/20 0119    Specimen:  Blood Updated:  01/06/20 0120     Glucose 156 mg/dL     Hemoglobin & Hematocrit, Blood [[id number]]  (Abnormal) Collected:  01/05/20 2354    Specimen:  Blood Updated:  01/06/20 0046     Hemoglobin 9.6 g/dL      Hematocrit 30.6 %     POC Glucose Once [[id number]]  (Normal) Collected:  01/05/20 2029    Specimen:  Blood Updated:  01/05/20 2033     Glucose 113 mg/dL     POC Glucose Once [[id number]]  (Normal) Collected:  01/05/20 1835    Specimen:  Blood Updated:  01/05/20 1836     Glucose 113 mg/dL     Hemoglobin & Hematocrit, Blood [[id number]]  (Abnormal) Collected:  01/05/20 1606    Specimen:  Blood Updated:  01/05/20 1615     Hemoglobin 9.3 g/dL      Hematocrit 28.5 %     POC Glucose Once [[id number]]  (Abnormal) Collected:  01/05/20 1048    Specimen:  Blood Updated:  01/05/20 1049     Glucose 136 mg/dL     CBC & Differential [[id number]] Collected:  01/05/20 0719    Specimen:  Blood Updated:  01/05/20 0849    Narrative:       The following orders were created for panel order CBC & Differential.  Procedure                               Abnormality         Status                     ---------                               -----------         ------                     CBC Auto Differential[[id number]]        Abnormal            Final result                 Please view results for these tests on the individual orders.    CBC Auto Differential [[id number]]  (Abnormal) Collected:  01/05/20 0719    Specimen:  Blood Updated:  01/05/20 0849     WBC 3.05 10*3/mm3      RBC 3.44 10*6/mm3       Hemoglobin 9.1 g/dL      Hematocrit 27.9 %      MCV 81.1 fL      MCH 26.5 pg      MCHC 32.6 g/dL      RDW 13.3 %      RDW-SD 39.1 fl      MPV 11.1 fL      Platelets 276 10*3/mm3     Manual Differential [[id number]]  (Abnormal) Collected:  01/05/20 0719    Specimen:  Blood Updated:  01/05/20 0849     Neutrophil % 54.3 %      Lymphocyte % 21.3 %      Monocyte % 24.5 %      Neutrophils Absolute 1.66 10*3/mm3      Lymphocytes Absolute 0.65 10*3/mm3      Monocytes Absolute 0.75 10*3/mm3      Anisocytosis Slight/1+     [place] Cells Slight/1+     Poikilocytes Large/3+     WBC Morphology Normal     Platelet Morphology Normal    Basic Metabolic Panel [[id number]]  (Abnormal) Collected:  01/05/20 0719    Specimen:  Blood Updated:  01/05/20 0826     Glucose 128 mg/dL      BUN 12 mg/dL      Creatinine 0.45 mg/dL      Sodium 138 mmol/L      Potassium 3.5 mmol/L      Chloride 107 mmol/L      CO2 21.8 mmol/L      Calcium 7.7 mg/dL      eGFR  African Amer >150 mL/min/1.73      BUN/Creatinine Ratio 26.7     Anion Gap 9.2 mmol/L     Narrative:       GFR Normal >60  Chronic Kidney Disease <60  Kidney Failure <15      POC Glucose Once [[id number]]  (Normal) Collected:  01/05/20 0630    Specimen:  Blood Updated:  01/05/20 0632     Glucose 129 mg/dL     Hemoglobin & Hematocrit, Blood [[id number]]  (Abnormal) Collected:  01/05/20 0003    Specimen:  Blood Updated:  01/05/20 0104     Hemoglobin 9.1 g/dL      Hematocrit 27.8 %     POC Glucose Once [[id number]]  (Abnormal) Collected:  01/05/20 0034    Specimen:  Blood Updated:  01/05/20 0035     Glucose 138 mg/dL     POC Glucose Once [[id number]]  (Normal) Collected:  01/04/20 1855    Specimen:  Blood Updated:  01/04/20 1857     Glucose 111 mg/dL     Hemoglobin & Hematocrit, Blood [[id number]]  (Abnormal) Collected:  01/04/20 1556    Specimen:  Blood Updated:  01/04/20 1618     Hemoglobin 9.8 g/dL      Hematocrit 31.1 %     POC Glucose Once [[id number]]  (Abnormal) Collected:  01/04/20 1201     Specimen:  Blood Updated:  01/04/20 1214     Glucose 139 mg/dL     CBC (No Diff) [[id number]]  (Abnormal) Collected:  01/04/20 0810    Specimen:  Blood Updated:  01/04/20 0906     WBC 3.41 10*3/mm3      RBC 3.61 10*6/mm3      Hemoglobin 9.7 g/dL      Hematocrit 29.5 %      MCV 81.7 fL      MCH 26.9 pg      MCHC 32.9 g/dL      RDW 13.2 %      RDW-SD 39.8 fl      MPV 10.9 fL      Platelets 285 10*3/mm3     Renal Function Panel [[id number]]  (Abnormal) Collected:  01/04/20 0810    Specimen:  Blood Updated:  01/04/20 0905     Glucose 113 mg/dL      BUN 10 mg/dL      Creatinine 0.62 mg/dL      Sodium 141 mmol/L      Potassium 3.8 mmol/L      Chloride 108 mmol/L      CO2 23.0 mmol/L      Calcium 8.1 mg/dL      Albumin 2.40 g/dL      Phosphorus 2.7 mg/dL      Anion Gap 10.0 mmol/L      BUN/Creatinine Ratio 16.1     eGFR  African Amer >150 mL/min/1.73     Narrative:       GFR Normal >60  Chronic Kidney Disease <60  Kidney Failure <15      Magnesium [[id number]]  (Normal) Collected:  01/04/20 0810    Specimen:  Blood Updated:  01/04/20 0904     Magnesium 1.9 mg/dL     Hemoglobin & Hematocrit, Blood [[id number]]  (Abnormal) Collected:  01/04/20 0810    Specimen:  Blood Updated:  01/04/20 0855     Hemoglobin 9.7 g/dL      Hematocrit 29.5 %     POC Glucose Once [[id number]]  (Normal) Collected:  01/04/20 0627    Specimen:  Blood Updated:  01/04/20 0629     Glucose 105 mg/dL     POC Glucose Once [[id number]]  (Normal) Collected:  01/04/20 0039    Specimen:  Blood Updated:  01/04/20 0041     Glucose 115 mg/dL     Hemoglobin & Hematocrit, Blood [[id number]]  (Abnormal) Collected:  01/04/20 0007    Specimen:  Blood Updated:  01/04/20 0034     Hemoglobin 9.9 g/dL      Hematocrit 31.7 %     POC Glucose Once [[id number]]  (Normal) Collected:  01/03/20 2032    Specimen:  Blood Updated:  01/03/20 2034     Glucose 115 mg/dL     Hemoglobin & Hematocrit, Blood [[id number]]  (Abnormal) Collected:  01/03/20 1603    Specimen:  Blood Updated:  01/03/20  1618     Hemoglobin 10.9 g/dL      Hematocrit 34.5 %     POC Glucose Once [[id number]]  (Normal) Collected:  01/03/20 1539    Specimen:  Blood Updated:  01/03/20 1540     Glucose 98 mg/dL     POC Glucose Once [[id number]]  (Normal) Collected:  01/03/20 1145    Specimen:  Blood Updated:  01/03/20 1207     Glucose 113 mg/dL     POC Glucose Once [[id number]]  (Normal) Collected:  01/03/20 0609    Specimen:  Blood Updated:  01/03/20 0611     Glucose 127 mg/dL     Ferritin [[id number]]  (Normal) Collected:  01/03/20 0317    Specimen:  Blood Updated:  01/03/20 0449     Ferritin 115.00 ng/mL     Comprehensive Metabolic Panel [[id number]]  (Abnormal) Collected:  01/03/20 0317    Specimen:  Blood Updated:  01/03/20 0440     Glucose 129 mg/dL      BUN 15 mg/dL      Creatinine 0.69 mg/dL      Sodium 142 mmol/L      Potassium 4.0 mmol/L      Chloride 104 mmol/L      CO2 26.1 mmol/L      Calcium 8.7 mg/dL      Total Protein 6.3 g/dL      Albumin 3.10 g/dL      ALT (SGPT) 18 U/L      AST (SGOT) 17 U/L      Alkaline Phosphatase 64 U/L      Total Bilirubin 0.3 mg/dL      eGFR  African Amer 135 mL/min/1.73      Globulin 3.2 gm/dL      A/G Ratio 1.0 g/dL      BUN/Creatinine Ratio 21.7     Anion Gap 11.9 mmol/L     Narrative:       GFR Normal >60  Chronic Kidney Disease <60  Kidney Failure <15      Iron Profile [[id number]]  (Abnormal) Collected:  01/03/20 0317    Specimen:  Blood Updated:  01/03/20 0440     Iron 31 mcg/dL      Iron Saturation 13 %      Transferrin 159 mg/dL      TIBC 237 mcg/dL     Phosphorus [[id number]]  (Normal) Collected:  01/03/20 0317    Specimen:  Blood Updated:  01/03/20 0440     Phosphorus 4.0 mg/dL     Magnesium [[id number]]  (Normal) Collected:  01/03/20 0317    Specimen:  Blood Updated:  01/03/20 0440     Magnesium 2.0 mg/dL     Protime-INR [[id number]]  (Abnormal) Collected:  01/03/20 0317    Specimen:  Blood from Arm, Right Updated:  01/03/20 0433     Protime 14.6 Seconds      INR 1.17    Hemoglobin A1c  [[id number]]  (Abnormal) Collected:  01/03/20 0317    Specimen:  Blood Updated:  01/03/20 0422     Hemoglobin A1C 6.96 %     Narrative:       Hemoglobin A1C Ranges:    Increased Risk for Diabetes  5.7% to 6.4%  Diabetes                     >= 6.5%  Diabetic Goal                < 7.0%    CBC Auto Differential [[id number]]  (Abnormal) Collected:  01/03/20 0317    Specimen:  Blood Updated:  01/03/20 0415     WBC 7.71 10*3/mm3      RBC 3.96 10*6/mm3      Hemoglobin 10.4 g/dL      Hematocrit 32.9 %      MCV 83.1 fL      MCH 26.3 pg      MCHC 31.6 g/dL      RDW 13.1 %      RDW-SD 40.1 fl      MPV 12.1 fL      Platelets 321 10*3/mm3      Neutrophil % 67.1 %      Lymphocyte % 19.2 %      Monocyte % 11.5 %      Eosinophil % 1.3 %      Basophil % 0.5 %      Immature Grans % 0.4 %      Neutrophils, Absolute 5.17 10*3/mm3      Lymphocytes, Absolute 1.48 10*3/mm3      Monocytes, Absolute 0.89 10*3/mm3      Eosinophils, Absolute 0.10 10*3/mm3      Basophils, Absolute 0.04 10*3/mm3      Immature Grans, Absolute 0.03 10*3/mm3      nRBC 0.1 /100 WBC     Hemoglobin & Hematocrit, Blood [[id number]]  (Abnormal) Collected:  01/03/20 0317    Specimen:  Blood Updated:  01/03/20 0413     Hemoglobin 10.4 g/dL      Hematocrit 32.9 %     Hemoglobin & Hematocrit, Blood [[id number]]  (Abnormal) Collected:  01/02/20 2356    Specimen:  Blood Updated:  01/03/20 0033     Hemoglobin 11.4 g/dL      Hematocrit 34.2 %     POC Glucose Once [[id number]]  (Normal) Collected:  01/03/20 0027    Specimen:  Blood Updated:  01/03/20 0028     Glucose 115 mg/dL     Lactic Acid, Plasma [[id number]]  (Normal) Collected:  01/02/20 2143    Specimen:  Blood Updated:  01/02/20 2222     Lactate 1.4 mmol/L     Comprehensive Metabolic Panel [[id number]]  (Abnormal) Collected:  01/02/20 1604    Specimen:  Blood Updated:  01/02/20 1655     Glucose 145 mg/dL      BUN 17 mg/dL      Creatinine 0.68 mg/dL      Sodium 141 mmol/L      Potassium 4.0 mmol/L      Chloride 101 mmol/L       CO2 29.5 mmol/L      Calcium 9.3 mg/dL      Total Protein 7.0 g/dL      Albumin 3.20 g/dL      ALT (SGPT) 22 U/L      AST (SGOT) 19 U/L      Alkaline Phosphatase 72 U/L      Total Bilirubin 0.2 mg/dL      eGFR  Sheela Amer 138 mL/min/1.73      Globulin 3.8 gm/dL      A/G Ratio 0.8 g/dL      BUN/Creatinine Ratio 25.0     Anion Gap 10.5 mmol/L     Narrative:       GFR Normal >60  Chronic Kidney Disease <60  Kidney Failure <15      Magnesium [[id number]]  (Normal) Collected:  01/02/20 1604    Specimen:  Blood Updated:  01/02/20 1648     Magnesium 2.1 mg/dL     Protime-INR [[id number]]  (Abnormal) Collected:  01/02/20 1604    Specimen:  Blood Updated:  01/02/20 1632     Protime 14.8 Seconds      INR 1.19    CBC & Differential [[id number]] Collected:  01/02/20 1604    Specimen:  Blood Updated:  01/02/20 1628    Narrative:       The following orders were created for panel order CBC & Differential.  Procedure                               Abnormality         Status                     ---------                               -----------         ------                     CBC Auto Differential[[id number]]        Abnormal            Final result                 Please view results for these tests on the individual orders.    CBC Auto Differential [[id number]]  (Abnormal) Collected:  01/02/20 1604    Specimen:  Blood Updated:  01/02/20 1628     WBC 10.05 10*3/mm3      RBC 4.13 10*6/mm3      Hemoglobin 11.3 g/dL      Hematocrit 33.5 %      MCV 81.1 fL      MCH 27.4 pg      MCHC 33.7 g/dL      RDW 13.2 %      RDW-SD 38.7 fl      MPV 11.7 fL      Platelets 321 10*3/mm3      Neutrophil % 74.7 %      Lymphocyte % 12.3 %      Monocyte % 10.8 %      Eosinophil % 1.4 %      Basophil % 0.3 %      Immature Grans % 0.5 %      Neutrophils, Absolute 7.50 10*3/mm3      Lymphocytes, Absolute 1.24 10*3/mm3      Monocytes, Absolute 1.09 10*3/mm3      Eosinophils, Absolute 0.14 10*3/mm3      Basophils, Absolute 0.03 10*3/mm3      Immature Grans,  "Absolute 0.05 10*3/mm3      nRBC 0.0 /100 WBC         /75 (BP Location: Right arm, Patient Position: Lying)   Pulse 79   Temp 97.5 °F (36.4 °C) (Oral)   Resp 16   Ht 170 cm (66.93\")   Wt 44.3 kg (97 lb 11.2 oz)   SpO2 99%   BMI 15.33 kg/m²     Discharge Exam:  General Appearance:    confused, no distress                          Head:    Normocephalic, without obvious abnormality, atraumatic                          Eyes:                            Throat:   Lips, tongue, gums normal                          Neck:   Supple, symmetrical, trachea midline, no JVD                        Lungs:     Clear to auscultation bilaterally, respirations unlabored                Chest Wall:    No tenderness or deformity                        Heart:    Regular rate and rhythm, S1 and S2 normal, no murmur,no  Rub or gallop                  Abdomen:     Soft, non-tender, bowel sounds active, no masses, no organomegaly                  Extremities:   Extremities normal, atraumatic, no cyanosis or edema                             Skin:   Skin is warm and dry,  no rashes or palpable lesions                  Neurologic:   Confused and weak from previous stroke     Disposition:  Skilled nursing facility  Diet Order   Procedures   • NPO Diet   Continue with tube feedings  Patient Instructions:      Discharge Medications      New Medications      Instructions Start Date   vancomycin 50 MG/ML reconstituted solution oral solution reconstituted  Replaces:  vancomycin 125 MG capsule   125 mg, Oral, Every 6 Hours Scheduled         Continue These Medications      Instructions Start Date   acetaminophen 160 MG/5ML solution  Commonly known as:  TYLENOL   650 mg, Per G Tube, Every 6 Hours PRN      atorvastatin 80 MG tablet  Commonly known as:  LIPITOR   80 mg, Per G Tube, Nightly      collagenase 250 UNIT/GM ointment   Topical, 2 Times Daily, R hip R shoulder       guaiFENesin 100 MG/5ML solution oral solution  Commonly known as:  " ROBITUSSIN   200 mg, Per G Tube, Every 4 Hours PRN      insulin aspart 100 UNIT/ML solution pen-injector sc pen  Commonly known as:  novoLOG FLEXPEN   Subcutaneous, 3 Times Daily With Meals, Per sliding scale from NH       ipratropium-albuterol 0.5-2.5 mg/3 ml nebulizer  Commonly known as:  DUO-NEB   3 mL, Nebulization, Every 4 Hours PRN      omeprazole 2 mg/mL in sodium bicarbonate injection 8.4%   10 mg, Per G Tube, Daily      povidone-iodine 10 % external solution  Commonly known as:  BETADINE   Topical, Daily, Right cheek       sodium hypochlorite 0.125 % topical solution  Commonly known as:  DAKIN'S   Topical, Every 12 Hours Scheduled, L great toe and 2nd toe       terazosin 2 MG capsule  Commonly known as:  HYTRIN   2 mg, Per G Tube, Nightly         Stop These Medications    apixaban 5 MG tablet tablet  Commonly known as:  ELIQUIS     aspirin 81 MG chewable tablet     vancomycin 125 MG capsule  Commonly known as:  VANCOCIN  Replaced by:  vancomycin 50 MG/ML reconstituted solution oral solution reconstituted          No future appointments.   Contact information for follow-up providers     Enrique Carver MD .    Specialty:  Family Medicine  Contact information:  [address] [facility] [address] [phone]                   Contact information for after-discharge care     Destination     [facility] .    Service:  Intermediate Care  Contact information:  [address] [facility] [address] [phone]                           Discharge Order (From admission, onward)     Start     Ordered    01/06/20 1412  Discharge patient  Once     Expected Discharge Date:  01/06/20    Discharge Disposition:  Skilled Nursing Facility (DC - External)    Physician of Record for Attribution - Please select from Treatment Team:  ANKIT JEFF [[id number]]    Review needed by CMO to determine Physician of Record:  No       Question Answer Comment   Physician of Record for  Attribution - Please select from Treatment Team MARIUSZ JOHNSTON    Review needed by CMO to determine Physician of Record No        01/06/20 1414                Total time spent discharging patient including evaluation,post hospitalization follow up,  medication and post hospitalization instructions and education total time exceeds 30 minutes.    Signed:  aMriusz Johnston MD  1/6/2020  2:15 PM

## 2020-01-06 NOTE — PLAN OF CARE
Tube feeds continue per MD order. No residuals, able to tolerate being increased to rate of 60mL/hr. Turned q 2 hrs. Many skin issues continue. Dressing changes done to left foot and right shoulder per MD order. Pt attempts to verbalize but is garbled and not understandable. Restless in bed and almost in constant motion. Slept only 2-3 hours this shift. IVF continue. Meds given through g tube.

## 2020-01-06 NOTE — PROGRESS NOTES
[facility]  Inpatient Progress Note    Reason for Follow Up: C. difficile, rectal bleeding    Subjective     Interval History:   Patient is unresponsive verbally due to history of stroke.  His tube feed formula was switched this morning due to continued diarrhea.  He takes nothing by mouth.  No family is at bedside.  Discussed with nursing.    Current Facility-Administered Medications:   •  acetaminophen (TYLENOL) tablet 650 mg, 650 mg, Oral, Q4H PRN, 650 mg at 01/04/20 2126 **OR** acetaminophen (TYLENOL) 160 MG/5ML solution 650 mg, 650 mg, Oral, Q4H PRN **OR** acetaminophen (TYLENOL) suppository 650 mg, 650 mg, Rectal, Q4H PRN, Efraín Fitch MD  •  atorvastatin (LIPITOR) tablet 80 mg, 80 mg, Per G Tube, Nightly, Efraín Fitch MD, 80 mg at 01/05/20 2156  •  collagenase ointment, , Topical, BID, Efraín Fitch MD  •  dextrose (D50W) 25 g/ 50mL Intravenous Solution 25 g, 25 g, Intravenous, Q15 Min PRN, Efraín Fitch MD  •  dextrose (GLUTOSE) oral gel 15 g, 15 g, Oral, Q15 Min PRN, Efraín Fitch MD  •  glucagon (human recombinant) (GLUCAGEN DIAGNOSTIC) injection 1 mg, 1 mg, Subcutaneous, Q15 Min PRN, Efraín Fitch MD  •  insulin regular (humuLIN R,novoLIN R) injection 0-7 Units, 0-7 Units, Subcutaneous, Q6H, Efraín Fitch MD, 2 Units at 01/06/20 0124  •  ipratropium-albuterol (DUO-NEB) nebulizer solution 3 mL, 3 mL, Nebulization, Q6H PRN, Efraín Fitch MD  •  ondansetron (ZOFRAN) tablet 4 mg, 4 mg, Oral, Q6H PRN **OR** ondansetron (ZOFRAN) injection 4 mg, 4 mg, Intravenous, Q6H PRN, Efraín Fitch MD  •  pantoprazole (PROTONIX) injection 40 mg, 40 mg, Intravenous, BID AC, Efraín Fitch MD, 40 mg at 01/06/20 0635  •  Pharmacy Consult, , Does not apply, Continuous PRN, Efraín Fitch MD  •  povidone-iodine (BETADINE) external solution, , Topical, Daily, Efraín Fitch MD  •  [COMPLETED] Insert peripheral IV, , , Once **AND**  sodium chloride 0.9 % flush 10 mL, 10 mL, Intravenous, PRN, Rod Naidu MD  •  sodium chloride 0.9 % flush 10 mL, 10 mL, Intravenous, Q12H, Efraín Fitch MD, 10 mL at 01/05/20 2158  •  sodium chloride 0.9 % flush 10 mL, 10 mL, Intravenous, PRN, Efraín Fitch MD  •  sodium chloride 0.9 % infusion, 100 mL/hr, Intravenous, Continuous, Efraín Fitch MD, Last Rate: 100 mL/hr at 01/05/20 1935, 100 mL/hr at 01/05/20 1935  •  sodium hypochlorite (DAKIN'S 1/4 STRENGTH) 0.125 % topical solution 0.125% solution, , Topical, Q12H, Efraín Fitch MD  •  terazosin (HYTRIN) capsule 2 mg, 2 mg, Per G Tube, Nightly, Efraín Fitch MD, 2 mg at 01/05/20 2156  •  vancomycin oral solution reconstituted 125 mg, 125 mg, Oral, Q6H, Efraín Fitch MD, 125 mg at 01/06/20 0634  Review of Systems:    Review of systems could not be obtained due to  patient nonverbal.    Objective     Vital Signs  Temp:  [97 °F (36.1 °C)-98.1 °F (36.7 °C)] 97.5 °F (36.4 °C)  Heart Rate:  [68-85] 79  Resp:  [16-18] 16  BP: (126-137)/(70-78) 133/75  Body mass index is 15.33 kg/m².    Intake/Output Summary (Last 24 hours) at 1/6/2020 1056  Last data filed at 1/6/2020 0657  Gross per 24 hour   Intake 3464 ml   Output --   Net 3464 ml     No intake/output data recorded.     Physical Exam:   General: patient awake, alert, nonverbal   Eyes: Normal lids and lashes, no scleral icterus   Neck: supple, normal ROM   Skin: warm and dry, not jaundiced   Abdomen: soft, nontender, nondistended  Extremities: no rash or edema         Results Review:     I reviewed the patient's new clinical results.    Results from last 7 days   Lab Units 01/06/20  0832 01/06/20  0700 01/05/20  2354  01/05/20  0719  01/04/20  0810   WBC 10*3/mm3  --  4.76  --   --  3.05*  --  3.41   HEMOGLOBIN g/dL 10.1* 10.1* 9.6*   < > 9.1*   < > 9.7*  9.7*   HEMATOCRIT % 31.2* 31.2* 30.6*   < > 27.9*   < > 29.5*  29.5*   PLATELETS 10*3/mm3  --  291  --   --  276  --  285     < > = values in this interval not displayed.     Results from last 7 days   Lab Units 01/06/20  0700 01/05/20  0719 01/04/20  0810 01/03/20  0317 01/02/20  1604   SODIUM mmol/L 140 138 141 142 141   POTASSIUM mmol/L 3.9 3.5 3.8 4.0 4.0   CHLORIDE mmol/L 108* 107 108* 104 101   CO2 mmol/L 22.5 21.8* 23.0 26.1 29.5*   BUN mg/dL 11 12 10 15 17   CREATININE mg/dL 0.45* 0.45* 0.62* 0.69* 0.68*   CALCIUM mg/dL 8.1* 7.7* 8.1* 8.7 9.3   BILIRUBIN mg/dL  --   --   --  0.3 0.2   ALK PHOS U/L  --   --   --  64 72   ALT (SGPT) U/L  --   --   --  18 22   AST (SGOT) U/L  --   --   --  17 19   GLUCOSE mg/dL 139* 128* 113* 129* 145*     Results from last 7 days   Lab Units 01/03/20  0317 01/02/20  1604   INR  1.17* 1.19*     Lab Results   Lab Value Date/Time    LIPASE 38 08/30/2019 1530       Radiology:  XR Abdomen 2+ VW with Chest 1 VW   Final Result   No acute process identified.       This report was finalized on 1/3/2020 9:25 AM by Dr. Jake Vazquez M.D.              Assessment/Plan     Patient Active Problem List   Diagnosis   • Gastrointestinal hemorrhage   • Clostridium difficile enterocolitis   • Sequelae, post-stroke   • Type 2 diabetes mellitus with other specified complication (CMS/HCC)   • Long term current use of anticoagulant therapy   • Severe malnutrition (CMS/HCC)       Assessment:  1. C. difficile colitis  2. Diarrhea secondary to #1  3. Malnutrition      Plan:  · Tube feed formulation was changed this morning-we will follow for improvement in bowel output on new formulation.  · Continue vancomycin  · Per nursing, palliative care consult has been ordered.    I discussed the patients findings and my recommendations with patient and nursing staff.    Jocelyn Covington MD

## 2020-09-30 ENCOUNTER — LAB REQUISITION (OUTPATIENT)
Dept: LAB | Facility: HOSPITAL | Age: 75
End: 2020-09-30

## 2020-09-30 DIAGNOSIS — Z00.00 ROUTINE GENERAL MEDICAL EXAMINATION AT A HEALTH CARE FACILITY: ICD-10-CM

## 2020-09-30 LAB
ALBUMIN SERPL-MCNC: 3.9 G/DL (ref 3.5–5.2)
ALBUMIN/GLOB SERPL: 1.5 G/DL
ALP SERPL-CCNC: 70 U/L (ref 39–117)
ALT SERPL W P-5'-P-CCNC: 21 U/L (ref 1–41)
ANION GAP SERPL CALCULATED.3IONS-SCNC: 8.3 MMOL/L (ref 5–15)
AST SERPL-CCNC: 21 U/L (ref 1–40)
BASOPHILS # BLD MANUAL: 0.15 10*3/MM3 (ref 0–0.2)
BASOPHILS NFR BLD AUTO: 2.1 % (ref 0–1.5)
BILIRUB SERPL-MCNC: 0.2 MG/DL (ref 0–1.2)
BUN SERPL-MCNC: 23 MG/DL (ref 8–23)
BUN/CREAT SERPL: 36.5 (ref 7–25)
CALCIUM SPEC-SCNC: 9.4 MG/DL (ref 8.6–10.5)
CHLORIDE SERPL-SCNC: 102 MMOL/L (ref 98–107)
CO2 SERPL-SCNC: 31.7 MMOL/L (ref 22–29)
CREAT SERPL-MCNC: 0.63 MG/DL (ref 0.76–1.27)
DEPRECATED RDW RBC AUTO: 37.3 FL (ref 37–54)
EOSINOPHIL # BLD MANUAL: 0.22 10*3/MM3 (ref 0–0.4)
EOSINOPHIL NFR BLD MANUAL: 3.1 % (ref 0.3–6.2)
ERYTHROCYTE [DISTWIDTH] IN BLOOD BY AUTOMATED COUNT: 12.9 % (ref 12.3–15.4)
GFR SERPL CREATININE-BSD FRML MDRD: 150 ML/MIN/1.73
GLOBULIN UR ELPH-MCNC: 2.6 GM/DL
GLUCOSE SERPL-MCNC: 126 MG/DL (ref 65–99)
HBA1C MFR BLD: 6.4 % (ref 4.8–5.6)
HCT VFR BLD AUTO: 41.1 % (ref 37.5–51)
HGB BLD-MCNC: 13.6 G/DL (ref 13–17.7)
LYMPHOCYTES # BLD MANUAL: 0.88 10*3/MM3 (ref 0.7–3.1)
LYMPHOCYTES NFR BLD MANUAL: 10.3 % (ref 5–12)
LYMPHOCYTES NFR BLD MANUAL: 12.4 % (ref 19.6–45.3)
MCH RBC QN AUTO: 26.7 PG (ref 26.6–33)
MCHC RBC AUTO-ENTMCNC: 33.1 G/DL (ref 31.5–35.7)
MCV RBC AUTO: 80.7 FL (ref 79–97)
MONOCYTES # BLD AUTO: 0.73 10*3/MM3 (ref 0.1–0.9)
NEUTROPHILS # BLD AUTO: 5.1 10*3/MM3 (ref 1.7–7)
NEUTROPHILS NFR BLD MANUAL: 72.2 % (ref 42.7–76)
PLAT MORPH BLD: NORMAL
PLATELET # BLD AUTO: 260 10*3/MM3 (ref 140–450)
PMV BLD AUTO: 12.3 FL (ref 6–12)
POTASSIUM SERPL-SCNC: 4.8 MMOL/L (ref 3.5–5.2)
PROT SERPL-MCNC: 6.5 G/DL (ref 6–8.5)
RBC # BLD AUTO: 5.09 10*6/MM3 (ref 4.14–5.8)
RBC MORPH BLD: NORMAL
SMUDGE CELLS BLD QL SMEAR: ABNORMAL
SODIUM SERPL-SCNC: 142 MMOL/L (ref 136–145)
WBC # BLD AUTO: 7.07 10*3/MM3 (ref 3.4–10.8)

## 2020-09-30 PROCEDURE — 85007 BL SMEAR W/DIFF WBC COUNT: CPT

## 2020-09-30 PROCEDURE — 83036 HEMOGLOBIN GLYCOSYLATED A1C: CPT

## 2020-09-30 PROCEDURE — 85027 COMPLETE CBC AUTOMATED: CPT

## 2020-09-30 PROCEDURE — 80053 COMPREHEN METABOLIC PANEL: CPT

## 2021-02-24 ENCOUNTER — HOSPITAL ENCOUNTER (INPATIENT)
Facility: HOSPITAL | Age: 76
LOS: 6 days | Discharge: NURSING, REHAB OR LONG-TERM CARE | End: 2021-03-02
Attending: EMERGENCY MEDICINE | Admitting: INTERNAL MEDICINE

## 2021-02-24 ENCOUNTER — APPOINTMENT (OUTPATIENT)
Dept: CT IMAGING | Facility: HOSPITAL | Age: 76
End: 2021-02-24

## 2021-02-24 ENCOUNTER — APPOINTMENT (OUTPATIENT)
Dept: GENERAL RADIOLOGY | Facility: HOSPITAL | Age: 76
End: 2021-02-24

## 2021-02-24 ENCOUNTER — APPOINTMENT (OUTPATIENT)
Dept: CARDIOLOGY | Facility: HOSPITAL | Age: 76
End: 2021-02-24

## 2021-02-24 DIAGNOSIS — D72.829 LEUKOCYTOSIS, UNSPECIFIED TYPE: ICD-10-CM

## 2021-02-24 DIAGNOSIS — A41.9 SEPSIS WITHOUT ACUTE ORGAN DYSFUNCTION, DUE TO UNSPECIFIED ORGANISM (HCC): Primary | ICD-10-CM

## 2021-02-24 DIAGNOSIS — A49.9 UTI (URINARY TRACT INFECTION), BACTERIAL: ICD-10-CM

## 2021-02-24 DIAGNOSIS — N39.0 UTI (URINARY TRACT INFECTION), BACTERIAL: ICD-10-CM

## 2021-02-24 DIAGNOSIS — R41.82 ALTERED MENTAL STATUS, UNSPECIFIED ALTERED MENTAL STATUS TYPE: ICD-10-CM

## 2021-02-24 PROBLEM — R82.90 ABNORMAL URINALYSIS: Status: ACTIVE | Noted: 2021-02-24

## 2021-02-24 PROBLEM — G93.41 METABOLIC ENCEPHALOPATHY: Status: ACTIVE | Noted: 2021-02-24

## 2021-02-24 PROBLEM — I69.351 HEMIPARESIS AFFECTING RIGHT SIDE AS LATE EFFECT OF CEREBROVASCULAR ACCIDENT: Status: ACTIVE | Noted: 2021-02-24

## 2021-02-24 PROBLEM — R09.89 PULMONARY VENOUS CONGESTION: Status: ACTIVE | Noted: 2021-02-24

## 2021-02-24 PROBLEM — I69.391 DYSPHAGIA DUE TO OLD STROKE: Status: ACTIVE | Noted: 2020-01-02

## 2021-02-24 PROBLEM — H51.8: Status: ACTIVE | Noted: 2021-02-24

## 2021-02-24 PROBLEM — E11.65 TYPE 2 DIABETES MELLITUS WITH HYPERGLYCEMIA (HCC): Status: ACTIVE | Noted: 2021-02-24

## 2021-02-24 PROBLEM — K21.9 GERD (GASTROESOPHAGEAL REFLUX DISEASE): Status: ACTIVE | Noted: 2021-02-24

## 2021-02-24 PROBLEM — R41.0 ACUTE CONFUSION: Status: ACTIVE | Noted: 2021-02-24

## 2021-02-24 LAB
ALBUMIN SERPL-MCNC: 3.4 G/DL (ref 3.5–5.2)
ALBUMIN SERPL-MCNC: 3.4 G/DL (ref 3.5–5.2)
ALBUMIN/GLOB SERPL: 1.1 G/DL
ALBUMIN/GLOB SERPL: 1.1 G/DL
ALP SERPL-CCNC: 57 U/L (ref 39–117)
ALP SERPL-CCNC: 57 U/L (ref 39–117)
ALT SERPL W P-5'-P-CCNC: 29 U/L (ref 1–41)
ALT SERPL W P-5'-P-CCNC: 32 U/L (ref 1–41)
AMMONIA BLD-SCNC: 52 UMOL/L (ref 16–60)
ANION GAP SERPL CALCULATED.3IONS-SCNC: 9.2 MMOL/L (ref 5–15)
ANION GAP SERPL CALCULATED.3IONS-SCNC: 9.5 MMOL/L (ref 5–15)
APTT PPP: 30.6 SECONDS (ref 22.7–35.4)
AST SERPL-CCNC: 22 U/L (ref 1–40)
AST SERPL-CCNC: 24 U/L (ref 1–40)
BACTERIA UR QL AUTO: ABNORMAL /HPF
BASOPHILS # BLD MANUAL: 0.36 10*3/MM3 (ref 0–0.2)
BASOPHILS NFR BLD AUTO: 1 % (ref 0–1.5)
BILIRUB SERPL-MCNC: 0.5 MG/DL (ref 0–1.2)
BILIRUB SERPL-MCNC: 0.6 MG/DL (ref 0–1.2)
BILIRUB UR QL STRIP: NEGATIVE
BUN SERPL-MCNC: 34 MG/DL (ref 8–23)
BUN SERPL-MCNC: 36 MG/DL (ref 8–23)
BUN/CREAT SERPL: 31.5 (ref 7–25)
BUN/CREAT SERPL: 37.9 (ref 7–25)
CALCIUM SPEC-SCNC: 9.2 MG/DL (ref 8.6–10.5)
CALCIUM SPEC-SCNC: 9.3 MG/DL (ref 8.6–10.5)
CHLORIDE SERPL-SCNC: 103 MMOL/L (ref 98–107)
CHLORIDE SERPL-SCNC: 104 MMOL/L (ref 98–107)
CHOLEST SERPL-MCNC: 80 MG/DL (ref 0–200)
CLARITY UR: ABNORMAL
CO2 SERPL-SCNC: 26.5 MMOL/L (ref 22–29)
CO2 SERPL-SCNC: 26.8 MMOL/L (ref 22–29)
COLOR UR: YELLOW
CREAT SERPL-MCNC: 0.95 MG/DL (ref 0.76–1.27)
CREAT SERPL-MCNC: 1.08 MG/DL (ref 0.76–1.27)
D-LACTATE SERPL-SCNC: 1.5 MMOL/L (ref 0.5–2)
DEPRECATED RDW RBC AUTO: 40.2 FL (ref 37–54)
DEPRECATED RDW RBC AUTO: 40.3 FL (ref 37–54)
ERYTHROCYTE [DISTWIDTH] IN BLOOD BY AUTOMATED COUNT: 13.7 % (ref 12.3–15.4)
ERYTHROCYTE [DISTWIDTH] IN BLOOD BY AUTOMATED COUNT: 13.7 % (ref 12.3–15.4)
FOLATE SERPL-MCNC: 13.5 NG/ML (ref 4.78–24.2)
GFR SERPL CREATININE-BSD FRML MDRD: 81 ML/MIN/1.73
GFR SERPL CREATININE-BSD FRML MDRD: 93 ML/MIN/1.73
GLOBULIN UR ELPH-MCNC: 3 GM/DL
GLOBULIN UR ELPH-MCNC: 3.1 GM/DL
GLUCOSE BLDC GLUCOMTR-MCNC: 147 MG/DL (ref 70–130)
GLUCOSE BLDC GLUCOMTR-MCNC: 190 MG/DL (ref 70–130)
GLUCOSE BLDC GLUCOMTR-MCNC: 208 MG/DL (ref 70–130)
GLUCOSE BLDC GLUCOMTR-MCNC: 78 MG/DL (ref 70–130)
GLUCOSE SERPL-MCNC: 213 MG/DL (ref 65–99)
GLUCOSE SERPL-MCNC: 226 MG/DL (ref 65–99)
GLUCOSE UR STRIP-MCNC: NEGATIVE MG/DL
HBA1C MFR BLD: 7.1 % (ref 4.8–5.6)
HCT VFR BLD AUTO: 41.4 % (ref 37.5–51)
HCT VFR BLD AUTO: 41.8 % (ref 37.5–51)
HDLC SERPL-MCNC: 51 MG/DL (ref 40–60)
HGB BLD-MCNC: 13.5 G/DL (ref 13–17.7)
HGB BLD-MCNC: 13.8 G/DL (ref 13–17.7)
HGB UR QL STRIP.AUTO: ABNORMAL
HYALINE CASTS UR QL AUTO: ABNORMAL /LPF
INR PPP: 1.35 (ref 0.9–1.1)
KETONES UR QL STRIP: NEGATIVE
LDLC SERPL CALC-MCNC: 15 MG/DL (ref 0–100)
LDLC/HDLC SERPL: 0.34 {RATIO}
LEUKOCYTE ESTERASE UR QL STRIP.AUTO: ABNORMAL
LYMPHOCYTES # BLD MANUAL: 0.71 10*3/MM3 (ref 0.7–3.1)
LYMPHOCYTES NFR BLD MANUAL: 2 % (ref 19.6–45.3)
LYMPHOCYTES NFR BLD MANUAL: 7 % (ref 5–12)
MCH RBC QN AUTO: 26.8 PG (ref 26.6–33)
MCH RBC QN AUTO: 27.3 PG (ref 26.6–33)
MCHC RBC AUTO-ENTMCNC: 32.6 G/DL (ref 31.5–35.7)
MCHC RBC AUTO-ENTMCNC: 33 G/DL (ref 31.5–35.7)
MCV RBC AUTO: 82.1 FL (ref 79–97)
MCV RBC AUTO: 82.8 FL (ref 79–97)
MONOCYTES # BLD AUTO: 2.49 10*3/MM3 (ref 0.1–0.9)
MRSA DNA SPEC QL NAA+PROBE: ABNORMAL
NEUTROPHILS # BLD AUTO: 32.05 10*3/MM3 (ref 1.7–7)
NEUTROPHILS NFR BLD MANUAL: 90 % (ref 42.7–76)
NITRITE UR QL STRIP: NEGATIVE
NT-PROBNP SERPL-MCNC: 1998 PG/ML (ref 0–1800)
PH UR STRIP.AUTO: >=9 [PH] (ref 5–8)
PLAT MORPH BLD: NORMAL
PLATELET # BLD AUTO: 247 10*3/MM3 (ref 140–450)
PLATELET # BLD AUTO: 266 10*3/MM3 (ref 140–450)
PMV BLD AUTO: 12.4 FL (ref 6–12)
PMV BLD AUTO: 12.6 FL (ref 6–12)
POTASSIUM SERPL-SCNC: 4.9 MMOL/L (ref 3.5–5.2)
POTASSIUM SERPL-SCNC: 5.2 MMOL/L (ref 3.5–5.2)
PROCALCITONIN SERPL-MCNC: 22.32 NG/ML (ref 0–0.25)
PROT SERPL-MCNC: 6.4 G/DL (ref 6–8.5)
PROT SERPL-MCNC: 6.5 G/DL (ref 6–8.5)
PROT UR QL STRIP: ABNORMAL
PROTHROMBIN TIME: 16.5 SECONDS (ref 11.7–14.2)
QT INTERVAL: 336 MS
RBC # BLD AUTO: 5.04 10*6/MM3 (ref 4.14–5.8)
RBC # BLD AUTO: 5.05 10*6/MM3 (ref 4.14–5.8)
RBC # UR: ABNORMAL /HPF
RBC MORPH BLD: NORMAL
REF LAB TEST METHOD: ABNORMAL
SARS-COV-2 ORF1AB RESP QL NAA+PROBE: NOT DETECTED
SODIUM SERPL-SCNC: 139 MMOL/L (ref 136–145)
SODIUM SERPL-SCNC: 140 MMOL/L (ref 136–145)
SP GR UR STRIP: 1.02 (ref 1–1.03)
SQUAMOUS #/AREA URNS HPF: ABNORMAL /HPF
TRI-PHOS CRY URNS QL MICRO: ABNORMAL /HPF
TRIGL SERPL-MCNC: 58 MG/DL (ref 0–150)
TROPONIN T SERPL-MCNC: 0.01 NG/ML (ref 0–0.03)
TSH SERPL DL<=0.05 MIU/L-ACNC: 0.48 UIU/ML (ref 0.27–4.2)
UROBILINOGEN UR QL STRIP: ABNORMAL
VIT B12 BLD-MCNC: 858 PG/ML (ref 211–946)
VLDLC SERPL-MCNC: 14 MG/DL (ref 5–40)
WBC # BLD AUTO: 32.83 10*3/MM3 (ref 3.4–10.8)
WBC # BLD AUTO: 35.61 10*3/MM3 (ref 3.4–10.8)
WBC MORPH BLD: NORMAL
WBC UR QL AUTO: ABNORMAL /HPF

## 2021-02-24 PROCEDURE — 84484 ASSAY OF TROPONIN QUANT: CPT | Performed by: EMERGENCY MEDICINE

## 2021-02-24 PROCEDURE — 25010000003 CEFTRIAXONE PER 250 MG: Performed by: INTERNAL MEDICINE

## 2021-02-24 PROCEDURE — 71250 CT THORAX DX C-: CPT

## 2021-02-24 PROCEDURE — 84443 ASSAY THYROID STIM HORMONE: CPT | Performed by: NURSE PRACTITIONER

## 2021-02-24 PROCEDURE — 87077 CULTURE AEROBIC IDENTIFY: CPT | Performed by: EMERGENCY MEDICINE

## 2021-02-24 PROCEDURE — 85007 BL SMEAR W/DIFF WBC COUNT: CPT | Performed by: EMERGENCY MEDICINE

## 2021-02-24 PROCEDURE — 80053 COMPREHEN METABOLIC PANEL: CPT | Performed by: NURSE PRACTITIONER

## 2021-02-24 PROCEDURE — 83605 ASSAY OF LACTIC ACID: CPT | Performed by: EMERGENCY MEDICINE

## 2021-02-24 PROCEDURE — 87641 MR-STAPH DNA AMP PROBE: CPT | Performed by: INTERNAL MEDICINE

## 2021-02-24 PROCEDURE — 82962 GLUCOSE BLOOD TEST: CPT

## 2021-02-24 PROCEDURE — 63710000001 INSULIN REGULAR HUMAN PER 5 UNITS: Performed by: INTERNAL MEDICINE

## 2021-02-24 PROCEDURE — 93010 ELECTROCARDIOGRAM REPORT: CPT | Performed by: INTERNAL MEDICINE

## 2021-02-24 PROCEDURE — 83880 ASSAY OF NATRIURETIC PEPTIDE: CPT | Performed by: INTERNAL MEDICINE

## 2021-02-24 PROCEDURE — U0004 COV-19 TEST NON-CDC HGH THRU: HCPCS | Performed by: EMERGENCY MEDICINE

## 2021-02-24 PROCEDURE — 93306 TTE W/DOPPLER COMPLETE: CPT

## 2021-02-24 PROCEDURE — 87150 DNA/RNA AMPLIFIED PROBE: CPT | Performed by: EMERGENCY MEDICINE

## 2021-02-24 PROCEDURE — 93306 TTE W/DOPPLER COMPLETE: CPT | Performed by: INTERNAL MEDICINE

## 2021-02-24 PROCEDURE — 99223 1ST HOSP IP/OBS HIGH 75: CPT | Performed by: INTERNAL MEDICINE

## 2021-02-24 PROCEDURE — 80061 LIPID PANEL: CPT | Performed by: NURSE PRACTITIONER

## 2021-02-24 PROCEDURE — 71045 X-RAY EXAM CHEST 1 VIEW: CPT

## 2021-02-24 PROCEDURE — 70496 CT ANGIOGRAPHY HEAD: CPT

## 2021-02-24 PROCEDURE — P9612 CATHETERIZE FOR URINE SPEC: HCPCS

## 2021-02-24 PROCEDURE — 83036 HEMOGLOBIN GLYCOSYLATED A1C: CPT | Performed by: NURSE PRACTITIONER

## 2021-02-24 PROCEDURE — 85025 COMPLETE CBC W/AUTO DIFF WBC: CPT | Performed by: EMERGENCY MEDICINE

## 2021-02-24 PROCEDURE — 85027 COMPLETE CBC AUTOMATED: CPT | Performed by: NURSE PRACTITIONER

## 2021-02-24 PROCEDURE — 25010000002 VANCOMYCIN PER 500 MG: Performed by: INTERNAL MEDICINE

## 2021-02-24 PROCEDURE — 93005 ELECTROCARDIOGRAM TRACING: CPT | Performed by: EMERGENCY MEDICINE

## 2021-02-24 PROCEDURE — 87186 SC STD MICRODIL/AGAR DIL: CPT | Performed by: EMERGENCY MEDICINE

## 2021-02-24 PROCEDURE — 87086 URINE CULTURE/COLONY COUNT: CPT | Performed by: INTERNAL MEDICINE

## 2021-02-24 PROCEDURE — 80053 COMPREHEN METABOLIC PANEL: CPT | Performed by: EMERGENCY MEDICINE

## 2021-02-24 PROCEDURE — 84145 PROCALCITONIN (PCT): CPT | Performed by: INTERNAL MEDICINE

## 2021-02-24 PROCEDURE — 70498 CT ANGIOGRAPHY NECK: CPT

## 2021-02-24 PROCEDURE — 87181 SC STD AGAR DILUTION PER AGT: CPT | Performed by: EMERGENCY MEDICINE

## 2021-02-24 PROCEDURE — 25010000003 CEFTRIAXONE PER 250 MG: Performed by: EMERGENCY MEDICINE

## 2021-02-24 PROCEDURE — 85610 PROTHROMBIN TIME: CPT | Performed by: EMERGENCY MEDICINE

## 2021-02-24 PROCEDURE — 99222 1ST HOSP IP/OBS MODERATE 55: CPT | Performed by: PSYCHIATRY & NEUROLOGY

## 2021-02-24 PROCEDURE — 82746 ASSAY OF FOLIC ACID SERUM: CPT | Performed by: PSYCHIATRY & NEUROLOGY

## 2021-02-24 PROCEDURE — 70450 CT HEAD/BRAIN W/O DYE: CPT

## 2021-02-24 PROCEDURE — 82607 VITAMIN B-12: CPT | Performed by: PSYCHIATRY & NEUROLOGY

## 2021-02-24 PROCEDURE — 82140 ASSAY OF AMMONIA: CPT | Performed by: PSYCHIATRY & NEUROLOGY

## 2021-02-24 PROCEDURE — 99285 EMERGENCY DEPT VISIT HI MDM: CPT

## 2021-02-24 PROCEDURE — 85730 THROMBOPLASTIN TIME PARTIAL: CPT | Performed by: EMERGENCY MEDICINE

## 2021-02-24 PROCEDURE — 87040 BLOOD CULTURE FOR BACTERIA: CPT | Performed by: EMERGENCY MEDICINE

## 2021-02-24 PROCEDURE — 81001 URINALYSIS AUTO W/SCOPE: CPT | Performed by: EMERGENCY MEDICINE

## 2021-02-24 PROCEDURE — 0 IOPAMIDOL PER 1 ML: Performed by: INTERNAL MEDICINE

## 2021-02-24 RX ORDER — BISACODYL 10 MG
10 SUPPOSITORY, RECTAL RECTAL DAILY PRN
Status: DISCONTINUED | OUTPATIENT
Start: 2021-02-24 | End: 2021-03-02 | Stop reason: HOSPADM

## 2021-02-24 RX ORDER — CEFTRIAXONE SODIUM 2 G/50ML
2 INJECTION, SOLUTION INTRAVENOUS EVERY 12 HOURS
Status: DISCONTINUED | OUTPATIENT
Start: 2021-02-24 | End: 2021-02-25

## 2021-02-24 RX ORDER — SODIUM CHLORIDE 9 MG/ML
100 INJECTION, SOLUTION INTRAVENOUS CONTINUOUS
Status: DISCONTINUED | OUTPATIENT
Start: 2021-02-24 | End: 2021-02-25

## 2021-02-24 RX ORDER — ASPIRIN 325 MG
325 TABLET ORAL DAILY
Status: DISCONTINUED | OUTPATIENT
Start: 2021-02-24 | End: 2021-03-02 | Stop reason: HOSPADM

## 2021-02-24 RX ORDER — ASPIRIN 300 MG/1
300 SUPPOSITORY RECTAL DAILY
Status: DISCONTINUED | OUTPATIENT
Start: 2021-02-24 | End: 2021-03-02 | Stop reason: HOSPADM

## 2021-02-24 RX ORDER — SODIUM CHLORIDE 0.9 % (FLUSH) 0.9 %
10 SYRINGE (ML) INJECTION AS NEEDED
Status: DISCONTINUED | OUTPATIENT
Start: 2021-02-24 | End: 2021-03-02 | Stop reason: HOSPADM

## 2021-02-24 RX ORDER — ATORVASTATIN CALCIUM 80 MG/1
80 TABLET, FILM COATED ORAL NIGHTLY
Status: DISCONTINUED | OUTPATIENT
Start: 2021-02-24 | End: 2021-02-24

## 2021-02-24 RX ORDER — CEFTRIAXONE SODIUM 2 G/50ML
2 INJECTION, SOLUTION INTRAVENOUS ONCE
Status: COMPLETED | OUTPATIENT
Start: 2021-02-24 | End: 2021-02-24

## 2021-02-24 RX ORDER — TRAMADOL HYDROCHLORIDE 50 MG/1
50 TABLET ORAL 3 TIMES DAILY PRN
COMMUNITY
End: 2021-03-02 | Stop reason: HOSPADM

## 2021-02-24 RX ORDER — BUSPIRONE HYDROCHLORIDE 7.5 MG/1
7.5 TABLET ORAL 3 TIMES DAILY
COMMUNITY
End: 2021-03-02 | Stop reason: HOSPADM

## 2021-02-24 RX ORDER — ACETAMINOPHEN 325 MG/1
650 TABLET ORAL EVERY 4 HOURS PRN
Status: DISCONTINUED | OUTPATIENT
Start: 2021-02-24 | End: 2021-03-02 | Stop reason: HOSPADM

## 2021-02-24 RX ORDER — NICOTINE POLACRILEX 4 MG
15 LOZENGE BUCCAL
Status: DISCONTINUED | OUTPATIENT
Start: 2021-02-24 | End: 2021-03-02 | Stop reason: HOSPADM

## 2021-02-24 RX ORDER — VANCOMYCIN HYDROCHLORIDE 1 G/200ML
20 INJECTION, SOLUTION INTRAVENOUS ONCE
Status: COMPLETED | OUTPATIENT
Start: 2021-02-24 | End: 2021-02-24

## 2021-02-24 RX ORDER — CHLORHEXIDINE GLUCONATE 0.12 MG/ML
15 RINSE ORAL 2 TIMES DAILY
COMMUNITY
End: 2021-03-02 | Stop reason: HOSPADM

## 2021-02-24 RX ORDER — UREA 10 %
3 LOTION (ML) TOPICAL
Status: DISCONTINUED | OUTPATIENT
Start: 2021-02-24 | End: 2021-03-02 | Stop reason: HOSPADM

## 2021-02-24 RX ORDER — LORAZEPAM 2 MG/ML
0.5 INJECTION INTRAMUSCULAR ONCE
Status: COMPLETED | OUTPATIENT
Start: 2021-02-24 | End: 2021-02-25

## 2021-02-24 RX ORDER — ATORVASTATIN CALCIUM 80 MG/1
80 TABLET, FILM COATED ORAL NIGHTLY
Status: DISCONTINUED | OUTPATIENT
Start: 2021-02-24 | End: 2021-02-27

## 2021-02-24 RX ORDER — ACETAMINOPHEN 650 MG/1
650 SUPPOSITORY RECTAL EVERY 4 HOURS PRN
Status: DISCONTINUED | OUTPATIENT
Start: 2021-02-24 | End: 2021-03-02 | Stop reason: HOSPADM

## 2021-02-24 RX ORDER — BUSPIRONE HYDROCHLORIDE 5 MG/1
5 TABLET ORAL 3 TIMES DAILY
COMMUNITY
End: 2021-03-02 | Stop reason: HOSPADM

## 2021-02-24 RX ORDER — LANSOPRAZOLE
30 KIT EVERY MORNING
Status: DISCONTINUED | OUTPATIENT
Start: 2021-02-24 | End: 2021-03-02 | Stop reason: HOSPADM

## 2021-02-24 RX ORDER — DEXTROSE MONOHYDRATE 25 G/50ML
25 INJECTION, SOLUTION INTRAVENOUS
Status: DISCONTINUED | OUTPATIENT
Start: 2021-02-24 | End: 2021-03-02 | Stop reason: HOSPADM

## 2021-02-24 RX ORDER — ONDANSETRON 2 MG/ML
4 INJECTION INTRAMUSCULAR; INTRAVENOUS EVERY 6 HOURS PRN
Status: DISCONTINUED | OUTPATIENT
Start: 2021-02-24 | End: 2021-03-02 | Stop reason: HOSPADM

## 2021-02-24 RX ADMIN — SODIUM CHLORIDE 100 ML/HR: 9 INJECTION, SOLUTION INTRAVENOUS at 16:36

## 2021-02-24 RX ADMIN — SODIUM CHLORIDE 75 ML/HR: 9 INJECTION, SOLUTION INTRAVENOUS at 06:50

## 2021-02-24 RX ADMIN — IOPAMIDOL 95 ML: 755 INJECTION, SOLUTION INTRAVENOUS at 14:19

## 2021-02-24 RX ADMIN — CEFTRIAXONE SODIUM 2 G: 2 INJECTION, SOLUTION INTRAVENOUS at 22:21

## 2021-02-24 RX ADMIN — ATORVASTATIN CALCIUM 80 MG: 80 TABLET, FILM COATED ORAL at 22:20

## 2021-02-24 RX ADMIN — VANCOMYCIN HYDROCHLORIDE 1000 MG: 1 INJECTION, SOLUTION INTRAVENOUS at 11:27

## 2021-02-24 RX ADMIN — CEFTRIAXONE SODIUM 2 G: 2 INJECTION, SOLUTION INTRAVENOUS at 06:27

## 2021-02-24 RX ADMIN — INSULIN HUMAN 2 UNITS: 100 INJECTION, SOLUTION PARENTERAL at 14:06

## 2021-02-24 RX ADMIN — Medication 3 MG: at 22:20

## 2021-02-24 RX ADMIN — LANSOPRAZOLE 30 MG: KIT at 14:58

## 2021-02-25 ENCOUNTER — APPOINTMENT (OUTPATIENT)
Dept: GENERAL RADIOLOGY | Facility: HOSPITAL | Age: 76
End: 2021-02-25

## 2021-02-25 ENCOUNTER — ANESTHESIA (OUTPATIENT)
Dept: PERIOP | Facility: HOSPITAL | Age: 76
End: 2021-02-25

## 2021-02-25 ENCOUNTER — APPOINTMENT (OUTPATIENT)
Dept: MRI IMAGING | Facility: HOSPITAL | Age: 76
End: 2021-02-25

## 2021-02-25 ENCOUNTER — ANESTHESIA EVENT (OUTPATIENT)
Dept: PERIOP | Facility: HOSPITAL | Age: 76
End: 2021-02-25

## 2021-02-25 ENCOUNTER — APPOINTMENT (OUTPATIENT)
Dept: CT IMAGING | Facility: HOSPITAL | Age: 76
End: 2021-02-25

## 2021-02-25 ENCOUNTER — APPOINTMENT (OUTPATIENT)
Dept: ULTRASOUND IMAGING | Facility: HOSPITAL | Age: 76
End: 2021-02-25

## 2021-02-25 PROBLEM — N20.1 LEFT URETERAL STONE: Status: ACTIVE | Noted: 2021-02-25

## 2021-02-25 PROBLEM — B96.20 E COLI BACTEREMIA: Status: ACTIVE | Noted: 2021-02-25

## 2021-02-25 PROBLEM — R78.81 E COLI BACTEREMIA: Status: ACTIVE | Noted: 2021-02-25

## 2021-02-25 LAB
ANION GAP SERPL CALCULATED.3IONS-SCNC: 10.3 MMOL/L (ref 5–15)
AORTIC DIMENSIONLESS INDEX: 0.9 (DI)
ARTERIAL PATENCY WRIST A: POSITIVE
ATMOSPHERIC PRESS: 757.4 MMHG
BACTERIA SPEC AEROBE CULT: NORMAL
BASE EXCESS BLDA CALC-SCNC: -4.2 MMOL/L (ref 0–2)
BDY SITE: ABNORMAL
BH CV ECHO MEAS - AO MAX PG (FULL): 1.9 MMHG
BH CV ECHO MEAS - AO MAX PG: 4.8 MMHG
BH CV ECHO MEAS - AO MEAN PG (FULL): 1 MMHG
BH CV ECHO MEAS - AO MEAN PG: 3 MMHG
BH CV ECHO MEAS - AO ROOT AREA (BSA CORRECTED): 2.6
BH CV ECHO MEAS - AO ROOT AREA: 11.3 CM^2
BH CV ECHO MEAS - AO ROOT DIAM: 3.8 CM
BH CV ECHO MEAS - AO V2 MAX: 109 CM/SEC
BH CV ECHO MEAS - AO V2 MEAN: 83.7 CM/SEC
BH CV ECHO MEAS - AO V2 VTI: 14.3 CM
BH CV ECHO MEAS - AVA(I,A): 3.1 CM^2
BH CV ECHO MEAS - AVA(I,D): 3.1 CM^2
BH CV ECHO MEAS - AVA(V,A): 2.7 CM^2
BH CV ECHO MEAS - AVA(V,D): 2.7 CM^2
BH CV ECHO MEAS - BSA(HAYCOCK): 1.4 M^2
BH CV ECHO MEAS - BSA: 1.5 M^2
BH CV ECHO MEAS - BZI_BMI: 15.2 KILOGRAMS/M^2
BH CV ECHO MEAS - BZI_METRIC_HEIGHT: 170.2 CM
BH CV ECHO MEAS - BZI_METRIC_WEIGHT: 44 KG
BH CV ECHO MEAS - EDV(MOD-SP4): 66 ML
BH CV ECHO MEAS - EF(MOD-SP4): 47 %
BH CV ECHO MEAS - ESV(MOD-SP4): 35 ML
BH CV ECHO MEAS - LV DIASTOLIC VOL/BSA (35-75): 44.4 ML/M^2
BH CV ECHO MEAS - LV MAX PG: 2.8 MMHG
BH CV ECHO MEAS - LV MEAN PG: 2 MMHG
BH CV ECHO MEAS - LV SYSTOLIC VOL/BSA (12-30): 23.5 ML/M^2
BH CV ECHO MEAS - LV V1 MAX: 84.2 CM/SEC
BH CV ECHO MEAS - LV V1 MEAN: 56.8 CM/SEC
BH CV ECHO MEAS - LV V1 VTI: 12.6 CM
BH CV ECHO MEAS - LVLD AP4: 7.1 CM
BH CV ECHO MEAS - LVLS AP4: 6.9 CM
BH CV ECHO MEAS - LVOT AREA (M): 3.5 CM^2
BH CV ECHO MEAS - LVOT AREA: 3.5 CM^2
BH CV ECHO MEAS - LVOT DIAM: 2.1 CM
BH CV ECHO MEAS - RAP SYSTOLE: 3 MMHG
BH CV ECHO MEAS - SI(AO): 109.1 ML/M^2
BH CV ECHO MEAS - SI(LVOT): 29.4 ML/M^2
BH CV ECHO MEAS - SI(MOD-SP4): 20.9 ML/M^2
BH CV ECHO MEAS - SV(AO): 162.2 ML
BH CV ECHO MEAS - SV(LVOT): 43.6 ML
BH CV ECHO MEAS - SV(MOD-SP4): 31 ML
BH CV ECHO MEAS - TAPSE (>1.6): 1.7 CM
BH CV XLRA - RV BASE: 3.5 CM
BUN SERPL-MCNC: 38 MG/DL (ref 8–23)
BUN/CREAT SERPL: 29.5 (ref 7–25)
CALCIUM SPEC-SCNC: 8.7 MG/DL (ref 8.6–10.5)
CHLORIDE SERPL-SCNC: 108 MMOL/L (ref 98–107)
CO2 SERPL-SCNC: 22.7 MMOL/L (ref 22–29)
CREAT SERPL-MCNC: 1.29 MG/DL (ref 0.76–1.27)
D-LACTATE SERPL-SCNC: 2.5 MMOL/L (ref 0.5–2)
DEPRECATED RDW RBC AUTO: 38.6 FL (ref 37–54)
ERYTHROCYTE [DISTWIDTH] IN BLOOD BY AUTOMATED COUNT: 13.3 % (ref 12.3–15.4)
GAS FLOW AIRWAY: 2 LPM
GFR SERPL CREATININE-BSD FRML MDRD: 66 ML/MIN/1.73
GLUCOSE BLDC GLUCOMTR-MCNC: 109 MG/DL (ref 70–130)
GLUCOSE BLDC GLUCOMTR-MCNC: 111 MG/DL (ref 70–130)
GLUCOSE BLDC GLUCOMTR-MCNC: 115 MG/DL (ref 70–130)
GLUCOSE BLDC GLUCOMTR-MCNC: 127 MG/DL (ref 70–130)
GLUCOSE BLDC GLUCOMTR-MCNC: 145 MG/DL (ref 70–130)
GLUCOSE BLDC GLUCOMTR-MCNC: 209 MG/DL (ref 70–130)
GLUCOSE BLDC GLUCOMTR-MCNC: 67 MG/DL (ref 70–130)
GLUCOSE BLDC GLUCOMTR-MCNC: 70 MG/DL (ref 70–130)
GLUCOSE BLDC GLUCOMTR-MCNC: 93 MG/DL (ref 70–130)
GLUCOSE SERPL-MCNC: 176 MG/DL (ref 65–99)
HCO3 BLDA-SCNC: 18.4 MMOL/L (ref 22–28)
HCT VFR BLD AUTO: 34.5 % (ref 37.5–51)
HGB BLD-MCNC: 11.5 G/DL (ref 13–17.7)
MCH RBC QN AUTO: 27.1 PG (ref 26.6–33)
MCHC RBC AUTO-ENTMCNC: 33.3 G/DL (ref 31.5–35.7)
MCV RBC AUTO: 81.4 FL (ref 79–97)
MODALITY: ABNORMAL
PCO2 BLDA: 26.5 MM HG (ref 35–45)
PH BLDA: 7.45 PH UNITS (ref 7.35–7.45)
PLATELET # BLD AUTO: 179 10*3/MM3 (ref 140–450)
PMV BLD AUTO: 13.7 FL (ref 6–12)
PO2 BLDA: 61.1 MM HG (ref 80–100)
POTASSIUM SERPL-SCNC: 4.5 MMOL/L (ref 3.5–5.2)
RBC # BLD AUTO: 4.24 10*6/MM3 (ref 4.14–5.8)
SAO2 % BLDCOA: 92.6 % (ref 92–99)
SODIUM SERPL-SCNC: 141 MMOL/L (ref 136–145)
TOTAL RATE: 26 BREATHS/MINUTE
VANCOMYCIN SERPL-MCNC: 7.7 MCG/ML (ref 5–40)
WBC # BLD AUTO: 36.99 10*3/MM3 (ref 3.4–10.8)

## 2021-02-25 PROCEDURE — 82803 BLOOD GASES ANY COMBINATION: CPT

## 2021-02-25 PROCEDURE — C2617 STENT, NON-COR, TEM W/O DEL: HCPCS | Performed by: UROLOGY

## 2021-02-25 PROCEDURE — 0T778DZ DILATION OF LEFT URETER WITH INTRALUMINAL DEVICE, VIA NATURAL OR ARTIFICIAL OPENING ENDOSCOPIC: ICD-10-PCS | Performed by: UROLOGY

## 2021-02-25 PROCEDURE — 74176 CT ABD & PELVIS W/O CONTRAST: CPT

## 2021-02-25 PROCEDURE — 25010000002 SUCCINYLCHOLINE PER 20 MG: Performed by: NURSE ANESTHETIST, CERTIFIED REGISTERED

## 2021-02-25 PROCEDURE — BT1F1ZZ FLUOROSCOPY OF LEFT KIDNEY, URETER AND BLADDER USING LOW OSMOLAR CONTRAST: ICD-10-PCS | Performed by: UROLOGY

## 2021-02-25 PROCEDURE — C1769 GUIDE WIRE: HCPCS | Performed by: UROLOGY

## 2021-02-25 PROCEDURE — 76775 US EXAM ABDO BACK WALL LIM: CPT

## 2021-02-25 PROCEDURE — 25010000002 PROPOFOL 10 MG/ML EMULSION: Performed by: NURSE ANESTHETIST, CERTIFIED REGISTERED

## 2021-02-25 PROCEDURE — 94799 UNLISTED PULMONARY SVC/PX: CPT

## 2021-02-25 PROCEDURE — 74420 UROGRAPHY RTRGR +-KUB: CPT

## 2021-02-25 PROCEDURE — 63710000001 INSULIN REGULAR HUMAN PER 5 UNITS: Performed by: INTERNAL MEDICINE

## 2021-02-25 PROCEDURE — 82962 GLUCOSE BLOOD TEST: CPT

## 2021-02-25 PROCEDURE — 25010000002 LORAZEPAM PER 2 MG: Performed by: PSYCHIATRY & NEUROLOGY

## 2021-02-25 PROCEDURE — 36600 WITHDRAWAL OF ARTERIAL BLOOD: CPT

## 2021-02-25 PROCEDURE — 80048 BASIC METABOLIC PNL TOTAL CA: CPT | Performed by: INTERNAL MEDICINE

## 2021-02-25 PROCEDURE — 80202 ASSAY OF VANCOMYCIN: CPT | Performed by: INTERNAL MEDICINE

## 2021-02-25 PROCEDURE — 25010000002 MORPHINE PER 10 MG: Performed by: INTERNAL MEDICINE

## 2021-02-25 PROCEDURE — 83605 ASSAY OF LACTIC ACID: CPT | Performed by: INTERNAL MEDICINE

## 2021-02-25 PROCEDURE — 25010000003 CEFTRIAXONE PER 250 MG: Performed by: INTERNAL MEDICINE

## 2021-02-25 PROCEDURE — 99232 SBSQ HOSP IP/OBS MODERATE 35: CPT | Performed by: NURSE PRACTITIONER

## 2021-02-25 PROCEDURE — 94640 AIRWAY INHALATION TREATMENT: CPT

## 2021-02-25 PROCEDURE — 71045 X-RAY EXAM CHEST 1 VIEW: CPT

## 2021-02-25 PROCEDURE — 25010000002 ERTAPENEM PER 500 MG: Performed by: INTERNAL MEDICINE

## 2021-02-25 PROCEDURE — 25010000002 PHENYLEPHRINE PER 1 ML: Performed by: NURSE ANESTHETIST, CERTIFIED REGISTERED

## 2021-02-25 PROCEDURE — 99233 SBSQ HOSP IP/OBS HIGH 50: CPT | Performed by: INTERNAL MEDICINE

## 2021-02-25 PROCEDURE — 0 IOTHALAMATE 60 % SOLUTION: Performed by: UROLOGY

## 2021-02-25 PROCEDURE — C1758 CATHETER, URETERAL: HCPCS | Performed by: UROLOGY

## 2021-02-25 PROCEDURE — 85027 COMPLETE CBC AUTOMATED: CPT | Performed by: INTERNAL MEDICINE

## 2021-02-25 DEVICE — URETERAL STENT
Type: IMPLANTABLE DEVICE | Site: URETER | Status: FUNCTIONAL
Brand: CONTOUR™

## 2021-02-25 RX ORDER — DIPHENHYDRAMINE HYDROCHLORIDE 50 MG/ML
12.5 INJECTION INTRAMUSCULAR; INTRAVENOUS
Status: DISCONTINUED | OUTPATIENT
Start: 2021-02-25 | End: 2021-02-25 | Stop reason: HOSPADM

## 2021-02-25 RX ORDER — OXYCODONE AND ACETAMINOPHEN 7.5; 325 MG/1; MG/1
1 TABLET ORAL ONCE AS NEEDED
Status: DISCONTINUED | OUTPATIENT
Start: 2021-02-25 | End: 2021-02-25 | Stop reason: HOSPADM

## 2021-02-25 RX ORDER — IPRATROPIUM BROMIDE AND ALBUTEROL SULFATE 2.5; .5 MG/3ML; MG/3ML
3 SOLUTION RESPIRATORY (INHALATION) EVERY 4 HOURS PRN
Status: DISCONTINUED | OUTPATIENT
Start: 2021-02-25 | End: 2021-03-02 | Stop reason: HOSPADM

## 2021-02-25 RX ORDER — HYDROCODONE BITARTRATE AND ACETAMINOPHEN 7.5; 325 MG/1; MG/1
1 TABLET ORAL ONCE AS NEEDED
Status: DISCONTINUED | OUTPATIENT
Start: 2021-02-25 | End: 2021-02-25 | Stop reason: HOSPADM

## 2021-02-25 RX ORDER — ONDANSETRON 2 MG/ML
4 INJECTION INTRAMUSCULAR; INTRAVENOUS ONCE AS NEEDED
Status: DISCONTINUED | OUTPATIENT
Start: 2021-02-25 | End: 2021-02-25 | Stop reason: HOSPADM

## 2021-02-25 RX ORDER — MAGNESIUM HYDROXIDE 1200 MG/15ML
LIQUID ORAL AS NEEDED
Status: DISCONTINUED | OUTPATIENT
Start: 2021-02-25 | End: 2021-02-25 | Stop reason: HOSPADM

## 2021-02-25 RX ORDER — PROPOFOL 10 MG/ML
VIAL (ML) INTRAVENOUS AS NEEDED
Status: DISCONTINUED | OUTPATIENT
Start: 2021-02-25 | End: 2021-02-25 | Stop reason: SURG

## 2021-02-25 RX ORDER — SODIUM CHLORIDE, SODIUM LACTATE, POTASSIUM CHLORIDE, CALCIUM CHLORIDE 600; 310; 30; 20 MG/100ML; MG/100ML; MG/100ML; MG/100ML
9 INJECTION, SOLUTION INTRAVENOUS CONTINUOUS
Status: DISCONTINUED | OUTPATIENT
Start: 2021-02-25 | End: 2021-02-27

## 2021-02-25 RX ORDER — FENTANYL CITRATE 50 UG/ML
50 INJECTION, SOLUTION INTRAMUSCULAR; INTRAVENOUS
Status: DISCONTINUED | OUTPATIENT
Start: 2021-02-25 | End: 2021-02-25 | Stop reason: HOSPADM

## 2021-02-25 RX ORDER — LIDOCAINE HYDROCHLORIDE 20 MG/ML
INJECTION, SOLUTION INFILTRATION; PERINEURAL AS NEEDED
Status: DISCONTINUED | OUTPATIENT
Start: 2021-02-25 | End: 2021-02-25 | Stop reason: SURG

## 2021-02-25 RX ORDER — LORAZEPAM 2 MG/ML
1 INJECTION INTRAMUSCULAR ONCE AS NEEDED
Status: DISCONTINUED | OUTPATIENT
Start: 2021-02-25 | End: 2021-02-27

## 2021-02-25 RX ORDER — HYDROMORPHONE HYDROCHLORIDE 1 MG/ML
0.5 INJECTION, SOLUTION INTRAMUSCULAR; INTRAVENOUS; SUBCUTANEOUS
Status: DISCONTINUED | OUTPATIENT
Start: 2021-02-25 | End: 2021-02-25 | Stop reason: HOSPADM

## 2021-02-25 RX ORDER — FLUMAZENIL 0.1 MG/ML
0.2 INJECTION INTRAVENOUS AS NEEDED
Status: DISCONTINUED | OUTPATIENT
Start: 2021-02-25 | End: 2021-02-25 | Stop reason: HOSPADM

## 2021-02-25 RX ORDER — LABETALOL HYDROCHLORIDE 5 MG/ML
5 INJECTION, SOLUTION INTRAVENOUS
Status: DISCONTINUED | OUTPATIENT
Start: 2021-02-25 | End: 2021-02-25 | Stop reason: HOSPADM

## 2021-02-25 RX ORDER — DEXTROSE MONOHYDRATE 25 G/50ML
25 INJECTION, SOLUTION INTRAVENOUS
Status: DISCONTINUED | OUTPATIENT
Start: 2021-02-25 | End: 2021-03-02 | Stop reason: HOSPADM

## 2021-02-25 RX ORDER — DIPHENHYDRAMINE HCL 25 MG
25 CAPSULE ORAL
Status: DISCONTINUED | OUTPATIENT
Start: 2021-02-25 | End: 2021-02-25 | Stop reason: HOSPADM

## 2021-02-25 RX ORDER — SODIUM CHLORIDE 9 MG/ML
100 INJECTION, SOLUTION INTRAVENOUS CONTINUOUS
Status: DISCONTINUED | OUTPATIENT
Start: 2021-02-25 | End: 2021-02-26

## 2021-02-25 RX ORDER — SODIUM CHLORIDE 0.9 % (FLUSH) 0.9 %
3 SYRINGE (ML) INJECTION EVERY 12 HOURS SCHEDULED
Status: DISCONTINUED | OUTPATIENT
Start: 2021-02-25 | End: 2021-02-25 | Stop reason: HOSPADM

## 2021-02-25 RX ORDER — EPHEDRINE SULFATE 50 MG/ML
5 INJECTION, SOLUTION INTRAVENOUS ONCE AS NEEDED
Status: DISCONTINUED | OUTPATIENT
Start: 2021-02-25 | End: 2021-02-25 | Stop reason: HOSPADM

## 2021-02-25 RX ORDER — CALCIUM CHLORIDE 100 MG/ML
INJECTION INTRAVENOUS; INTRAVENTRICULAR AS NEEDED
Status: DISCONTINUED | OUTPATIENT
Start: 2021-02-25 | End: 2021-02-25 | Stop reason: SURG

## 2021-02-25 RX ORDER — L.ACID,PARA/B.BIFIDUM/S.THERM 8B CELL
1 CAPSULE ORAL 2 TIMES DAILY
Status: DISCONTINUED | OUTPATIENT
Start: 2021-02-25 | End: 2021-03-02 | Stop reason: HOSPADM

## 2021-02-25 RX ORDER — SODIUM CHLORIDE 0.9 % (FLUSH) 0.9 %
3-10 SYRINGE (ML) INJECTION AS NEEDED
Status: DISCONTINUED | OUTPATIENT
Start: 2021-02-25 | End: 2021-02-25 | Stop reason: HOSPADM

## 2021-02-25 RX ORDER — NICOTINE POLACRILEX 4 MG
15 LOZENGE BUCCAL
Status: DISCONTINUED | OUTPATIENT
Start: 2021-02-25 | End: 2021-03-02 | Stop reason: HOSPADM

## 2021-02-25 RX ORDER — PROMETHAZINE HYDROCHLORIDE 25 MG/1
25 SUPPOSITORY RECTAL ONCE AS NEEDED
Status: DISCONTINUED | OUTPATIENT
Start: 2021-02-25 | End: 2021-02-25 | Stop reason: HOSPADM

## 2021-02-25 RX ORDER — IPRATROPIUM BROMIDE AND ALBUTEROL SULFATE 2.5; .5 MG/3ML; MG/3ML
3 SOLUTION RESPIRATORY (INHALATION) ONCE
Status: COMPLETED | OUTPATIENT
Start: 2021-02-25 | End: 2021-02-25

## 2021-02-25 RX ORDER — PROMETHAZINE HYDROCHLORIDE 25 MG/1
25 TABLET ORAL ONCE AS NEEDED
Status: DISCONTINUED | OUTPATIENT
Start: 2021-02-25 | End: 2021-02-25 | Stop reason: HOSPADM

## 2021-02-25 RX ORDER — MORPHINE SULFATE 2 MG/ML
2 INJECTION, SOLUTION INTRAMUSCULAR; INTRAVENOUS
Status: DISCONTINUED | OUTPATIENT
Start: 2021-02-25 | End: 2021-02-27

## 2021-02-25 RX ORDER — FAMOTIDINE 10 MG/ML
20 INJECTION, SOLUTION INTRAVENOUS ONCE
Status: COMPLETED | OUTPATIENT
Start: 2021-02-25 | End: 2021-02-25

## 2021-02-25 RX ORDER — SUCCINYLCHOLINE CHLORIDE 20 MG/ML
INJECTION INTRAMUSCULAR; INTRAVENOUS AS NEEDED
Status: DISCONTINUED | OUTPATIENT
Start: 2021-02-25 | End: 2021-02-25 | Stop reason: SURG

## 2021-02-25 RX ORDER — NALOXONE HCL 0.4 MG/ML
0.2 VIAL (ML) INJECTION AS NEEDED
Status: DISCONTINUED | OUTPATIENT
Start: 2021-02-25 | End: 2021-02-25 | Stop reason: HOSPADM

## 2021-02-25 RX ORDER — SODIUM CHLORIDE 9 MG/ML
75 INJECTION, SOLUTION INTRAVENOUS CONTINUOUS
Status: DISCONTINUED | OUTPATIENT
Start: 2021-02-25 | End: 2021-02-25

## 2021-02-25 RX ORDER — LIDOCAINE HYDROCHLORIDE 10 MG/ML
0.5 INJECTION, SOLUTION EPIDURAL; INFILTRATION; INTRACAUDAL; PERINEURAL ONCE AS NEEDED
Status: DISCONTINUED | OUTPATIENT
Start: 2021-02-25 | End: 2021-02-25 | Stop reason: HOSPADM

## 2021-02-25 RX ORDER — MIDAZOLAM HYDROCHLORIDE 1 MG/ML
1 INJECTION INTRAMUSCULAR; INTRAVENOUS
Status: DISCONTINUED | OUTPATIENT
Start: 2021-02-25 | End: 2021-02-25 | Stop reason: HOSPADM

## 2021-02-25 RX ORDER — HYDRALAZINE HYDROCHLORIDE 20 MG/ML
5 INJECTION INTRAMUSCULAR; INTRAVENOUS
Status: DISCONTINUED | OUTPATIENT
Start: 2021-02-25 | End: 2021-02-25 | Stop reason: HOSPADM

## 2021-02-25 RX ORDER — VASOPRESSIN 20 U/ML
INJECTION PARENTERAL AS NEEDED
Status: DISCONTINUED | OUTPATIENT
Start: 2021-02-25 | End: 2021-02-25 | Stop reason: SURG

## 2021-02-25 RX ORDER — MIDAZOLAM HYDROCHLORIDE 1 MG/ML
0.5 INJECTION INTRAMUSCULAR; INTRAVENOUS
Status: DISCONTINUED | OUTPATIENT
Start: 2021-02-25 | End: 2021-02-25 | Stop reason: HOSPADM

## 2021-02-25 RX ADMIN — ASPIRIN 325 MG: 325 TABLET ORAL at 08:46

## 2021-02-25 RX ADMIN — SODIUM BICARBONATE 25 MEQ: 84 INJECTION, SOLUTION INTRAVENOUS at 16:47

## 2021-02-25 RX ADMIN — LANSOPRAZOLE 30 MG: KIT at 06:10

## 2021-02-25 RX ADMIN — VASOPRESSIN 1 UNITS: 20 INJECTION INTRAVENOUS at 16:47

## 2021-02-25 RX ADMIN — PROPOFOL 20 MG: 10 INJECTION, EMULSION INTRAVENOUS at 16:43

## 2021-02-25 RX ADMIN — PROPOFOL 10 MG: 10 INJECTION, EMULSION INTRAVENOUS at 16:33

## 2021-02-25 RX ADMIN — FAMOTIDINE 20 MG: 10 INJECTION INTRAVENOUS at 16:05

## 2021-02-25 RX ADMIN — PHENYLEPHRINE HYDROCHLORIDE 200 MCG: 10 INJECTION INTRAVENOUS at 16:21

## 2021-02-25 RX ADMIN — PHENYLEPHRINE HYDROCHLORIDE 200 MCG: 10 INJECTION INTRAVENOUS at 16:20

## 2021-02-25 RX ADMIN — ACETAMINOPHEN 650 MG: 325 TABLET, FILM COATED ORAL at 21:02

## 2021-02-25 RX ADMIN — ATORVASTATIN CALCIUM 80 MG: 80 TABLET, FILM COATED ORAL at 21:02

## 2021-02-25 RX ADMIN — LORAZEPAM 0.5 MG: 2 INJECTION INTRAMUSCULAR at 09:52

## 2021-02-25 RX ADMIN — PHENYLEPHRINE HYDROCHLORIDE 200 MCG: 10 INJECTION INTRAVENOUS at 16:29

## 2021-02-25 RX ADMIN — INSULIN HUMAN 4 UNITS: 100 INJECTION, SOLUTION PARENTERAL at 05:58

## 2021-02-25 RX ADMIN — Medication 3 MG: at 21:03

## 2021-02-25 RX ADMIN — SODIUM CHLORIDE 100 ML/HR: 9 INJECTION, SOLUTION INTRAVENOUS at 22:56

## 2021-02-25 RX ADMIN — SUCCINYLCHOLINE CHLORIDE 140 MG: 20 INJECTION, SOLUTION INTRAMUSCULAR; INTRAVENOUS; PARENTERAL at 16:32

## 2021-02-25 RX ADMIN — PROPOFOL 20 MG: 10 INJECTION, EMULSION INTRAVENOUS at 16:32

## 2021-02-25 RX ADMIN — CALCIUM CHLORIDE 1 G: 100 INJECTION, SOLUTION INTRAVENOUS at 16:36

## 2021-02-25 RX ADMIN — LIDOCAINE HYDROCHLORIDE 60 MG: 20 INJECTION, SOLUTION INFILTRATION; PERINEURAL at 16:32

## 2021-02-25 RX ADMIN — PROPOFOL 50 MG: 10 INJECTION, EMULSION INTRAVENOUS at 16:23

## 2021-02-25 RX ADMIN — PHENYLEPHRINE HYDROCHLORIDE 200 MCG: 10 INJECTION INTRAVENOUS at 16:32

## 2021-02-25 RX ADMIN — DEXTROSE MONOHYDRATE 25 G: 500 INJECTION PARENTERAL at 17:28

## 2021-02-25 RX ADMIN — PHENYLEPHRINE HYDROCHLORIDE 200 MCG: 10 INJECTION INTRAVENOUS at 16:27

## 2021-02-25 RX ADMIN — IPRATROPIUM BROMIDE AND ALBUTEROL SULFATE 3 ML: 2.5; .5 SOLUTION RESPIRATORY (INHALATION) at 13:50

## 2021-02-25 RX ADMIN — VASOPRESSIN 1 UNITS: 20 INJECTION INTRAVENOUS at 16:40

## 2021-02-25 RX ADMIN — SODIUM CHLORIDE 500 ML: 9 INJECTION, SOLUTION INTRAVENOUS at 21:57

## 2021-02-25 RX ADMIN — Medication 1 CAPSULE: at 21:02

## 2021-02-25 RX ADMIN — MORPHINE SULFATE 2 MG: 2 INJECTION, SOLUTION INTRAMUSCULAR; INTRAVENOUS at 13:46

## 2021-02-25 RX ADMIN — CEFTRIAXONE SODIUM 2 G: 2 INJECTION, SOLUTION INTRAVENOUS at 05:58

## 2021-02-25 RX ADMIN — ERTAPENEM 1 G: 1 INJECTION INTRAMUSCULAR; INTRAVENOUS at 09:52

## 2021-02-25 NOTE — ANESTHESIA PREPROCEDURE EVALUATION
Anesthesia Evaluation     Patient summary reviewed and Nursing notes reviewed                Airway   Mallampati: II  TM distance: >3 FB  Small opening  Dental    (+) poor dentition        Pulmonary    (+) a smoker Former,   Cardiovascular     ECG reviewed  PT is on anticoagulation therapy  Rhythm: regular  Rate: normal    (+) hypertension, past MI , hyperlipidemia,       Neuro/Psych  (+) CVA, psychiatric history Anxiety and Depression,     GI/Hepatic/Renal/Endo    (+)  GERD, GI bleeding , hepatitis C, liver disease, renal disease ARF, diabetes mellitus type 2 using insulin,     Musculoskeletal (-) negative ROS    Abdominal    Substance History - negative use     OB/GYN negative ob/gyn ROS         Other                        Anesthesia Plan    ASA 5 - emergent     general   Rapid sequence(S/p stroke :aphasia, dysphagia on tube feeds which were held at noon today after roughly 400 cc's of PEG-Tube feedings, right hemiparesis, flexor contractures  Very poor dentition: missing many teeth and those remaining are carious, discolored , and attached to poorly aprearing gums    Urosepsis  Tachycardia  DM 2  Metabolic encephalopathy  Pulmonary venous congestion  Hospitalist ordered NS at 75cc's/hr    Left radial art line placed in pre op holding room  Possible vent post op  RSI)  intravenous induction     Anesthetic plan, all risks, benefits, and alternatives have been provided, discussed and informed consent has been obtained with: patient.

## 2021-02-25 NOTE — ANESTHESIA PROCEDURE NOTES
Arterial Line    Pre-sedation assessment completed: 2/25/2021 3:45 PM    Patient reassessed immediately prior to procedure    Patient location during procedure: holding area  Start time: 2/25/2021 3:45 PM  Stop Time:2/25/2021 3:55 PM       Line placed for hemodynamic monitoring, ABGs/Labs/ISTAT and respiratory failure.  Performed By   Anesthesiologist: Brandon Davies MD  Preanesthetic Checklist  Completed: patient identified, surgical consent, pre-op evaluation, timeout performed, IV checked, risks and benefits discussed and monitors and equipment checked  Arterial Line Prep   Sterile Tech: cap, gloves, gown, mask and sterile barriers  Prep: ChloraPrep  Patient monitoring: blood pressure monitoring, continuous pulse oximetry and EKG  Arterial Line Procedure   Laterality:left  Location:  radial artery  Catheter size: 20 G   Guidance: ultrasound guided  PROCEDURE NOTE/ULTRASOUND INTERPRETATION.  Using ultrasound guidance the potential vascular sites for insertion of the catheter were visualized to determine the patency of the vessel to be used for vascular access.  After selecting the appropriate site for insertion, the needle was visualized under ultrasound being inserted into the radial artery, followed by ultrasound confirmation of wire and catheter placement. There were no abnormalities seen on ultrasound; an image was taken; and the patient tolerated the procedure with no complications.   Number of attempts: 1  Successful placement: yes  Post Assessment   Dressing Type: occlusive dressing applied, secured with tape and wrist guard applied.   Complications no  Circ/Move/Sens Assessment: normal.   Patient Tolerance: patient tolerated the procedure well with no apparent complications

## 2021-02-25 NOTE — ANESTHESIA PROCEDURE NOTES
Airway  Urgency: elective    Date/Time: 2/25/2021 4:23 PM  Airway not difficult    General Information and Staff    Patient location during procedure: OR  Anesthesiologist: Tabatha Remy MD  CRNA: Elsy Abreu CRNA    Indications and Patient Condition  Indications for airway management: airway protection    Preoxygenated: yes  Mask difficulty assessment: 0 - not attempted    Final Airway Details  Final airway type: endotracheal airway      Successful airway: ETT (taperguard Evac )  Cuffed: yes   Successful intubation technique: direct laryngoscopy and RSI  Facilitating devices/methods: intubating stylet  Endotracheal tube insertion site: oral  Blade: Tonya  Blade size: 3  ETT size (mm): 7.5  Cormack-Lehane Classification: grade I - full view of glottis  Placement verified by: chest auscultation and capnometry   Cuff volume (mL): 8  Measured from: lips  ETT/EBT  to lips (cm): 20  Number of attempts at approach: 1  Assessment: lips, teeth, and gum same as pre-op and atraumatic intubation

## 2021-02-25 NOTE — ANESTHESIA POSTPROCEDURE EVALUATION
"Patient: Cam Gonsalves    Procedure Summary     Date: 02/25/21 Room / Location: [facility] OR 01 / [facility] MAIN OR    Anesthesia Start: 1611 Anesthesia Stop: 1703    Procedure: CYSTOSCOPY left retrograde pyelogram, left URETERAL STENT INSERTION (Left ) Diagnosis:     Surgeon: Rick Charles Jr., MD Provider: Tabatha Remy MD    Anesthesia Type: general ASA Status: 5 - Emergent          Anesthesia Type: general    Vitals  Vitals Value Taken Time   /70 02/25/21 1800   Temp 37.5 °C (99.5 °F) 02/25/21 1657   Pulse 124 02/25/21 1811   Resp 23 02/25/21 1800   SpO2 98 % 02/25/21 1813   Vitals shown include unvalidated device data.        Post Anesthesia Care and Evaluation    Patient participation: complete - patient cannot participate  Anesthetic complications: No anesthetic complications    Cardiovascular status: acceptable  Respiratory status: acceptable    Comments: Patient record reviewed. Patient stable and discharged prior to being evaluated. No apparent anesthetic complications.  THIS CASE IS NOT MEDICALLY DIRECTED.  /80   Pulse (!) 130   Temp 37.3 °C (99.1 °F) (Oral)   Resp (!) 30   Ht 172.7 cm (67.99\")   Wt 44 kg (97 lb) Comment: not weighed by RD  SpO2 96%   BMI 14.75 kg/m²       "

## 2021-02-26 ENCOUNTER — APPOINTMENT (OUTPATIENT)
Dept: GENERAL RADIOLOGY | Facility: HOSPITAL | Age: 76
End: 2021-02-26

## 2021-02-26 LAB
ANION GAP SERPL CALCULATED.3IONS-SCNC: 6.6 MMOL/L (ref 5–15)
BACTERIA BLD CULT: ABNORMAL
BACTERIA ID TEST ISLT QL CULT: ABNORMAL
BUN SERPL-MCNC: 32 MG/DL (ref 8–23)
BUN/CREAT SERPL: 36.8 (ref 7–25)
CALCIUM SPEC-SCNC: 8.6 MG/DL (ref 8.6–10.5)
CHLORIDE SERPL-SCNC: 118 MMOL/L (ref 98–107)
CO2 SERPL-SCNC: 22.4 MMOL/L (ref 22–29)
CREAT SERPL-MCNC: 0.87 MG/DL (ref 0.76–1.27)
D-LACTATE SERPL-SCNC: 1.2 MMOL/L (ref 0.5–2)
DEPRECATED RDW RBC AUTO: 39.4 FL (ref 37–54)
ERYTHROCYTE [DISTWIDTH] IN BLOOD BY AUTOMATED COUNT: 13.7 % (ref 12.3–15.4)
GFR SERPL CREATININE-BSD FRML MDRD: 103 ML/MIN/1.73
GLUCOSE BLDC GLUCOMTR-MCNC: 134 MG/DL (ref 70–130)
GLUCOSE BLDC GLUCOMTR-MCNC: 134 MG/DL (ref 70–130)
GLUCOSE BLDC GLUCOMTR-MCNC: 97 MG/DL (ref 70–130)
GLUCOSE SERPL-MCNC: 105 MG/DL (ref 65–99)
HCT VFR BLD AUTO: 30.7 % (ref 37.5–51)
HGB BLD-MCNC: 10 G/DL (ref 13–17.7)
LACTATE HOLD SPECIMEN: NORMAL
MCH RBC QN AUTO: 25.9 PG (ref 26.6–33)
MCHC RBC AUTO-ENTMCNC: 32.6 G/DL (ref 31.5–35.7)
MCV RBC AUTO: 79.5 FL (ref 79–97)
PLATELET # BLD AUTO: 156 10*3/MM3 (ref 140–450)
PMV BLD AUTO: 13.6 FL (ref 6–12)
POTASSIUM SERPL-SCNC: 3.8 MMOL/L (ref 3.5–5.2)
RBC # BLD AUTO: 3.86 10*6/MM3 (ref 4.14–5.8)
SODIUM SERPL-SCNC: 147 MMOL/L (ref 136–145)
WBC # BLD AUTO: 25.74 10*3/MM3 (ref 3.4–10.8)

## 2021-02-26 PROCEDURE — 80048 BASIC METABOLIC PNL TOTAL CA: CPT | Performed by: UROLOGY

## 2021-02-26 PROCEDURE — 99232 SBSQ HOSP IP/OBS MODERATE 35: CPT | Performed by: PSYCHIATRY & NEUROLOGY

## 2021-02-26 PROCEDURE — 71045 X-RAY EXAM CHEST 1 VIEW: CPT

## 2021-02-26 PROCEDURE — 25010000002 ERTAPENEM PER 500 MG: Performed by: UROLOGY

## 2021-02-26 PROCEDURE — 82962 GLUCOSE BLOOD TEST: CPT

## 2021-02-26 PROCEDURE — 99232 SBSQ HOSP IP/OBS MODERATE 35: CPT | Performed by: INTERNAL MEDICINE

## 2021-02-26 PROCEDURE — 83605 ASSAY OF LACTIC ACID: CPT | Performed by: INTERNAL MEDICINE

## 2021-02-26 PROCEDURE — 85027 COMPLETE CBC AUTOMATED: CPT | Performed by: UROLOGY

## 2021-02-26 PROCEDURE — 87040 BLOOD CULTURE FOR BACTERIA: CPT | Performed by: UROLOGY

## 2021-02-26 RX ORDER — SODIUM CHLORIDE 450 MG/100ML
75 INJECTION, SOLUTION INTRAVENOUS CONTINUOUS
Status: DISCONTINUED | OUTPATIENT
Start: 2021-02-26 | End: 2021-02-26

## 2021-02-26 RX ORDER — POLYETHYLENE GLYCOL 3350 17 G/17G
17 POWDER, FOR SOLUTION ORAL DAILY
Status: DISCONTINUED | OUTPATIENT
Start: 2021-02-26 | End: 2021-02-28

## 2021-02-26 RX ADMIN — SODIUM CHLORIDE, PRESERVATIVE FREE 10 ML: 5 INJECTION INTRAVENOUS at 21:11

## 2021-02-26 RX ADMIN — POLYETHYLENE GLYCOL 3350 17 G: 17 POWDER, FOR SOLUTION ORAL at 14:12

## 2021-02-26 RX ADMIN — SODIUM CHLORIDE 1000 ML: 9 INJECTION, SOLUTION INTRAVENOUS at 00:04

## 2021-02-26 RX ADMIN — Medication 3 MG: at 18:08

## 2021-02-26 RX ADMIN — SODIUM CHLORIDE, PRESERVATIVE FREE 10 ML: 5 INJECTION INTRAVENOUS at 21:08

## 2021-02-26 RX ADMIN — LANSOPRAZOLE 30 MG: KIT at 08:15

## 2021-02-26 RX ADMIN — ASPIRIN 325 MG: 325 TABLET ORAL at 08:16

## 2021-02-26 RX ADMIN — ATORVASTATIN CALCIUM 80 MG: 80 TABLET, FILM COATED ORAL at 21:07

## 2021-02-26 RX ADMIN — Medication 1 CAPSULE: at 08:16

## 2021-02-26 RX ADMIN — ERTAPENEM 1 G: 1 INJECTION INTRAMUSCULAR; INTRAVENOUS at 08:22

## 2021-02-26 RX ADMIN — SODIUM CHLORIDE 75 ML/HR: 4.5 INJECTION, SOLUTION INTRAVENOUS at 08:20

## 2021-02-27 LAB
BACTERIA SPEC AEROBE CULT: ABNORMAL
BACTERIA SPEC AEROBE CULT: ABNORMAL
GLUCOSE BLDC GLUCOMTR-MCNC: 135 MG/DL (ref 70–130)
GLUCOSE BLDC GLUCOMTR-MCNC: 144 MG/DL (ref 70–130)
GLUCOSE BLDC GLUCOMTR-MCNC: 146 MG/DL (ref 70–130)
GLUCOSE BLDC GLUCOMTR-MCNC: 148 MG/DL (ref 70–130)
GLUCOSE BLDC GLUCOMTR-MCNC: 162 MG/DL (ref 70–130)
GRAM STN SPEC: ABNORMAL
GRAM STN SPEC: ABNORMAL
ISOLATED FROM: ABNORMAL
ISOLATED FROM: ABNORMAL

## 2021-02-27 PROCEDURE — 25010000002 CEFEPIME PER 500 MG: Performed by: INTERNAL MEDICINE

## 2021-02-27 PROCEDURE — 63710000001 INSULIN REGULAR HUMAN PER 5 UNITS: Performed by: INTERNAL MEDICINE

## 2021-02-27 PROCEDURE — 99232 SBSQ HOSP IP/OBS MODERATE 35: CPT | Performed by: INTERNAL MEDICINE

## 2021-02-27 PROCEDURE — 82962 GLUCOSE BLOOD TEST: CPT

## 2021-02-27 RX ORDER — OLANZAPINE 10 MG/1
5 INJECTION, POWDER, LYOPHILIZED, FOR SOLUTION INTRAMUSCULAR EVERY 8 HOURS PRN
Status: DISCONTINUED | OUTPATIENT
Start: 2021-02-27 | End: 2021-03-02 | Stop reason: HOSPADM

## 2021-02-27 RX ADMIN — INSULIN HUMAN 2 UNITS: 100 INJECTION, SOLUTION PARENTERAL at 12:21

## 2021-02-27 RX ADMIN — CEFEPIME HYDROCHLORIDE 2 G: 2 INJECTION, POWDER, FOR SOLUTION INTRAVENOUS at 11:09

## 2021-02-27 RX ADMIN — Medication 1 CAPSULE: at 09:17

## 2021-02-27 RX ADMIN — ASPIRIN 325 MG: 325 TABLET ORAL at 09:17

## 2021-02-27 RX ADMIN — CEFEPIME HYDROCHLORIDE 2 G: 2 INJECTION, POWDER, FOR SOLUTION INTRAVENOUS at 21:24

## 2021-02-27 RX ADMIN — LANSOPRAZOLE 30 MG: KIT at 06:46

## 2021-02-27 RX ADMIN — Medication 3 MG: at 18:11

## 2021-02-27 RX ADMIN — Medication 1 CAPSULE: at 00:10

## 2021-02-27 RX ADMIN — Medication 1 CAPSULE: at 21:24

## 2021-02-28 LAB
ALBUMIN SERPL-MCNC: 2.9 G/DL (ref 3.5–5.2)
ALBUMIN/GLOB SERPL: 1 G/DL
ALP SERPL-CCNC: 81 U/L (ref 39–117)
ALT SERPL W P-5'-P-CCNC: 24 U/L (ref 1–41)
ANION GAP SERPL CALCULATED.3IONS-SCNC: 10.1 MMOL/L (ref 5–15)
AST SERPL-CCNC: 30 U/L (ref 1–40)
BACTERIA SPEC AEROBE CULT: ABNORMAL
BACTERIA SPEC AEROBE CULT: ABNORMAL
BILIRUB SERPL-MCNC: 0.2 MG/DL (ref 0–1.2)
BUN SERPL-MCNC: 26 MG/DL (ref 8–23)
BUN/CREAT SERPL: 37.1 (ref 7–25)
CALCIUM SPEC-SCNC: 8.4 MG/DL (ref 8.6–10.5)
CHLORIDE SERPL-SCNC: 116 MMOL/L (ref 98–107)
CO2 SERPL-SCNC: 24.9 MMOL/L (ref 22–29)
CREAT SERPL-MCNC: 0.7 MG/DL (ref 0.76–1.27)
DEPRECATED RDW RBC AUTO: 41 FL (ref 37–54)
ERYTHROCYTE [DISTWIDTH] IN BLOOD BY AUTOMATED COUNT: 13.7 % (ref 12.3–15.4)
GFR SERPL CREATININE-BSD FRML MDRD: 133 ML/MIN/1.73
GLOBULIN UR ELPH-MCNC: 2.9 GM/DL
GLUCOSE BLDC GLUCOMTR-MCNC: 144 MG/DL (ref 70–130)
GLUCOSE BLDC GLUCOMTR-MCNC: 168 MG/DL (ref 70–130)
GLUCOSE BLDC GLUCOMTR-MCNC: 178 MG/DL (ref 70–130)
GLUCOSE SERPL-MCNC: 162 MG/DL (ref 65–99)
GRAM STN SPEC: ABNORMAL
HCT VFR BLD AUTO: 36 % (ref 37.5–51)
HGB BLD-MCNC: 11.7 G/DL (ref 13–17.7)
ISOLATED FROM: ABNORMAL
ISOLATED FROM: ABNORMAL
MAGNESIUM SERPL-MCNC: 2 MG/DL (ref 1.6–2.4)
MCH RBC QN AUTO: 27 PG (ref 26.6–33)
MCHC RBC AUTO-ENTMCNC: 32.5 G/DL (ref 31.5–35.7)
MCV RBC AUTO: 82.9 FL (ref 79–97)
PHOSPHATE SERPL-MCNC: 1.7 MG/DL (ref 2.5–4.5)
PLATELET # BLD AUTO: 163 10*3/MM3 (ref 140–450)
PMV BLD AUTO: 13.3 FL (ref 6–12)
POTASSIUM SERPL-SCNC: 3.2 MMOL/L (ref 3.5–5.2)
PROT SERPL-MCNC: 5.8 G/DL (ref 6–8.5)
RBC # BLD AUTO: 4.34 10*6/MM3 (ref 4.14–5.8)
SODIUM SERPL-SCNC: 151 MMOL/L (ref 136–145)
WBC # BLD AUTO: 10.86 10*3/MM3 (ref 3.4–10.8)

## 2021-02-28 PROCEDURE — 80053 COMPREHEN METABOLIC PANEL: CPT | Performed by: HOSPITALIST

## 2021-02-28 PROCEDURE — 63710000001 INSULIN REGULAR HUMAN PER 5 UNITS: Performed by: INTERNAL MEDICINE

## 2021-02-28 PROCEDURE — 84100 ASSAY OF PHOSPHORUS: CPT | Performed by: HOSPITALIST

## 2021-02-28 PROCEDURE — 83735 ASSAY OF MAGNESIUM: CPT | Performed by: HOSPITALIST

## 2021-02-28 PROCEDURE — 85027 COMPLETE CBC AUTOMATED: CPT | Performed by: HOSPITALIST

## 2021-02-28 PROCEDURE — 82962 GLUCOSE BLOOD TEST: CPT

## 2021-02-28 PROCEDURE — 25010000002 CEFEPIME PER 500 MG: Performed by: INTERNAL MEDICINE

## 2021-02-28 RX ORDER — POTASSIUM CHLORIDE 1.5 G/1.77G
40 POWDER, FOR SOLUTION ORAL ONCE
Status: COMPLETED | OUTPATIENT
Start: 2021-02-28 | End: 2021-02-28

## 2021-02-28 RX ORDER — POLYETHYLENE GLYCOL 3350 17 G/17G
17 POWDER, FOR SOLUTION ORAL DAILY PRN
Status: DISCONTINUED | OUTPATIENT
Start: 2021-02-28 | End: 2021-03-02 | Stop reason: HOSPADM

## 2021-02-28 RX ORDER — DEXTROSE MONOHYDRATE 50 MG/ML
100 INJECTION, SOLUTION INTRAVENOUS CONTINUOUS
Status: DISCONTINUED | OUTPATIENT
Start: 2021-02-28 | End: 2021-03-01

## 2021-02-28 RX ADMIN — SODIUM PHOSPHATE, MONOBASIC, MONOHYDRATE 20 MMOL: 276; 142 INJECTION, SOLUTION INTRAVENOUS at 13:23

## 2021-02-28 RX ADMIN — POTASSIUM CHLORIDE 40 MEQ: 1.5 POWDER, FOR SOLUTION ORAL at 11:35

## 2021-02-28 RX ADMIN — Medication 3 MG: at 18:18

## 2021-02-28 RX ADMIN — CEFEPIME HYDROCHLORIDE 2 G: 2 INJECTION, POWDER, FOR SOLUTION INTRAVENOUS at 09:48

## 2021-02-28 RX ADMIN — INSULIN HUMAN 2 UNITS: 100 INJECTION, SOLUTION PARENTERAL at 07:41

## 2021-02-28 RX ADMIN — Medication 1 CAPSULE: at 09:18

## 2021-02-28 RX ADMIN — INSULIN HUMAN 2 UNITS: 100 INJECTION, SOLUTION PARENTERAL at 18:18

## 2021-02-28 RX ADMIN — Medication 1 CAPSULE: at 20:03

## 2021-02-28 RX ADMIN — DEXTROSE MONOHYDRATE 100 ML/HR: 50 INJECTION, SOLUTION INTRAVENOUS at 11:34

## 2021-02-28 RX ADMIN — LANSOPRAZOLE 30 MG: KIT at 06:10

## 2021-02-28 RX ADMIN — ASPIRIN 325 MG: 325 TABLET ORAL at 09:18

## 2021-02-28 RX ADMIN — CEFEPIME HYDROCHLORIDE 2 G: 2 INJECTION, POWDER, FOR SOLUTION INTRAVENOUS at 20:03

## 2021-03-01 LAB
ALBUMIN SERPL-MCNC: 2.9 G/DL (ref 3.5–5.2)
ALBUMIN/GLOB SERPL: 1 G/DL
ALP SERPL-CCNC: 72 U/L (ref 39–117)
ALT SERPL W P-5'-P-CCNC: 26 U/L (ref 1–41)
ANION GAP SERPL CALCULATED.3IONS-SCNC: 7.9 MMOL/L (ref 5–15)
AST SERPL-CCNC: 26 U/L (ref 1–40)
BILIRUB SERPL-MCNC: 0.3 MG/DL (ref 0–1.2)
BUN SERPL-MCNC: 22 MG/DL (ref 8–23)
BUN/CREAT SERPL: 35.5 (ref 7–25)
CALCIUM SPEC-SCNC: 8.5 MG/DL (ref 8.6–10.5)
CHLORIDE SERPL-SCNC: 112 MMOL/L (ref 98–107)
CO2 SERPL-SCNC: 26.1 MMOL/L (ref 22–29)
CREAT SERPL-MCNC: 0.62 MG/DL (ref 0.76–1.27)
DEPRECATED RDW RBC AUTO: 39.8 FL (ref 37–54)
ERYTHROCYTE [DISTWIDTH] IN BLOOD BY AUTOMATED COUNT: 13.9 % (ref 12.3–15.4)
GFR SERPL CREATININE-BSD FRML MDRD: >150 ML/MIN/1.73
GLOBULIN UR ELPH-MCNC: 2.9 GM/DL
GLUCOSE BLDC GLUCOMTR-MCNC: 118 MG/DL (ref 70–130)
GLUCOSE BLDC GLUCOMTR-MCNC: 130 MG/DL (ref 70–130)
GLUCOSE BLDC GLUCOMTR-MCNC: 150 MG/DL (ref 70–130)
GLUCOSE BLDC GLUCOMTR-MCNC: 159 MG/DL (ref 70–130)
GLUCOSE SERPL-MCNC: 170 MG/DL (ref 65–99)
HCT VFR BLD AUTO: 34.7 % (ref 37.5–51)
HGB BLD-MCNC: 11.2 G/DL (ref 13–17.7)
MAGNESIUM SERPL-MCNC: 1.9 MG/DL (ref 1.6–2.4)
MCH RBC QN AUTO: 25.6 PG (ref 26.6–33)
MCHC RBC AUTO-ENTMCNC: 32.3 G/DL (ref 31.5–35.7)
MCV RBC AUTO: 79.4 FL (ref 79–97)
OSMOLALITY UR: 456 MOSM/KG
PHOSPHATE SERPL-MCNC: 2.2 MG/DL (ref 2.5–4.5)
PLATELET # BLD AUTO: 187 10*3/MM3 (ref 140–450)
PMV BLD AUTO: 12.6 FL (ref 6–12)
POTASSIUM SERPL-SCNC: 3.4 MMOL/L (ref 3.5–5.2)
PREALB SERPL-MCNC: 9.3 MG/DL (ref 20–40)
PROT SERPL-MCNC: 5.8 G/DL (ref 6–8.5)
RBC # BLD AUTO: 4.37 10*6/MM3 (ref 4.14–5.8)
SODIUM SERPL-SCNC: 146 MMOL/L (ref 136–145)
SODIUM UR-SCNC: 91 MMOL/L
WBC # BLD AUTO: 11.01 10*3/MM3 (ref 3.4–10.8)

## 2021-03-01 PROCEDURE — 25010000002 CEFEPIME PER 500 MG: Performed by: INTERNAL MEDICINE

## 2021-03-01 PROCEDURE — 80053 COMPREHEN METABOLIC PANEL: CPT | Performed by: HOSPITALIST

## 2021-03-01 PROCEDURE — 83935 ASSAY OF URINE OSMOLALITY: CPT | Performed by: INTERNAL MEDICINE

## 2021-03-01 PROCEDURE — 85027 COMPLETE CBC AUTOMATED: CPT | Performed by: HOSPITALIST

## 2021-03-01 PROCEDURE — 84100 ASSAY OF PHOSPHORUS: CPT | Performed by: HOSPITALIST

## 2021-03-01 PROCEDURE — 63710000001 INSULIN REGULAR HUMAN PER 5 UNITS: Performed by: INTERNAL MEDICINE

## 2021-03-01 PROCEDURE — 84100 ASSAY OF PHOSPHORUS: CPT | Performed by: INTERNAL MEDICINE

## 2021-03-01 PROCEDURE — 84300 ASSAY OF URINE SODIUM: CPT | Performed by: INTERNAL MEDICINE

## 2021-03-01 PROCEDURE — 99232 SBSQ HOSP IP/OBS MODERATE 35: CPT | Performed by: INTERNAL MEDICINE

## 2021-03-01 PROCEDURE — 82962 GLUCOSE BLOOD TEST: CPT

## 2021-03-01 PROCEDURE — 84132 ASSAY OF SERUM POTASSIUM: CPT | Performed by: INTERNAL MEDICINE

## 2021-03-01 PROCEDURE — 84134 ASSAY OF PREALBUMIN: CPT | Performed by: HOSPITALIST

## 2021-03-01 PROCEDURE — 83735 ASSAY OF MAGNESIUM: CPT | Performed by: INTERNAL MEDICINE

## 2021-03-01 RX ORDER — POTASSIUM CHLORIDE 1.5 G/1.77G
40 POWDER, FOR SOLUTION ORAL AS NEEDED
Status: DISCONTINUED | OUTPATIENT
Start: 2021-03-01 | End: 2021-03-02 | Stop reason: HOSPADM

## 2021-03-01 RX ORDER — POTASSIUM CHLORIDE 750 MG/1
40 TABLET, FILM COATED, EXTENDED RELEASE ORAL AS NEEDED
Status: DISCONTINUED | OUTPATIENT
Start: 2021-03-01 | End: 2021-03-02 | Stop reason: HOSPADM

## 2021-03-01 RX ADMIN — ASPIRIN 325 MG: 325 TABLET ORAL at 08:41

## 2021-03-01 RX ADMIN — POTASSIUM CHLORIDE 40 MEQ: 1.5 POWDER, FOR SOLUTION ORAL at 13:46

## 2021-03-01 RX ADMIN — Medication 2 PACKET: at 17:34

## 2021-03-01 RX ADMIN — INSULIN HUMAN 2 UNITS: 100 INJECTION, SOLUTION PARENTERAL at 06:28

## 2021-03-01 RX ADMIN — CEFEPIME HYDROCHLORIDE 2 G: 2 INJECTION, POWDER, FOR SOLUTION INTRAVENOUS at 08:42

## 2021-03-01 RX ADMIN — Medication 1 CAPSULE: at 20:17

## 2021-03-01 RX ADMIN — Medication 3 MG: at 17:33

## 2021-03-01 RX ADMIN — INSULIN HUMAN 2 UNITS: 100 INJECTION, SOLUTION PARENTERAL at 12:04

## 2021-03-01 RX ADMIN — CEFEPIME HYDROCHLORIDE 2 G: 2 INJECTION, POWDER, FOR SOLUTION INTRAVENOUS at 20:17

## 2021-03-01 RX ADMIN — POTASSIUM CHLORIDE 40 MEQ: 1.5 POWDER, FOR SOLUTION ORAL at 17:33

## 2021-03-01 RX ADMIN — LANSOPRAZOLE 30 MG: KIT at 06:28

## 2021-03-01 RX ADMIN — Medication 1 CAPSULE: at 08:41

## 2021-03-01 NOTE — PROGRESS NOTES
LOS: 5 days     Chief Complaint:  Follow-up E coli  And proteus septicemia    Interval History: Afebrile, no new events over the weekend.  Loose bowel movements but no teddy diarrhea.  Tolerating antibiotics without a rash    Vital Signs  Temp:  [97.5 °F (36.4 °C)-98.4 °F (36.9 °C)] 97.8 °F (36.6 °C)  Heart Rate:  [68-81] 71  Resp:  [20] 20  BP: (106-162)/(75-97) 133/97    Physical Exam:  General:  chronically ill appearing  Cardiovascular: RRR, no LE edema   Respiratory:  Crackles at the bases bilaterally  GI: Abdomen w/ PEG, nontender, nondistended, positive bowel sounds bilaterally  Skin: no rashes     Antibiotics:  Cefepime 2 g IV every 12 hours    LABS:  CBC, BMP, micro reviewed today  Lab Results   Component Value Date    WBC 11.01 (H) 03/01/2021    HGB 11.2 (L) 03/01/2021    HCT 34.7 (L) 03/01/2021    MCV 79.4 03/01/2021     03/01/2021     Lab Results   Component Value Date    GLUCOSE 170 (H) 03/01/2021    BUN 22 03/01/2021    CREATININE 0.62 (L) 03/01/2021    EGFRIFAFRI >150 03/01/2021    BCR 35.5 (H) 03/01/2021    CO2 26.1 03/01/2021    CALCIUM 8.5 (L) 03/01/2021    ALBUMIN 2.90 (L) 03/01/2021    LABIL2 1.4 09/04/2019    AST 26 03/01/2021    ALT 26 03/01/2021     Lab Results   Component Value Date    HGBA1C 7.10 (H) 02/24/2021       Microbiology:  2/26 BCx NGTD x 2  2/24 COVID negative  2/24 BCx: E coli and proteus   2/24 UCx: >100k mixed anabelle  2/24 MRSA nares positive     Assessment/Plan   1.  E coli septicemia   2. TME  3. Leukocytosis  4. History of C diff  5. Uncontrolled type 2 diabetes  6. History of stroke resulting in right-sided weakness, aphasia, and dysphagia with PEG tube in place  7.  Left moderate hydroureteronephrosis with stone present status post left stent placement on February 25     The patient remains afebrile.  White blood cell count returning to normal.  Repeat blood cultures are negative to date.  Plan for stone removal in 2 weeks.  We will continue antibiotics for another 2  weeks to ensure that he does not have a recurrence of infection prior to stent removal    Final antibiotic recommendations  1.  Continue cefepime 2 g IV every 12 hours x14 days.  Antibiotic stop date March 11    2.  Please monitor weekly CBC with differential and CMP and fax the results to infectious disease clinic at [phone]    3.  Continue probiotic given history of C. difficile colitis    ID will sign off.  Please do not hesitate to call us with further questions or concerns

## 2021-03-01 NOTE — CONSULTS
"  Thank you very much for the consult    Reason For The Consult: hypernatremia , multiple electrolyte issues    HPI: 75 years old gentleman with history of type 2 diabetes hypertension hyperlipidemia history of CVA and cerebral  infarction with aphasia, the  patient is status post PEG placement, the patient looks clinically dry.  Has serum sodium of 151 and multiple other electrolyte issues  PMH:   Past Medical History:   Diagnosis Date   • Anxiety    • Aphasia    • Cerebral infarction (CMS/HCC)    • Chronic viral hepatitis C (CMS/HCC)    • Diabetes mellitus (CMS/HCC)    • Dysphagia, oropharyngeal phase    • Enterocolitis    • Hemiplegia and hemiparesis following cerebral infarction affecting right dominant side (CMS/HCC)    • Hyperlipidemia    • Hypertension    • Kidney failure, acute (CMS/HCC)    • Myocardial infarction (CMS/HCC)    • Rhabdomyolysis    • Stroke (CMS/HCC)       Past Surgical History:   Procedure Laterality Date   • CYSTOSCOPY W/ URETERAL STENT PLACEMENT Left 2/25/2021    Procedure: CYSTOSCOPY left retrograde pyelogram, left URETERAL STENT INSERTION;  Surgeon: Rick Charles Jr., MD;  Location: [facility];  Service: Urology;  Laterality: Left;     FHX: History reviewed. No pertinent family history.  SHX:   Social History     Substance and Sexual Activity   Alcohol Use Not Currently      Social History     Tobacco Use   Smoking Status Former Smoker   Smokeless Tobacco Never Used      Social History     Substance and Sexual Activity   Drug Use Not Currently    Comment: unknown - per guardian he did use \"about everything\"        Home Medications:   Medications Prior to Admission   Medication Sig Dispense Refill Last Dose   • acetaminophen (TYLENOL) 160 MG/5ML solution 650 mg by Per G Tube route Every 6 (Six) Hours As Needed for Mild Pain  or Fever.      • busPIRone (BUSPAR) 5 MG tablet Take 5 mg by mouth 3 (Three) Times a Day.      • busPIRone (BUSPAR) 7.5 MG tablet 7.5 mg by Per G Tube route " 3 (Three) Times a Day.      • chlorhexidine (PERIDEX) 0.12 % solution Apply 15 mL to the mouth or throat 2 (Two) Times a Day.      • lansoprazole 3 mg/mL in sodium bicarbonate injection 8.4% 15 mg by Per G Tube route Daily.      • traMADol (ULTRAM) 50 MG tablet 50 mg by Per G Tube route 3 (Three) Times a Day As Needed for Moderate Pain .          Allergies: No Known Allergies    Physical Exam:  General: In no acute distress aphasic mucosa dry    Vital Signs  Vitals:    02/28/21 1954   BP: 142/79   Pulse: 69   Resp: 20   Temp: 98.2 °F (36.8 °C)   SpO2: 98%     No intake/output data recorded.  I/O last 3 completed shifts:  In: 1681 [Other:60; NG/GT:1321; IV Piggyback:300]  Out: 1650 [Urine:1650]      HEENT:  Normo cephalic, Atraumatic, EOMI, PERRLA, NO icterus,  Neck:  Supple, No JVD, No Carotid artery bruit, No lymphadenopathy  Chest: Clear to auscultation bilaterally,   Cardiovascular: Regular rate and rhythm. Positive S1 & S2, No rub, murmur or gallop.  Abdominal: Soft, non tender, no palpable organomegaly, no abdominal bruit, positive bowel sounds  Musculoskeletal: No joint tenderness or swelling, good range of motion, Adequate muscle strength on both sides, no cyanosis, clubbing or edema on lower extremities.  Neuro: Aphasic      Review of Systems:     Current Labs  [unfilled]  Lab Results (last 24 hours)     Procedure Component Value Units Date/Time    POC Glucose Once [[id number]]  (Abnormal) Collected: 02/28/21 1804    Specimen: Blood Updated: 02/28/21 1806     Glucose 178 mg/dL     POC Glucose Once [[id number]]  (Abnormal) Collected: 02/28/21 1200    Specimen: Blood Updated: 02/28/21 1204     Glucose 144 mg/dL     Phosphorus [[id number]]  (Abnormal) Collected: 02/28/21 0659    Specimen: Blood Updated: 02/28/21 0812     Phosphorus 1.7 mg/dL     Comprehensive Metabolic Panel [[id number]]  (Abnormal) Collected: 02/28/21 0659    Specimen: Blood Updated: 02/28/21 0801     Glucose 162 mg/dL      BUN 26 mg/dL       Creatinine 0.70 mg/dL      Sodium 151 mmol/L      Potassium 3.2 mmol/L      Chloride 116 mmol/L      CO2 24.9 mmol/L      Calcium 8.4 mg/dL      Total Protein 5.8 g/dL      Albumin 2.90 g/dL      ALT (SGPT) 24 U/L      AST (SGOT) 30 U/L      Alkaline Phosphatase 81 U/L      Total Bilirubin 0.2 mg/dL      eGFR  African Amer 133 mL/min/1.73      Globulin 2.9 gm/dL      A/G Ratio 1.0 g/dL      BUN/Creatinine Ratio 37.1     Anion Gap 10.1 mmol/L     Narrative:      GFR Normal >60  Chronic Kidney Disease <60  Kidney Failure <15      Magnesium [[id number]]  (Normal) Collected: 02/28/21 0659    Specimen: Blood Updated: 02/28/21 0758     Magnesium 2.0 mg/dL     CBC (No Diff) [[id number]]  (Abnormal) Collected: 02/28/21 0659    Specimen: Blood Updated: 02/28/21 0742     WBC 10.86 10*3/mm3      RBC 4.34 10*6/mm3      Hemoglobin 11.7 g/dL      Hematocrit 36.0 %      MCV 82.9 fL      MCH 27.0 pg      MCHC 32.5 g/dL      RDW 13.7 %      RDW-SD 41.0 fl      MPV 13.3 fL      Platelets 163 10*3/mm3     POC Glucose Once [[id number]]  (Abnormal) Collected: 02/28/21 0634    Specimen: Blood Updated: 02/28/21 0636     Glucose 168 mg/dL     Blood Culture - Blood, Arm, Left [[id number]]  (Abnormal)  (Susceptibility) Collected: 02/24/21 0607    Specimen: Blood from Arm, Left Updated: 02/28/21 0612     Blood Culture Escherichia coli     Isolated from Anaerobic Bottle     Blood Culture Proteus mirabilis     Isolated from Anaerobic Bottle     Gram Stain Anaerobic Bottle Gram negative bacilli    Narrative:      2/27 Dr. Guajardo requested Cefazolin on both organisms    Susceptibility      Escherichia coli     ART Not Specified     Ampicillin Resistant      Ampicillin + Sulbactam Resistant      Cefazolin  Resistant     Cefepime Susceptible      Ceftazidime Resistant      Ceftriaxone Resistant      Gentamicin Susceptible      Levofloxacin Resistant      Piperacillin + Tazobactam Susceptible      Trimethoprim + Sulfamethoxazole Resistant                    Susceptibility      Proteus mirabilis     ART Not Specified     Ampicillin Susceptible      Ampicillin + Sulbactam Susceptible      Cefazolin  Intermediate     Cefepime Susceptible      Ceftazidime Susceptible      Ceftriaxone Susceptible      Gentamicin Susceptible      Levofloxacin Intermediate      Piperacillin + Tazobactam Susceptible      Trimethoprim + Sulfamethoxazole Susceptible                   Susceptibility Comments     Escherichia coli    Cefazolin sensitivity will not be reported for Enterobacteriaceae in non-urine isolates. If cefazolin is preferred, please call the microbiology lab to request an E-test.  With the exception of urinary-sourced infections, aminoglycosides should not be used as monotherapy.    Proteus mirabilis    Cefazolin sensitivity will not be reported for Enterobacteriaceae in non-urine isolates. If cefazolin is preferred, please call the microbiology lab to request an E-test.  With the exception of urinary-sourced infections, aminoglycosides should not be used as monotherapy.             Blood Culture - Blood, Arm, Left [[id number]] Collected: 02/26/21 0459    Specimen: Blood from Arm, Left Updated: 02/28/21 0515     Blood Culture No growth at 2 days    Blood Culture - Blood, Arm, Left [[id number]] Collected: 02/26/21 0412    Specimen: Blood from Arm, Left Updated: 02/28/21 0500     Blood Culture No growth at 2 days    POC Glucose Once [[id number]]  (Abnormal) Collected: 02/27/21 2347    Specimen: Blood Updated: 02/27/21 2348     Glucose 146 mg/dL         Current Radiology  Imaging Results (Last 24 Hours)     ** No results found for the last 24 hours. **        Current Meds    Current Facility-Administered Medications:   •  acetaminophen (TYLENOL) tablet 650 mg, 650 mg, Oral, Q4H PRN, 650 mg at 02/25/21 2102 **OR** acetaminophen (TYLENOL) suppository 650 mg, 650 mg, Rectal, Q4H PRN, Jony Shields MD  •  aspirin tablet 325 mg, 325 mg, Oral, Daily, 325 mg at 02/28/21 0918 **OR**  aspirin suppository 300 mg, 300 mg, Rectal, Daily, Jony Shields MD  •  bisacodyl (DULCOLAX) suppository 10 mg, 10 mg, Rectal, Daily PRN, Jony Shields MD  •  cefepime (MAXIPIME) 2 g/100 mL 0.9% NS (mbp), 2 g, Intravenous, Q12H, Hanna Guajardo MD, 2 g at 02/28/21 2003  •  dextrose (D50W) 25 g/ 50mL Intravenous Solution 25 g, 25 g, Intravenous, Q15 Min PRN, Jony Shields MD  •  dextrose (D50W) 25 g/ 50mL Intravenous Solution 25 g, 25 g, Intravenous, Q15 Min PRN, Jony Shields MD, 25 g at 02/25/21 1728  •  dextrose (D5W) 5 % infusion, 100 mL/hr, Intravenous, Continuous, Osvaldo Simons MD, Last Rate: 100 mL/hr at 02/28/21 1134, 100 mL/hr at 02/28/21 1134  •  dextrose (GLUTOSE) oral gel 15 g, 15 g, Oral, Q15 Min PRN, Jony Shields MD  •  dextrose (GLUTOSE) oral gel 15 g, 15 g, Oral, Q15 Min PRN, Jony Shields MD  •  glucagon (human recombinant) (GLUCAGEN DIAGNOSTIC) injection 1 mg, 1 mg, Subcutaneous, Q15 Min PRN, Jony Shields MD  •  glucagon (human recombinant) (GLUCAGEN DIAGNOSTIC) injection 1 mg, 1 mg, Subcutaneous, Q15 Min PRN, Jony Shields MD  •  insulin regular (humuLIN R,novoLIN R) injection 0-9 Units, 0-9 Units, Subcutaneous, Q6H, Jony Shields MD, 2 Units at 02/28/21 1818  •  ipratropium-albuterol (DUO-NEB) nebulizer solution 3 mL, 3 mL, Nebulization, Q4H PRN, Jnoy Shields MD  •  lactobacillus acidophilus (RISAQUAD) capsule 1 capsule, 1 capsule, Oral, BID, Jony Shields MD, 1 capsule at 02/28/21 2003  •  lansoprazole (FIRST) oral suspension 30 mg, 30 mg, Per G Tube, QAM, Jony Shields MD, 30 mg at 02/28/21 0610  •  melatonin tablet 3 mg, 3 mg, Per G Tube, Daily With Dinner, Jony Shields MD, 3 mg at 02/28/21 1818  •  OLANZapine (zyPREXA) injection 5 mg, 5 mg, Intramuscular, Q8H PRN, Osvaldo Simons MD  •  ondansetron (ZOFRAN) injection 4 mg, 4 mg, Intravenous, Q6H PRN, Jony Shields MD  •  polyethylene glycol (MIRALAX) packet 17 g, 17 g, Oral, Daily PRN, Osvaldo Simons  MD Raghavendra  •  sodium chloride 0.9 % bolus 500 mL, 500 mL, Intravenous, Once, Jony Shields MD, Stopped at 02/24/21 1847  •  [COMPLETED] Insert peripheral IV, , , Once **AND** sodium chloride 0.9 % flush 10 mL, 10 mL, Intravenous, PRN, Jony Shields MD, 10 mL at 02/26/21 2111              Patient Active Problem List   Diagnosis   • Gastrointestinal hemorrhage   • Clostridium difficile enterocolitis   • Dysphagia due to old stroke   • Type 2 diabetes mellitus with other specified complication (CMS/HCC)   • Long term current use of anticoagulant therapy   • Severe malnutrition (CMS/HCC)   • Sepsis (CMS/HCC)   • GERD (gastroesophageal reflux disease)   • Hemiparesis affecting right side as late effect of cerebrovascular accident (CMS/HCC)   • Metabolic encephalopathy   • Pulmonary venous congestion   • Abnormal urinalysis   • Type 2 diabetes mellitus with hyperglycemia (CMS/HCC)   • Deviation of right eye   • Hypertension   • Hyperlipidemia   • On tube feeding diet   • PEG (percutaneous endoscopic gastrostomy) status (CMS/[facility])   • E coli bacteremia   • Left ureteral stone   The patient with hypernatremia secondary to free water loss and inaccessibility to water will check urine sodium and osmolarity start patient on free water through PEG tube of 250 cc every 4 hours will replace other electrolyte deficiencies including potassium and phosphate also check magnesium.  Yue Sanchez MD [facility], FASN.  02/28/21  21:47 EST

## 2021-03-01 NOTE — PROGRESS NOTES
"   LOS: 5 days   Patient Care Team:  Enrique Carver MD as PCP - General (Family Medicine)      Subjective   Interval History: Nonverbal, but no apparent distress.    Objective     ROS   12 POINT NEG ROS PERTINENT IN HPI      Vital Signs  Temp:  [97.5 °F (36.4 °C)-98.4 °F (36.9 °C)] 98.4 °F (36.9 °C)  Heart Rate:  [68-81] 81  Resp:  [20] 20  BP: (106-162)/(75-96) 162/94      Intake/Output Summary (Last 24 hours) at 3/1/2021 0642  Last data filed at 2/28/2021 2151  Gross per 24 hour   Intake 817 ml   Output 850 ml   Net -33 ml       Flowsheet Rows      First Filed Value   Admission Height  172.7 cm (68\") Documented at 02/24/2021 0705   Admission Weight  44 kg (97 lb) Documented at 02/24/2021 0705          Physical Exam:     General matt: alert, nonverbal. Eye contact but no verbal response  ABD:  soft        Results Review:     I reviewed the patient's new clinical results.  Lab Results (all)     Procedure Component Value Units Date/Time    POC Glucose Once [[id number]]  (Abnormal) Collected: 03/01/21 0622    Specimen: Blood Updated: 03/01/21 0623     Glucose 150 mg/dL     Blood Culture - Blood, Arm, Left [[id number]] Collected: 02/26/21 0459    Specimen: Blood from Arm, Left Updated: 03/01/21 0515     Blood Culture No growth at 3 days    Blood Culture - Blood, Arm, Left [[id number]] Collected: 02/26/21 0412    Specimen: Blood from Arm, Left Updated: 03/01/21 0500     Blood Culture No growth at 3 days    POC Glucose Once [[id number]]  (Normal) Collected: 03/01/21 0011    Specimen: Blood Updated: 03/01/21 0013     Glucose 130 mg/dL     POC Glucose Once [[id number]]  (Abnormal) Collected: 02/28/21 1804    Specimen: Blood Updated: 02/28/21 1806     Glucose 178 mg/dL     POC Glucose Once [[id number]]  (Abnormal) Collected: 02/28/21 1200    Specimen: Blood Updated: 02/28/21 1204     Glucose 144 mg/dL     Phosphorus [[id number]]  (Abnormal) Collected: 02/28/21 0659    Specimen: Blood Updated: 02/28/21 0812     Phosphorus " 1.7 mg/dL     Comprehensive Metabolic Panel [[id number]]  (Abnormal) Collected: 02/28/21 0659    Specimen: Blood Updated: 02/28/21 0801     Glucose 162 mg/dL      BUN 26 mg/dL      Creatinine 0.70 mg/dL      Sodium 151 mmol/L      Potassium 3.2 mmol/L      Chloride 116 mmol/L      CO2 24.9 mmol/L      Calcium 8.4 mg/dL      Total Protein 5.8 g/dL      Albumin 2.90 g/dL      ALT (SGPT) 24 U/L      AST (SGOT) 30 U/L      Alkaline Phosphatase 81 U/L      Total Bilirubin 0.2 mg/dL      eGFR  African Amer 133 mL/min/1.73      Globulin 2.9 gm/dL      A/G Ratio 1.0 g/dL      BUN/Creatinine Ratio 37.1     Anion Gap 10.1 mmol/L     Narrative:      GFR Normal >60  Chronic Kidney Disease <60  Kidney Failure <15      Magnesium [[id number]]  (Normal) Collected: 02/28/21 0659    Specimen: Blood Updated: 02/28/21 0758     Magnesium 2.0 mg/dL     CBC (No Diff) [[id number]]  (Abnormal) Collected: 02/28/21 0659    Specimen: Blood Updated: 02/28/21 0742     WBC 10.86 10*3/mm3      RBC 4.34 10*6/mm3      Hemoglobin 11.7 g/dL      Hematocrit 36.0 %      MCV 82.9 fL      MCH 27.0 pg      MCHC 32.5 g/dL      RDW 13.7 %      RDW-SD 41.0 fl      MPV 13.3 fL      Platelets 163 10*3/mm3     POC Glucose Once [[id number]]  (Abnormal) Collected: 02/28/21 0634    Specimen: Blood Updated: 02/28/21 0636     Glucose 168 mg/dL     Blood Culture - Blood, Arm, Left [[id number]]  (Abnormal)  (Susceptibility) Collected: 02/24/21 0607    Specimen: Blood from Arm, Left Updated: 02/28/21 0612     Blood Culture Escherichia coli     Isolated from Anaerobic Bottle     Blood Culture Proteus mirabilis     Isolated from Anaerobic Bottle     Gram Stain Anaerobic Bottle Gram negative bacilli    Narrative:      2/27 Dr. Guajardo requested Cefazolin on both organisms    Susceptibility      Escherichia coli     ART Not Specified     Ampicillin Resistant      Ampicillin + Sulbactam Resistant      Cefazolin  Resistant     Cefepime Susceptible      Ceftazidime Resistant       Ceftriaxone Resistant      Gentamicin Susceptible      Levofloxacin Resistant      Piperacillin + Tazobactam Susceptible      Trimethoprim + Sulfamethoxazole Resistant                   Susceptibility      Proteus mirabilis     ART Not Specified     Ampicillin Susceptible      Ampicillin + Sulbactam Susceptible      Cefazolin  Intermediate     Cefepime Susceptible      Ceftazidime Susceptible      Ceftriaxone Susceptible      Gentamicin Susceptible      Levofloxacin Intermediate      Piperacillin + Tazobactam Susceptible      Trimethoprim + Sulfamethoxazole Susceptible                   Susceptibility Comments     Escherichia coli    Cefazolin sensitivity will not be reported for Enterobacteriaceae in non-urine isolates. If cefazolin is preferred, please call the microbiology lab to request an E-test.  With the exception of urinary-sourced infections, aminoglycosides should not be used as monotherapy.    Proteus mirabilis    Cefazolin sensitivity will not be reported for Enterobacteriaceae in non-urine isolates. If cefazolin is preferred, please call the microbiology lab to request an E-test.  With the exception of urinary-sourced infections, aminoglycosides should not be used as monotherapy.             POC Glucose Once [[id number]]  (Abnormal) Collected: 02/27/21 2347    Specimen: Blood Updated: 02/27/21 2348     Glucose 146 mg/dL     POC Glucose Once [[id number]]  (Abnormal) Collected: 02/27/21 1740    Specimen: Blood Updated: 02/27/21 1742     Glucose 135 mg/dL     POC Glucose Once [[id number]]  (Abnormal) Collected: 02/27/21 1155    Specimen: Blood Updated: 02/27/21 1202     Glucose 162 mg/dL     POC Glucose Once [[id number]]  (Abnormal) Collected: 02/27/21 0627    Specimen: Blood Updated: 02/27/21 0630     Glucose 148 mg/dL     Blood Culture - Blood, Arm, Left [[id number]]  (Abnormal) Collected: 02/24/21 0607    Specimen: Blood from Arm, Left Updated: 02/27/21 0627     Blood Culture Escherichia coli     Isolated  from Aerobic Bottle     Blood Culture Proteus species     Comment: Appended report. These results have been appended to a previously final verified report.        Isolated from --     Gram Stain Aerobic Bottle Gram negative bacilli      Anaerobic Bottle Gram negative bacilli    Narrative:      Refer to previous blood culture collected on 2/24/2021 at 0607.      POC Glucose Once [[id number]]  (Abnormal) Collected: 02/27/21 0012    Specimen: Blood Updated: 02/27/21 0013     Glucose 144 mg/dL     POC Glucose Once [[id number]]  (Abnormal) Collected: 02/26/21 1736    Specimen: Blood Updated: 02/26/21 1743     Glucose 134 mg/dL     POC Glucose Once [[id number]]  (Abnormal) Collected: 02/26/21 1107    Specimen: Blood Updated: 02/26/21 1109     Glucose 134 mg/dL     Blood Culture ID, PCR - Blood, Arm, Left [[id number]]  (Abnormal) Collected: 02/24/21 0607    Specimen: Blood from Arm, Left Updated: 02/26/21 0815     BCID, PCR Escherichia coli. Identification by BCID PCR.     BCID, PCR 2 Proteus spp. Identification by BCID PCR.    Basic Metabolic Panel [[id number]]  (Abnormal) Collected: 02/26/21 0412    Specimen: Blood Updated: 02/26/21 0545     Glucose 105 mg/dL      BUN 32 mg/dL      Creatinine 0.87 mg/dL      Sodium 147 mmol/L      Potassium 3.8 mmol/L      Chloride 118 mmol/L      CO2 22.4 mmol/L      Calcium 8.6 mg/dL      eGFR  African Amer 103 mL/min/1.73      BUN/Creatinine Ratio 36.8     Anion Gap 6.6 mmol/L     Narrative:      GFR Normal >60  Chronic Kidney Disease <60  Kidney Failure <15      CBC (No Diff) [[id number]]  (Abnormal) Collected: 02/26/21 0412    Specimen: Blood Updated: 02/26/21 0517     WBC 25.74 10*3/mm3      RBC 3.86 10*6/mm3      Hemoglobin 10.0 g/dL      Hematocrit 30.7 %      MCV 79.5 fL      MCH 25.9 pg      MCHC 32.6 g/dL      RDW 13.7 %      RDW-SD 39.4 fl      MPV 13.6 fL      Platelets 156 10*3/mm3     POC Glucose Once [[id number]]  (Normal) Collected: 02/26/21 0438    Specimen: Blood Updated:  02/26/21 0441     Glucose 97 mg/dL     Lactic Acid, Reflex [[id number]]  (Normal) Collected: 02/26/21 0236    Specimen: Blood Updated: 02/26/21 0354     Lactate 1.2 mmol/L     Lactic Acid, Reflex Timer (This will reflex a repeat order 3-3:15 hours after ordered.) [[id number]] Collected: 02/25/21 2029    Specimen: Blood Updated: 02/26/21 0100     Hold Tube Hold for add-ons.     Comment: Auto resulted.       POC Glucose Once [[id number]]  (Normal) Collected: 02/25/21 2352    Specimen: Blood Updated: 02/25/21 2353     Glucose 127 mg/dL     Lactic Acid, Plasma [[id number]]  (Abnormal) Collected: 02/25/21 2029    Specimen: Blood Updated: 02/25/21 2146     Lactate 2.5 mmol/L     POC Glucose Once [[id number]]  (Normal) Collected: 02/25/21 2004    Specimen: Blood Updated: 02/25/21 2005     Glucose 109 mg/dL     POC Glucose Once [[id number]]  (Normal) Collected: 02/25/21 1828    Specimen: Blood Updated: 02/25/21 1829     Glucose 115 mg/dL     POC Glucose Once [[id number]]  (Abnormal) Collected: 02/25/21 1746    Specimen: Blood Updated: 02/25/21 1747     Glucose 145 mg/dL     POC Glucose Once [[id number]]  (Normal) Collected: 02/25/21 1722    Specimen: Blood Updated: 02/25/21 1726     Glucose 70 mg/dL     POC Glucose Once [[id number]]  (Abnormal) Collected: 02/25/21 1721    Specimen: Blood Updated: 02/25/21 1722     Glucose 67 mg/dL     Blood Gas, Arterial - [[id number]]  (Abnormal) Collected: 02/25/21 1354    Specimen: Arterial Blood Updated: 02/25/21 1357     Site Arterial: left radial     Ralf's Test Positive     pH, Arterial 7.450 pH units      pCO2, Arterial 26.5 mm Hg      pO2, Arterial 61.1 mm Hg      HCO3, Arterial 18.4 mmol/L      Base Excess, Arterial -4.2 mmol/L      O2 Saturation Calculated 92.6 %      Barometric Pressure for Blood Gas 757.4 mmHg      Modality Cannula     Flow Rate 2 lpm      Rate 26 Breaths/minute     POC Glucose Once [[id number]]  (Normal) Collected: 02/25/21 1341    Specimen: Blood Updated: 02/25/21 1342      Glucose 93 mg/dL     POC Glucose Once [[id number]]  (Normal) Collected: 02/25/21 1155    Specimen: Blood Updated: 02/25/21 1212     Glucose 111 mg/dL     Urine Culture - Urine, Urine, Catheter [[id number]] Collected: 02/24/21 0513    Specimen: Urine, Catheter Updated: 02/25/21 0858     Urine Culture >100,000 CFU/mL Mixed Anabelle Isolated    Narrative:      Specimen contains mixed organisms of questionable pathogenicity which indicates contamination with commensal anabelle.  Further identification is unlikely to provide clinically useful information.  Suggest recollection.    CBC (No Diff) [[id number]]  (Abnormal) Collected: 02/25/21 0406    Specimen: Blood Updated: 02/25/21 0632     WBC 36.99 10*3/mm3      RBC 4.24 10*6/mm3      Hemoglobin 11.5 g/dL      Hematocrit 34.5 %      MCV 81.4 fL      MCH 27.1 pg      MCHC 33.3 g/dL      RDW 13.3 %      RDW-SD 38.6 fl      MPV 13.7 fL      Platelets 179 10*3/mm3     Basic Metabolic Panel [[id number]]  (Abnormal) Collected: 02/25/21 0406    Specimen: Blood Updated: 02/25/21 0621     Glucose 176 mg/dL      BUN 38 mg/dL      Creatinine 1.29 mg/dL      Sodium 141 mmol/L      Potassium 4.5 mmol/L      Chloride 108 mmol/L      CO2 22.7 mmol/L      Calcium 8.7 mg/dL      eGFR   Amer 66 mL/min/1.73      BUN/Creatinine Ratio 29.5     Anion Gap 10.3 mmol/L     Narrative:      GFR Normal >60  Chronic Kidney Disease <60  Kidney Failure <15      Vancomycin, Random [[id number]]  (Normal) Collected: 02/25/21 0406    Specimen: Blood Updated: 02/25/21 0617     Vancomycin Random 7.70 mcg/mL     Narrative:      Therapeutic Ranges for Vancomycin    Vancomycin Random   5.0-40.0 mcg/mL  Vancomycin Trough   5.0-20.0 mcg.mL  Vancomycin Peak     20.0-40.0 mcg/mL    POC Glucose Once [[id number]]  (Abnormal) Collected: 02/25/21 0544    Specimen: Blood Updated: 02/25/21 0555     Glucose 209 mg/dL     POC Glucose Once [[id number]]  (Abnormal) Collected: 02/24/21 2333    Specimen: Blood Updated: 02/24/21  "2344     Glucose 147 mg/dL     POC Glucose Once [[id number]]  (Normal) Collected: 02/24/21 1850    Specimen: Blood Updated: 02/24/21 1851     Glucose 78 mg/dL     Ammonia [[id number]]  (Normal) Collected: 02/24/21 1615    Specimen: Blood Updated: 02/24/21 1703     Ammonia 52 umol/L     Vitamin B12 [[id number]]  (Normal) Collected: 02/24/21 1211    Specimen: Blood Updated: 02/24/21 1614     Vitamin B-12 858 pg/mL     Narrative:      Results may be falsely increased if patient taking Biotin.      Folate [[id number]]  (Normal) Collected: 02/24/21 1211    Specimen: Blood Updated: 02/24/21 1614     Folate 13.50 ng/mL     Narrative:      Results may be falsely increased if patient taking Biotin.      Procalcitonin [[id number]]  (Abnormal) Collected: 02/24/21 1211    Specimen: Blood Updated: 02/24/21 1421     Procalcitonin 22.32 ng/mL     Narrative:      As a Marker for Sepsis (Non-Neonates):   1. <0.5 ng/mL represents a low risk of severe sepsis and/or septic shock.  1. >2 ng/mL represents a high risk of severe sepsis and/or septic shock.    As a Marker for Lower Respiratory Tract Infections that require antibiotic therapy:  PCT on Admission     Antibiotic Therapy             6-12 Hrs later  > 0.5                Strongly Recommended            >0.25 - <0.5         Recommended  0.1 - 0.25           Discouraged                   Remeasure/reassess PCT  <0.1                 Strongly Discouraged          Remeasure/reassess PCT      As 28 day mortality risk marker: \"Change in Procalcitonin Result\" (> 80 % or <=80 %) if Day 0 (or Day 1) and Day 4 values are available. Refer to http://www.[facility]-pct-calculator.com/   Change in PCT <=80 %   A decrease of PCT levels below or equal to 80 % defines a positive change in PCT test result representing a higher risk for 28-day all-cause mortality of patients diagnosed with severe sepsis or septic shock.  Change in PCT > 80 %   A decrease of PCT levels of more than 80 % defines a negative " change in PCT result representing a lower risk for 28-day all-cause mortality of patients diagnosed with severe sepsis or septic shock.                Results may be falsely decreased if patient taking Biotin.     MRSA Screen, PCR (Inpatient) - Swab, Nares [[id number]]  (Abnormal) Collected: 02/24/21 1214    Specimen: Swab from Nares Updated: 02/24/21 1356     MRSA PCR MRSA Detected    BNP [[id number]]  (Abnormal) Collected: 02/24/21 0607    Specimen: Blood Updated: 02/24/21 1253     proBNP 1,998.0 pg/mL     Narrative:      Among patients with dyspnea, NT-proBNP is highly sensitive for the detection of acute congestive heart failure. In addition NT-proBNP of <300 pg/ml effectively rules out acute congestive heart failure with 99% negative predictive value.    Results may be falsely decreased if patient taking Biotin.      COVID PRE-OP / PRE-PROCEDURE SCREENING ORDER (NO ISOLATION) - Swab, Nasopharynx [[id number]]  (Normal) Collected: 02/24/21 0639    Specimen: Swab from Nasopharynx Updated: 02/24/21 1250    Narrative:      The following orders were created for panel order COVID PRE-OP / PRE-PROCEDURE SCREENING ORDER (NO ISOLATION) - Swab, Nasopharynx.  Procedure                               Abnormality         Status                     ---------                               -----------         ------                     COVID-19,APTIMA PANTHER,...[[id number]]  Normal              Final result                 Please view results for these tests on the individual orders.    COVID-19,APTIMA PANTHERLINH IN-HOUSE, NP/OP SWAB IN UTM/VTM/[facility] TRANSPORT MEDIA,24 HR TAT - Swab, Nasopharynx [[id number]]  (Normal) Collected: 02/24/21 0639    Specimen: Swab from Nasopharynx Updated: 02/24/21 1250     COVID19 Not Detected    Narrative:      Fact sheet for providers: https://www.fda.gov/media/556609/download     Fact sheet for patients: https://www.fda.gov/media/725354/download    Test performed by RT PCR.    POC Glucose Once  [[id number]]  (Abnormal) Collected: 02/24/21 1138    Specimen: Blood Updated: 02/24/21 1152     Glucose 190 mg/dL     TSH [[id number]]  (Normal) Collected: 02/24/21 0607    Specimen: Blood Updated: 02/24/21 0652     TSH 0.481 uIU/mL     Comprehensive Metabolic Panel [[id number]]  (Abnormal) Collected: 02/24/21 0607    Specimen: Blood Updated: 02/24/21 0647     Glucose 213 mg/dL      BUN 36 mg/dL      Creatinine 0.95 mg/dL      Sodium 140 mmol/L      Potassium 4.9 mmol/L      Chloride 104 mmol/L      CO2 26.8 mmol/L      Calcium 9.2 mg/dL      Total Protein 6.4 g/dL      Albumin 3.40 g/dL      ALT (SGPT) 29 U/L      AST (SGOT) 24 U/L      Alkaline Phosphatase 57 U/L      Total Bilirubin 0.5 mg/dL      eGFR  African Amer 93 mL/min/1.73      Globulin 3.0 gm/dL      A/G Ratio 1.1 g/dL      BUN/Creatinine Ratio 37.9     Anion Gap 9.2 mmol/L     Narrative:      GFR Normal >60  Chronic Kidney Disease <60  Kidney Failure <15      Lipid Panel [[id number]] Collected: 02/24/21 0607    Specimen: Blood Updated: 02/24/21 0646     Total Cholesterol 80 mg/dL      Triglycerides 58 mg/dL      HDL Cholesterol 51 mg/dL      LDL Cholesterol  15 mg/dL      VLDL Cholesterol 14 mg/dL      LDL/HDL Ratio 0.34    Narrative:      Cholesterol Reference Ranges  (U.S. Department of Health and Human Services ATP III Classifications)    Desirable          <200 mg/dL  Borderline High    200-239 mg/dL  High Risk          >240 mg/dL      Triglyceride Reference Ranges  (U.S. Department of Health and Human Services ATP III Classifications)    Normal           <150 mg/dL  Borderline High  150-199 mg/dL  High             200-499 mg/dL  Very High        >500 mg/dL    HDL Reference Ranges  (U.S. Department of Health and Human Services ATP III Classifcations)    Low     <40 mg/dl (major risk factor for CHD)  High    >60 mg/dl ('negative' risk factor for CHD)        LDL Reference Ranges  (U.S. Department of Health and Human Services ATP III  Classifcations)    Optimal          <100 mg/dL  Near Optimal     100-129 mg/dL  Borderline High  130-159 mg/dL  High             160-189 mg/dL  Very High        >189 mg/dL    Lactic Acid, Plasma [[id number]]  (Normal) Collected: 02/24/21 0607    Specimen: Blood Updated: 02/24/21 0644     Lactate 1.5 mmol/L     Hemoglobin A1c [[id number]]  (Abnormal) Collected: 02/24/21 0607    Specimen: Blood Updated: 02/24/21 0641     Hemoglobin A1C 7.10 %     Narrative:      Hemoglobin A1C Ranges:    Increased Risk for Diabetes  5.7% to 6.4%  Diabetes                     >= 6.5%  Diabetic Goal                < 7.0%    CBC (No Diff) [[id number]]  (Abnormal) Collected: 02/24/21 0607    Specimen: Blood Updated: 02/24/21 0625     WBC 32.83 10*3/mm3      RBC 5.04 10*6/mm3      Hemoglobin 13.5 g/dL      Hematocrit 41.4 %      MCV 82.1 fL      MCH 26.8 pg      MCHC 32.6 g/dL      RDW 13.7 %      RDW-SD 40.3 fl      MPV 12.4 fL      Platelets 247 10*3/mm3     Urinalysis, Microscopic Only - Urine, Catheter [[id number]]  (Abnormal) Collected: 02/24/21 0513    Specimen: Urine, Catheter Updated: 02/24/21 0608     RBC, UA 0-2 /HPF      WBC, UA 3-5 /HPF      Bacteria, UA 4+ /HPF      Squamous Epithelial Cells, UA 0-2 /HPF      Hyaline Casts, UA None Seen /LPF      Triple Phosphate Crystals, UA Moderate/2+ /HPF      Methodology Manual Light Microscopy    Urinalysis With Microscopic If Indicated (No Culture) - Urine, Catheter [[id number]]  (Abnormal) Collected: 02/24/21 0513    Specimen: Urine, Catheter Updated: 02/24/21 0607     Color, UA Yellow     Appearance, UA Cloudy     pH, UA >=9.0     Specific Gravity, UA 1.018     Glucose, UA Negative     Ketones, UA Negative     Bilirubin, UA Negative     Blood, UA Small (1+)     Protein, UA 30 mg/dL (1+)     Leuk Esterase, UA Moderate (2+)     Nitrite, UA Negative     Urobilinogen, UA 0.2 E.U./dL    CBC & Differential [[id number]]  (Abnormal) Collected: 02/24/21 0451    Specimen: Blood Updated: 02/24/21 0542     Narrative:      The following orders were created for panel order CBC & Differential.  Procedure                               Abnormality         Status                     ---------                               -----------         ------                     CBC Auto Differential[[id number]]        Abnormal            Final result                 Please view results for these tests on the individual orders.    CBC Auto Differential [[id number]]  (Abnormal) Collected: 02/24/21 0451    Specimen: Blood Updated: 02/24/21 0542     WBC 35.61 10*3/mm3      RBC 5.05 10*6/mm3      Hemoglobin 13.8 g/dL      Hematocrit 41.8 %      MCV 82.8 fL      MCH 27.3 pg      MCHC 33.0 g/dL      RDW 13.7 %      RDW-SD 40.2 fl      MPV 12.6 fL      Platelets 266 10*3/mm3     Manual Differential [[id number]]  (Abnormal) Collected: 02/24/21 0451    Specimen: Blood Updated: 02/24/21 0542     Neutrophil % 90.0 %      Lymphocyte % 2.0 %      Monocyte % 7.0 %      Basophil % 1.0 %      Neutrophils Absolute 32.05 10*3/mm3      Lymphocytes Absolute 0.71 10*3/mm3      Monocytes Absolute 2.49 10*3/mm3      Basophils Absolute 0.36 10*3/mm3      RBC Morphology Normal     WBC Morphology Normal     Platelet Morphology Normal    Comprehensive Metabolic Panel [[id number]]  (Abnormal) Collected: 02/24/21 0451    Specimen: Blood Updated: 02/24/21 0528     Glucose 226 mg/dL      BUN 34 mg/dL      Creatinine 1.08 mg/dL      Sodium 139 mmol/L      Potassium 5.2 mmol/L      Chloride 103 mmol/L      CO2 26.5 mmol/L      Calcium 9.3 mg/dL      Total Protein 6.5 g/dL      Albumin 3.40 g/dL      ALT (SGPT) 32 U/L      AST (SGOT) 22 U/L      Alkaline Phosphatase 57 U/L      Total Bilirubin 0.6 mg/dL      eGFR   Amer 81 mL/min/1.73      Globulin 3.1 gm/dL      A/G Ratio 1.1 g/dL      BUN/Creatinine Ratio 31.5     Anion Gap 9.5 mmol/L     Narrative:      GFR Normal >60  Chronic Kidney Disease <60  Kidney Failure <15      Troponin [[id number]]  (Normal)  Collected: 02/24/21 0451    Specimen: Blood Updated: 02/24/21 0528     Troponin T 0.012 ng/mL     Narrative:      Troponin T Reference Range:  <= 0.03 ng/mL-   Negative for AMI  >0.03 ng/mL-     Abnormal for myocardial necrosis.  Clinicians would have to utilize clinical acumen, EKG, Troponin and serial changes to determine if it is an Acute Myocardial Infarction or myocardial injury due to an underlying chronic condition.       Results may be falsely decreased if patient taking Biotin.      Protime-INR [[id number]]  (Abnormal) Collected: 02/24/21 0451    Specimen: Blood Updated: 02/24/21 0522     Protime 16.5 Seconds      INR 1.35    aPTT [[id number]]  (Normal) Collected: 02/24/21 0451    Specimen: Blood Updated: 02/24/21 0522     PTT 30.6 seconds     POC Glucose Once [[id number]]  (Abnormal) Collected: 02/24/21 0449    Specimen: Blood Updated: 02/24/21 0450     Glucose 208 mg/dL           Imaging Results (All)     Procedure Component Value Units Date/Time    CT Abdomen Pelvis Without Contrast [[id number]] Collected: 02/25/21 1159     Updated: 02/26/21 1114    Narrative:      CT ABDOMEN AND PELVIS WITHOUT CONTRAST     HISTORY: Recent sonogram demonstrated hydronephrosis, for evaluation.     TECHNIQUE: Axial CT images of the abdomen and pelvis were obtained  without administration of intravenous contrast. The patient was not  given oral contrast. Coronal and sagittal reformats were obtained.     COMPARISON: Ultrasound kidneys from 02/25/2021.     FINDINGS: The patient was extremely agitated and uncooperative. There is  extensive streak and motion artifact throughout the study. Retained  contrast is seen within the left kidney. The nephrogram is patchy with  areas of low density scattered throughout but particularly prominent  within the lower pole. There is dilatation of the left intrarenal  collecting system with contrast seen within the dilated system and  proximal ureter to the level of a 8 x 4 mm (measurement is  approximate  secondary to extensive motion at this level) calculus within the left  midureter as seen on image 78 series 2. A small amount of contrast is  seen within the ureter distally. The urinary bladder is partially  distended, trabeculated and contains contrast. Dystrophic calcifications  are seen within the prostate gland. A midline calcification demonstrated  on image 136 that may be prostatic or represent a calculus as well.  There is no left-sided hydronephrosis. Tiny amount of contrast is seen  within the right renal collecting system and the ureter.     Limited evaluation of the liver, spleen, pancreas and gallbladder does  not reveal any acute abnormality. The left adrenal gland is thickened.  The right adrenal gland is normal. There is a percutaneous gastrostomy  tube in place. No evidence of bowel obstruction. Moderate to large  amount of stool is seen within the colon suggestive of constipation.  Moderate calcified atherosclerotic plaque is seen throughout the  abdominal aorta and its branches. Ectasia of the distal abdominal aorta  measuring up to 2.5 cm. Dilated left common iliac artery measuring up to  1.8 cm. L5-S1 degenerative disc disease with vacuum disc phenomena.       Impression:      Limited study as the patient was extremely agitated and  uncooperative.  1. Left mid ureteric calculus with left moderate hydroureteronephrosis.  Retained contrast within the left kidney demonstrates a patchy  distribution and is concerning for superimposed acute pyelonephritis.  2. CT findings of constipation.  3. Additional findings as above.     These findings were discussed with patient's nurse Sunni at the time  of dictation.     Radiation dose reduction techniques were utilized, including automated  exposure control and exposure modulation based on body size.     This report was finalized on 2/26/2021 11:11 AM by Dr. Shaw Beck M.D.       XR Chest 1 View [[id number]] Collected: 02/26/21 7201      Updated: 02/26/21 0416    Narrative:      SINGLE VIEW OF THE CHEST     HISTORY: Edema     COMPARISON: 02/25/2021     FINDINGS:  Cardiomegaly is present. There is tortuosity of the thoracic aorta.  Vascular congestion is again noted. It has worsened compared to prior  exam. No pneumothorax or pleural effusion is identified.       Impression:      Worsening edema when compared to prior exam.     This report was finalized on 2/26/2021 4:12 AM by Dr. Rolanda Almaguer M.D.       XR Chest 1 View [[id number]] Collected: 02/25/21 1459     Updated: 02/25/21 2057    Narrative:      EXAMINATION: SINGLE VIEW CHEST RADIOGRAPH     HISTORY: Respiratory distress.     FINDINGS: An upright AP portable chest radiograph was obtained.  Comparison is made to a chest radiograph dated 02/24/2021. There is mild  interstitial prominence seen throughout both lungs that is not  significantly changed when compared to the prior examination. The  cardiomediastinal silhouette is within normal limits. Visualized osseous  structures appear normal.       Impression:      Stable mild scattered interstitial opacification throughout  both lungs suggestive of mild interstitial edema.     This report was finalized on 2/25/2021 8:54 PM by Dr. Dion Bustos M.D.       FL Retrograde Pyelogram In OR [[id number]] Collected: 02/25/21 1655     Updated: 02/25/21 1659    Narrative:      FL RETROGRADE PYELOGRAM IN OR-     INDICATIONS: Left ureteral stone, retrograde pyelography, ureteral stent  placement     TECHNIQUE: FLUOROSCOPIC ASSISTANCE IN THE OPERATING ROOM.     FINDINGS:     6 intraoperative fluoroscopic spot views were obtained and recorded the  PACS for review, revealing left retrograde pyelography, left ureteral  stent placement. Please see operative report for full details.     Fluoroscopy time: 18 seconds       Impression:         As described.     This report was finalized on 2/25/2021 4:56 PM by Dr. James Royal M.D.       US Renal  Bilateral [[id number]] Collected: 02/25/21 0756     Updated: 02/25/21 0801    Narrative:      RENAL ULTRASOUND     HISTORY: Sepsis, hydronephrosis     COMPARISON: CT chest 02/24/2021     TECHNIQUE: Real-time ultrasound of the kidneys and bladder was  performed. Static images reviewed.     FINDINGS:  The right kidney measures 10.3 cm in length. The left kidney measures  9.7 cm in length. Renal cortical thickness and echogenicity is normal.  There is a tiny cyst in the right kidney measuring about 8 mm. No  hydronephrosis on the right. Left kidney shows mild hydronephrosis,  similar to the comparison CT. Survey view of the bladder unremarkable.       Impression:      Mild left hydronephrosis, similar to the comparison CT     This report was finalized on 2/25/2021 7:58 AM by Dr. Raghavendra Moran M.D.       CT Angiogram Head [[id number]] Collected: 02/24/21 1601     Updated: 02/24/21 1655    Narrative:      CT ANGIOGRAM HEAD AND NECK WITH CONTRAST     CLINICAL HISTORY: Stroke follow-up.     TECHNIQUE: CT angiogram of the head and neck was obtained with 1 mm  axial images following the administration of IV contrast. Sagittal,  coronal, and 3-dimensional reconstructed images were obtained.  Additionally, a CT scan of the head was obtained with 3 mm axial images  before and after the administration of IV contrast.     Comparison is made to previous CT scan of the head dated 12/16/2019.     FINDINGS:     CTA HEAD: The right vertebral artery is dominant. There is a moderate  degree of stenosis of the V4 segment of the right vertebral artery. The  left vertebral artery is hypoplastic but additionally prominently  stenotic and even perhaps occluded within a portion of the V4 segment.  The basilar artery is unremarkable. The posterior cerebral arteries are  within normal limits. There is a moderate degree of stenosis involving  the cavernous segments of the internal carotid arteries. The left A1  segment is either atretic or  chronically occluded. The middle cerebral  arteries are unremarkable in appearance. The anterior cerebrals are  otherwise within normal limits.     There are very extensive areas of chronic infarction within the brain  parenchyma. Within the lateral aspects of the left frontal, parietal,  temporal, and occipital lobes, there is a large chronic infarct within  the left MCA distribution which measures up to approximately 12.5 x 4.1  cm in greatest axial dimensions. This additionally involves the left  insular cortex and the left external capsule. Also, within the medial  aspect of the left occipital lobe, there are foci of encephalomalacia  measuring up to approximately 2.7 x 1.0 cm in greatest axial dimensions  compatible with a chronic infarct within the left PCA distribution.  Within the posterior aspect of the right occipital lobe, there is a  chronic infarct within the right PCA distribution measuring up to 1.3 x  1.1 cm in greatest axial dimensions. Yet other areas of encephalomalacia  and chronic infarction are identified within the right cerebral  hemisphere involving the lateral aspect of the right parietal lobe  within the right MCA distribution measuring up to approximately 3.4 x  2.7 cm in greatest axial dimensions and the medial aspect of the right  frontal lobe within the right HESHAM distribution. The largest of these  areas of chronic infarction measures up to 1.4 x 1.1 cm in greatest  axial dimensions. Also, extensive areas of chronic infarction are  appreciated within the posterior aspect of the left hippocampus in  addition to the left thalamus within the left PCA distribution. A  subcentimeter hypodensity is appreciated within the posterior aspect of  the left cerebellar hemisphere compatible with a chronic infarct within  the left PICA distribution. When compared to the previous CT examination  of 12/16/2019, there is no convincing evidence for new areas of  infarction. Further evaluation could be  performed with MR imaging for  much more sensitive and specific detection of an area of superimposed  acute infarction, as clinically indicated. No abnormal areas of contrast  enhancement are identified.     CTA NECK: The aortic arch is ectatic measuring up to 3.2 cm in diameter.  The origins of the innominate artery and the left common carotid are not  well evaluated although there appear to be relatively mild degrees of  stenoses within the origins of the innominate and left common carotid.  The right vertebral artery is dominant. The left vertebral artery arises  directly from the aortic arch. The origin of the left vertebral artery  is not well visualized. However, the remaining cervical segment of the  left vertebral artery is unremarkable. The right vertebral artery is  unremarkable in appearance. Atherosclerotic changes are identified  within the right common carotid artery bifurcation and proximal internal  carotid. Within the proximal portion of the right ICA, approximately 1  cm distal to the origin of the right internal carotid artery, there is  an approximate 50% stenosis by NASCET criteria. There is no significant  NASCET stenosis within the proximal portion of the left ICA or the left  common carotid artery bifurcation.     Incidental note is made of extensive emphysematous changes within the  visualized lung apices.     Prominent secretions are appreciated within the piriform sinuses and  secretions are additionally appreciated within the subglottic airway.  Please correlate for the possibility of ongoing aspiration in this  patient.       Impression:      There are very extensive areas of chronic infarction  identified within the both cerebral hemispheres and to a lesser extent  the left cerebellar hemisphere within multiple vascular distributions.  The sizes, locations, and the distributions of these infarcts have been  discussed in detail above.     There is a moderate degree of stenosis involving  the V4 segment of the  right vertebral artery. 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 Moderate degrees of  stenoses are appreciated within the cavernous segments of the internal  carotid arteries. The left A1 segment is either atretic or chronically  occluded.     There is an ectatic appearance of the aortic arch measuring up to 3.2 cm  in diameter. Again, the origins of the innominate artery and left common  carotid artery demonstrate probable mild degrees of stenoses although  the great vessel origins are not well evaluated. Also, the left  vertebral artery arises directly from the aortic arch and its origin is  not well evaluated. However, the remaining portions of the vertebral  arteries are unremarkable.     Approximately 1 cm distal to the origin of the right internal carotid  artery, there is an approximate 50% stenosis by NASCET criteria.     There are prominent secretions identified within the piriform sinuses  and the subglottic airway. Please correlate for the possibility of the  patient experiencing episodes of aspiration.     Emphysematous changes are identified within the visualized lung apices.     Radiation dose reduction techniques were utilized, including automated  exposure control and exposure modulation based on body size.     This report was finalized on 2/24/2021 4:52 PM by Dr. Josse Chino M.D.       CT Angiogram Neck [[id number]] Collected: 02/24/21 1601     Updated: 02/24/21 1655    Narrative:      CT ANGIOGRAM HEAD AND NECK WITH CONTRAST     CLINICAL HISTORY: Stroke follow-up.     TECHNIQUE: CT angiogram of the head and neck was obtained with 1 mm  axial images following the administration of IV contrast. Sagittal,  coronal, and 3-dimensional reconstructed images were obtained.  Additionally, a CT scan  of the head was obtained with 3 mm axial images  before and after the administration of IV contrast.     Comparison is made to previous CT scan of the head dated 12/16/2019.     FINDINGS:     CTA HEAD: The right vertebral artery is dominant. There is a moderate  degree of stenosis of the V4 segment of the right vertebral artery. The  left vertebral artery is hypoplastic but additionally prominently  stenotic and even perhaps occluded within a portion of the V4 segment.  The basilar artery is unremarkable. The posterior cerebral arteries are  within normal limits. There is a moderate degree of stenosis involving  the cavernous segments of the internal carotid arteries. The left A1  segment is either atretic or chronically occluded. The middle cerebral  arteries are unremarkable in appearance. The anterior cerebrals are  otherwise within normal limits.     There are very extensive areas of chronic infarction within the brain  parenchyma. Within the lateral aspects of the left frontal, parietal,  temporal, and occipital lobes, there is a large chronic infarct within  the left MCA distribution which measures up to approximately 12.5 x 4.1  cm in greatest axial dimensions. This additionally involves the left  insular cortex and the left external capsule. Also, within the medial  aspect of the left occipital lobe, there are foci of encephalomalacia  measuring up to approximately 2.7 x 1.0 cm in greatest axial dimensions  compatible with a chronic infarct within the left PCA distribution.  Within the posterior aspect of the right occipital lobe, there is a  chronic infarct within the right PCA distribution measuring up to 1.3 x  1.1 cm in greatest axial dimensions. Yet other areas of encephalomalacia  and chronic infarction are identified within the right cerebral  hemisphere involving the lateral aspect of the right parietal lobe  within the right MCA distribution measuring up to approximately 3.4 x  2.7 cm in greatest  axial dimensions and the medial aspect of the right  frontal lobe within the right HESHAM distribution. The largest of these  areas of chronic infarction measures up to 1.4 x 1.1 cm in greatest  axial dimensions. Also, extensive areas of chronic infarction are  appreciated within the posterior aspect of the left hippocampus in  addition to the left thalamus within the left PCA distribution. A  subcentimeter hypodensity is appreciated within the posterior aspect of  the left cerebellar hemisphere compatible with a chronic infarct within  the left PICA distribution. When compared to the previous CT examination  of 12/16/2019, there is no convincing evidence for new areas of  infarction. Further evaluation could be performed with MR imaging for  much more sensitive and specific detection of an area of superimposed  acute infarction, as clinically indicated. No abnormal areas of contrast  enhancement are identified.     CTA NECK: The aortic arch is ectatic measuring up to 3.2 cm in diameter.  The origins of the innominate artery and the left common carotid are not  well evaluated although there appear to be relatively mild degrees of  stenoses within the origins of the innominate and left common carotid.  The right vertebral artery is dominant. The left vertebral artery arises  directly from the aortic arch. The origin of the left vertebral artery  is not well visualized. However, the remaining cervical segment of the  left vertebral artery is unremarkable. The right vertebral artery is  unremarkable in appearance. Atherosclerotic changes are identified  within the right common carotid artery bifurcation and proximal internal  carotid. Within the proximal portion of the right ICA, approximately 1  cm distal to the origin of the right internal carotid artery, there is  an approximate 50% stenosis by NASCET criteria. There is no significant  NASCET stenosis within the proximal portion of the left ICA or the left  common  carotid artery bifurcation.     Incidental note is made of extensive emphysematous changes within the  visualized lung apices.     Prominent secretions are appreciated within the piriform sinuses and  secretions are additionally appreciated within the subglottic airway.  Please correlate for the possibility of ongoing aspiration in this  patient.       Impression:      There are very extensive areas of chronic infarction  identified within the both cerebral hemispheres and to a lesser extent  the left cerebellar hemisphere within multiple vascular distributions.  The sizes, locations, and the distributions of these infarcts have been  discussed in detail above.     There is a moderate degree of stenosis involving the V4 segment of the  right vertebral artery. 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 Moderate degrees of  stenoses are appreciated within the cavernous segments of the internal  carotid arteries. The left A1 segment is either atretic or chronically  occluded.     There is an ectatic appearance of the aortic arch measuring up to 3.2 cm  in diameter. Again, the origins of the innominate artery and left common  carotid artery demonstrate probable mild degrees of stenoses although  the great vessel origins are not well evaluated. Also, the left  vertebral artery arises directly from the aortic arch and its origin is  not well evaluated. However, the remaining portions of the vertebral  arteries are unremarkable.     Approximately 1 cm distal to the origin of the right internal carotid  artery, there is an approximate 50% stenosis by NASCET criteria.     There are prominent secretions identified within the piriform sinuses  and the subglottic airway. Please correlate for the possibility of the  patient experiencing episodes  of aspiration.     Emphysematous changes are identified within the visualized lung apices.     Radiation dose reduction techniques were utilized, including automated  exposure control and exposure modulation based on body size.     This report was finalized on 2/24/2021 4:52 PM by Dr. Josse Chino M.D.       CT Chest Without Contrast Diagnostic [[id number]] Collected: 02/24/21 1527     Updated: 02/24/21 1546    Narrative:      CT CHEST WITHOUT CONTRAST DIAGNOSTIC-     Radiation dose reduction techniques were utilized, including automated  exposure control and exposure modulation based on body size.     CLINICAL: 74-year-old male with cough, sepsis, question pneumonia.     CT FINDINGS: Linear opacity in left lower lobe costophrenic sulcus  likely represents either discoid atelectasis or scar formation.  Prominence of the interstitium bilaterally with increased septal  markings likely related to vascular congestion, there is cardiac  enlargement however, no pleural effusion. No focal area of consolidation  to suggest underlying pneumonia. Calcified benign granulomas within the  right lung. No suspicious pulmonary mass.     There is atherosclerotic calcification of the aorta. Coronary artery  calcification. No pericardial effusion. The esophagus is satisfactory in  course and caliber. There are calcified benign right hilar and  mediastinal lymph nodes.     Limited images through the upper abdomen demonstrate a G-tube within the  lumen of the stomach. Left kidney is partially within the field-of-view,  there appears to be hydronephrosis. CT scan of the abdomen and pelvis  recommended as follow-up.     CONCLUSION:  1. No infiltrate or consolidation to suggest pneumonia.  2. Increased interstitial pattern with prominent septal markings and  cardiac enlargement, likely vascular congestion.  3. Limited imaging of the left kidney, there appears to be  hydronephrosis. CT scan of the abdomen and pelvis if further  evaluation  is warranted clinically.        This report was finalized on 2/24/2021 3:43 PM by Dr. Gilbert Hutchison M.D.       XR Chest 1 View [[id number]] Collected: 02/24/21 0607     Updated: 02/24/21 0612    Narrative:      XR CHEST 1 VW-     HISTORY:  Altered mental status.     COMPARISON:  Chest radiograph 01/02/2020     FINDINGS:    2 views of the chest were obtained.     Support Devices:  None.  Cardiac Silhouette/Mediastinum/Mesha:  The cardiac silhouette is upper  normal to mildly enlarged, similar to the prior study. The descending  aorta is slightly tortuous, unchanged. There is calcific aortic  atherosclerosis. There are calcified lymph nodes. There is central  pulmonary venous congestion.  Lungs/Pleural Spaces:  There is mild bibasilar atelectasis. Pleural  spaces are clear.  Chest Wall/Diaphragm/Upper Abdomen:  There are old rib fractures.     This report was finalized on 2/24/2021 6:09 AM by Dr. Allison Sabillon M.D.       CT Head Without Contrast [[id number]] Collected: 02/24/21 0525     Updated: 02/24/21 0531    Narrative:      CT OF THE HEAD WITHOUT CONTRAST     HISTORY: Decreased responsiveness     COMPARISON: 12/16/2019     TECHNIQUE: Axial CT imaging was obtained through the brain. No IV  contrast was administered.     FINDINGS:  No acute intracranial hemorrhage is seen. There is diffuse atrophy.  There is extensive periventricular and deep white matter  microangiopathic change. Large areas of encephalomalacia are seen  throughout the left cerebral hemisphere, with additional areas of  encephalomalacia seen within the right MCA territory. This appearance is  stable when compared to prior exam. No definite new areas of decreased  attenuation are seen. There is no midline shift or mass effect. The  paranasal sinuses and mastoid air cells appear clear.       Impression:      No acute intracranial findings.     Radiation dose reduction techniques were utilized, including automated  exposure control and  exposure modulation based on body size.     This report was finalized on 2/24/2021 5:28 AM by Dr. Rolanda Almaguer M.D.             Medication Review:   Current Facility-Administered Medications   Medication Dose Route Frequency Provider Last Rate Last Admin   • acetaminophen (TYLENOL) tablet 650 mg  650 mg Oral Q4H PRN Jony Shields MD   650 mg at 02/25/21 2102    Or   • acetaminophen (TYLENOL) suppository 650 mg  650 mg Rectal Q4H PRN Jony Shields MD       • aspirin tablet 325 mg  325 mg Oral Daily Jony Shields MD   325 mg at 02/28/21 0918    Or   • aspirin suppository 300 mg  300 mg Rectal Daily Jony Shields MD       • bisacodyl (DULCOLAX) suppository 10 mg  10 mg Rectal Daily PRN Jony Shields MD       • cefepime (MAXIPIME) 2 g/100 mL 0.9% NS (mbp)  2 g Intravenous Q12H Hanna Guajardo MD   2 g at 02/28/21 2003   • dextrose (D50W) 25 g/ 50mL Intravenous Solution 25 g  25 g Intravenous Q15 Min PRN Jony Shiedls MD       • dextrose (D50W) 25 g/ 50mL Intravenous Solution 25 g  25 g Intravenous Q15 Min PRN Jony Shields MD   25 g at 02/25/21 1728   • dextrose (D5W) 5 % infusion  100 mL/hr Intravenous Continuous Osvaldo Simons  mL/hr at 02/28/21 1134 100 mL/hr at 02/28/21 1134   • dextrose (GLUTOSE) oral gel 15 g  15 g Oral Q15 Min PRN Jony Shields MD       • dextrose (GLUTOSE) oral gel 15 g  15 g Oral Q15 Min PRN Jony Shields MD       • glucagon (human recombinant) (GLUCAGEN DIAGNOSTIC) injection 1 mg  1 mg Subcutaneous Q15 Min PRN Jony Shields MD       • glucagon (human recombinant) (GLUCAGEN DIAGNOSTIC) injection 1 mg  1 mg Subcutaneous Q15 Min PRN Jony Shields MD       • insulin regular (humuLIN R,novoLIN R) injection 0-9 Units  0-9 Units Subcutaneous Q6H Jony Shields MD   2 Units at 03/01/21 0628   • ipratropium-albuterol (DUO-NEB) nebulizer solution 3 mL  3 mL Nebulization Q4H PRN Jony Shields MD       • lactobacillus acidophilus (RISAQUAD) capsule 1 capsule  1  capsule Oral BID Jony Shields MD   1 capsule at 02/28/21 2003   • lansoprazole (FIRST) oral suspension 30 mg  30 mg Per G Tube QAM Jony Shields MD   30 mg at 03/01/21 0628   • melatonin tablet 3 mg  3 mg Per G Tube Daily With Dinner Jony Shields MD   3 mg at 02/28/21 1818   • OLANZapine (zyPREXA) injection 5 mg  5 mg Intramuscular Q8H PRN Osvaldo Simons MD       • ondansetron (ZOFRAN) injection 4 mg  4 mg Intravenous Q6H PRN Jony Shields MD       • polyethylene glycol (MIRALAX) packet 17 g  17 g Oral Daily PRN Osvaldo Simons MD       • sodium chloride 0.9 % bolus 500 mL  500 mL Intravenous Once Jony Shields MD   Stopped at 02/24/21 1847   • sodium chloride 0.9 % flush 10 mL  10 mL Intravenous PRN Jony Shields MD   10 mL at 02/26/21 2111       Current Facility-Administered Medications:   •  acetaminophen (TYLENOL) tablet 650 mg, 650 mg, Oral, Q4H PRN, 650 mg at 02/25/21 2102 **OR** acetaminophen (TYLENOL) suppository 650 mg, 650 mg, Rectal, Q4H PRN, Jony Shields MD  •  aspirin tablet 325 mg, 325 mg, Oral, Daily, 325 mg at 02/28/21 0918 **OR** aspirin suppository 300 mg, 300 mg, Rectal, Daily, Jony Shields MD  •  bisacodyl (DULCOLAX) suppository 10 mg, 10 mg, Rectal, Daily PRN, Jony Shields MD  •  cefepime (MAXIPIME) 2 g/100 mL 0.9% NS (mbp), 2 g, Intravenous, Q12H, Hanna Guajardo MD, 2 g at 02/28/21 2003  •  dextrose (D50W) 25 g/ 50mL Intravenous Solution 25 g, 25 g, Intravenous, Q15 Min PRN, Jony Shields MD  •  dextrose (D50W) 25 g/ 50mL Intravenous Solution 25 g, 25 g, Intravenous, Q15 Min PRN, Jony Shields MD, 25 g at 02/25/21 1728  •  dextrose (D5W) 5 % infusion, 100 mL/hr, Intravenous, Continuous, Osvaldo Simons MD, Last Rate: 100 mL/hr at 02/28/21 1134, 100 mL/hr at 02/28/21 1134  •  dextrose (GLUTOSE) oral gel 15 g, 15 g, Oral, Q15 Min PRN, Jony Shields MD  •  dextrose (GLUTOSE) oral gel 15 g, 15 g, Oral, Q15 Min PRN, Jony Shields MD  •  glucagon (human  recombinant) (GLUCAGEN DIAGNOSTIC) injection 1 mg, 1 mg, Subcutaneous, Q15 Min PRN, Jony Shields MD  •  glucagon (human recombinant) (GLUCAGEN DIAGNOSTIC) injection 1 mg, 1 mg, Subcutaneous, Q15 Min PRN, Jony Shields MD  •  insulin regular (humuLIN R,novoLIN R) injection 0-9 Units, 0-9 Units, Subcutaneous, Q6H, Jony Shields MD, 2 Units at 03/01/21 0628  •  ipratropium-albuterol (DUO-NEB) nebulizer solution 3 mL, 3 mL, Nebulization, Q4H PRN, Jony Shields MD  •  lactobacillus acidophilus (RISAQUAD) capsule 1 capsule, 1 capsule, Oral, BID, Jony Shields MD, 1 capsule at 02/28/21 2003  •  lansoprazole (FIRST) oral suspension 30 mg, 30 mg, Per G Tube, QAM, Jony Shields MD, 30 mg at 03/01/21 0628  •  melatonin tablet 3 mg, 3 mg, Per G Tube, Daily With Dinner, Jony Shields MD, 3 mg at 02/28/21 1818  •  OLANZapine (zyPREXA) injection 5 mg, 5 mg, Intramuscular, Q8H PRN, Osvaldo Simons MD  •  ondansetron (ZOFRAN) injection 4 mg, 4 mg, Intravenous, Q6H PRN, Jony Shields MD  •  polyethylene glycol (MIRALAX) packet 17 g, 17 g, Oral, Daily PRN, Osvaldo Simons MD  •  sodium chloride 0.9 % bolus 500 mL, 500 mL, Intravenous, Once, Jony Shields MD, Stopped at 02/24/21 1847  •  [COMPLETED] Insert peripheral IV, , , Once **AND** sodium chloride 0.9 % flush 10 mL, 10 mL, Intravenous, PRN, Jony Shields MD, 10 mL at 02/26/21 2111  Medications Discontinued During This Encounter   Medication Reason   • atorvastatin (LIPITOR) 80 MG tablet *Therapy completed   • collagenase 250 UNIT/GM ointment *Therapy completed   • guaiFENesin (ROBITUSSIN) 100 MG/5ML solution oral solution *Therapy completed   • insulin aspart (novoLOG FLEXPEN) 100 UNIT/ML solution pen-injector sc pen *Therapy completed   • ipratropium-albuterol (DUO-NEB) 0.5-2.5 mg/3 ml nebulizer *Therapy completed   • omeprazole 2 mg/mL in sodium bicarbonate injection 8.4% *Therapy completed   • povidone-iodine (BETADINE) 10 % external solution  *Therapy completed   • sodium hypochlorite (DAKIN'S) 0.125 % topical solution *Therapy completed   • terazosin (HYTRIN) 2 MG capsule *Therapy completed   • atorvastatin (LIPITOR) tablet 80 mg    • Vancomycin Pharmacy Intermittent Dosing    • Pharmacy to dose vancomycin    • sodium chloride 0.9 % infusion    • cefTRIAXone (ROCEPHIN) IVPB 2 g    • sterile water irrigation solution Patient Discharge   • iothalamate (CONRAY) injection Patient Discharge   • sodium chloride 0.9 % flush 3 mL Patient Transfer   • sodium chloride 0.9 % flush 3-10 mL Patient Transfer   • lidocaine PF 1% (XYLOCAINE) injection 0.5 mL Patient Transfer   • fentaNYL citrate (PF) (SUBLIMAZE) injection 50 mcg Patient Transfer   • midazolam (VERSED) injection 0.5 mg Patient Transfer   • midazolam (VERSED) injection 1 mg Patient Transfer   • HYDROcodone-acetaminophen (NORCO) 7.5-325 MG per tablet 1 tablet Patient Transfer   • HYDROmorphone (DILAUDID) injection 0.5 mg Patient Transfer   • oxyCODONE-acetaminophen (PERCOCET) 7.5-325 MG per tablet 1 tablet Patient Transfer   • fentaNYL citrate (PF) (SUBLIMAZE) injection 50 mcg Patient Transfer   • naloxone (NARCAN) injection 0.2 mg Patient Transfer   • flumazenil (ROMAZICON) injection 0.2 mg Patient Transfer   • ondansetron (ZOFRAN) injection 4 mg Patient Transfer   • promethazine (PHENERGAN) suppository 25 mg Patient Transfer   • promethazine (PHENERGAN) tablet 25 mg Patient Transfer   • labetalol (NORMODYNE,TRANDATE) injection 5 mg Patient Transfer   • hydrALAZINE (APRESOLINE) injection 5 mg Patient Transfer   • ePHEDrine injection 5 mg Patient Transfer   • diphenhydrAMINE (BENADRYL) injection 12.5 mg Patient Transfer   • diphenhydrAMINE (BENADRYL) capsule 25 mg Patient Transfer   • phenylephrine (ZARIA-SYNEPHRINE) 50 mg in sodium chloride 0.9 % 250 mL infusion Patient Transfer   • sodium chloride 0.9 % infusion    • sodium chloride 0.9 % infusion    • sodium chloride 0.45 % infusion    • lactated ringers  infusion    • LORazepam (ATIVAN) injection 1 mg    • morphine injection 2 mg    • atorvastatin (LIPITOR) tablet 80 mg    • ertapenem (INVanz) 1 g/100 mL 0.9% NS VTB (mbp)    • cefepime (MAXIPIME) 2 g/100 mL 0.9% NS (mbp)    • polyethylene glycol (MIRALAX) packet 17 g        Assessment/Plan         Dysphagia due to old stroke    Severe malnutrition (CMS/HCC)    Sepsis (CMS/HCC)    GERD (gastroesophageal reflux disease)    Hemiparesis affecting right side as late effect of cerebrovascular accident (CMS/HCC)    Metabolic encephalopathy    Type 2 diabetes mellitus with hyperglycemia (CMS/HCC)    Hypertension    Hyperlipidemia    On tube feeding diet    PEG (percutaneous endoscopic gastrostomy) status (CMS/HCC)    E coli bacteremia    Left ureteral stone        Plan   - Polymicrobial sepsis  - Abx per ID  - will plan definitive stone management in 2 weeks. My  will arrange  - will sign off at this time. Please call with questions or concerns.    Rick Charles Jr., MD  03/01/21  06:42 EST

## 2021-03-01 NOTE — PROGRESS NOTES
"    DAILY PROGRESS NOTE  [facility]    Patient Identification:  Name: Cam Gonsalves  Age: 75 y.o.  Sex: male  :  1945  MRN: [id number]         Primary Care Physician: Enrique Carver MD    Subjective:  Interval History: Aphasic.  Vital signs seem stable with no new fevers.  Loose stools noted but no overwhelming diarrhea.    Objective: No family at bedside.    Scheduled Meds:aspirin, 325 mg, Oral, Daily    Or  aspirin, 300 mg, Rectal, Daily  cefepime, 2 g, Intravenous, Q12H  insulin regular, 0-9 Units, Subcutaneous, Q6H  lactobacillus acidophilus, 1 capsule, Oral, BID  lansoprazole, 30 mg, Per G Tube, QAM  melatonin, 3 mg, Per G Tube, Daily With Dinner  sodium chloride, 500 mL, Intravenous, Once      Continuous Infusions:dextrose, 100 mL/hr, Last Rate: 100 mL/hr (21 1134)        Vital signs in last 24 hours:  Temp:  [97.5 °F (36.4 °C)-98.4 °F (36.9 °C)] 97.8 °F (36.6 °C)  Heart Rate:  [68-81] 71  Resp:  [20] 20  BP: (106-162)/(75-97) 133/97    Intake/Output:    Intake/Output Summary (Last 24 hours) at 3/1/2021 1118  Last data filed at 3/1/2021 0857  Gross per 24 hour   Intake 877 ml   Output 1500 ml   Net -623 ml       Exam:  /97 (BP Location: Left arm, Patient Position: Lying)   Pulse 71   Temp 97.8 °F (36.6 °C) (Oral)   Resp 20   Ht 172.7 cm (67.99\")   Wt 44 kg (97 lb) Comment: not weighed by RD  SpO2 99%   BMI 14.75 kg/m²     General Appearance:  Chronically ill-appearing/aphasic                           Throat:   Oral mucosa pink and dry                           Neck:   No JVD                         Lungs:    Decreased bases coarse at times sounds to auscultation bilaterally, respirations unlabored                           Heart:    Regular rate and rhythm, S1 and S2 normal                  Abdomen:     Soft, nontender, bowel sounds active, PEG                 extremities:   No cyanosis or edema                         Data Review:  Labs in chart were " reviewed.    Assessment:  Active Hospital Problems    Diagnosis  POA   • E coli bacteremia [R78.81, B96.20]  Yes   • Left ureteral stone [N20.1]  Unknown   • Sepsis (CMS/HCC) [A41.9]  Yes   • GERD (gastroesophageal reflux disease) [K21.9]  Yes   • Hemiparesis affecting right side as late effect of cerebrovascular accident (CMS/[facility]) [I69.351]  Not Applicable   • Metabolic encephalopathy [G93.41]  Yes   • Type 2 diabetes mellitus with hyperglycemia (CMS/[facility]) [E11.65]  Yes   • Hypertension [I10]  Yes   • Hyperlipidemia [E78.5]  Yes   • On tube feeding diet [Z78.9]  Yes   • PEG (percutaneous endoscopic gastrostomy) status (CMS/[facility]) [Z93.1]  Not Applicable   • Severe malnutrition (CMS/[facility]) [E43]  Yes   • Dysphagia due to old stroke [I69.391]  Not Applicable      Resolved Hospital Problems   No resolved problems to display.       Plan:    MDR E. coli septicemia secondary to UTI complicated by left-sided hydronephrosis with stone status post stent placement to 2/25 but final cultures ended up not being ESBL etiology and also compounded by pansensitive Proteus mirabilis              -Invanz  now adjusted to cefepime with ID consulted and following with a stop date of 3/11/2021              -Urology consulted and following -plans for definitive stone management in a couple weeks status post treatment of infection              -Probiotic given past history of C. Difficile              -Resulting BC NGTD -pronounced leukocytosis much improved and nearly resolved        History of stroke with right-sided weakness/aphasia/dysphagia with PEG tube in place              -Neuro consulted and appreciate input but no plans for further work-up as current issues made worse by metabolic encephalopathy              -Continue ASA with discontinuation of statin secondary to cachexia              -As needed Zyprexa for agitation no currently resting comfortably with resolving metabolic encephalopathy     ARF secondary to sepsis resolved.   Hypernatremia worsening secondary to lack of free water   -Improvement with D5 water with sodium trended down to 146 and will saline lock IVF since renal has further increased free water to 250 cc every 4 hours               -Hypophosphorus/hypokalemia replacement per renal              -9.3 prealbumin which is a very poor overall clinical indicator     Chest x-ray demonstrates worsening congestion from 2/26/2021              -Pulmonology consulted and signed off   -Remaining stable on room air     HTN stable -previous hypotension resolved     GERD with statin discontinued     DM2 with circulatory disease with an A1c of 7 stable BS     GERD/GI prophylaxis on PPI     MiraLAX for resolving constipation      SCDs for DVT prophylaxis           Disposition -long-term prognosis guarded -I personally would endorse the idea of transitioning towards comfort care.  CODE STATUS conditional DNR after discussing case further with Radha Summers - opal/POA - @ [phone] and she very clearly MDM and states code status is Conditional DNR               -input and assistance appreciated from all involved   -CCP working on disposition needs and we can consider possible discharge tomorrow       Osvaldo Simons MD  3/1/2021  11:18 EST

## 2021-03-01 NOTE — PLAN OF CARE
Goal Outcome Evaluation:      Pt is a dependent long term care resident who will be returning to long term care at discharge, no indication for PT at this time. Spoke to family member in room who confirmed that pt is dependent in mobility

## 2021-03-01 NOTE — PROGRESS NOTES
LOS: 5 days     Chief Complaint/ Reason for encounter: Hyponatremia with hypokalemia  Chief Complaint   Patient presents with   • Altered Mental Status         Subjective   Nonverbal, events noted  Has denied any new complaints, remains on tube feeds which he is tolerating well  On IV antibiotics per infectious disease      Medical history reviewed:  History of Present Illness    Subjective    History taken from: Patient and chart    Vital Signs  Temp:  [97.5 °F (36.4 °C)-98.4 °F (36.9 °C)] 97.8 °F (36.6 °C)  Heart Rate:  [68-81] 71  Resp:  [20] 20  BP: (106-162)/(75-97) 133/97       Wt Readings from Last 1 Encounters:   02/24/21 1151 44 kg (97 lb)   02/24/21 0705 44 kg (97 lb)       Objective    Objective:  General Appearance:  Comfortable, chronically ill-appearing, in no acute distress and not in pain.  Awakens to voice but not oriented  HEENT: Mucous membranes moist, no injury, oropharynx clear  Lungs:  Normal effort and normal respiratory rate.  Breath sounds clear to auscultation.  No  respiratory distress.  No rales, decreased breath sounds or rhonchi.    Heart: Normal rate.  Regular rhythm.  S1 normal.  No murmur.   Abdomen: Abdomen is soft.  Bowel sounds are normal, no abdominal tenderness.  There is no rebound or guarding, PEG tube in place  Extremities: Normal range of motion.  Trace edema of bilateral lower extremities, distal pulses intact  Neurological: No focal motor or sensory deficits, pupils reactive  Skin:  Warm and dry.  No rash or cyanosis.       Results Review:    Intake/Output:     Intake/Output Summary (Last 24 hours) at 3/1/2021 1218  Last data filed at 3/1/2021 1100  Gross per 24 hour   Intake 877 ml   Output 1800 ml   Net -923 ml         DATA:  Radiology and Labs:  The following labs independently reviewed by me. Additional labs ordered for tomorrow a.m.  Interval notes, chart personally reviewed by me.   Old records independently reviewed showing history of dementia and dysphagia on  tube feeds  The following radiologic studies independently viewed by me, findings recent chest x-ray showing some pulmonary edema pattern  New problems include hypokalemia    Risk/ complexity of medical care/ medical decision making moderate risk    Labs:   Recent Results (from the past 24 hour(s))   POC Glucose Once    Collection Time: 02/28/21  6:04 PM    Specimen: Blood   Result Value Ref Range    Glucose 178 (H) 70 - 130 mg/dL   POC Glucose Once    Collection Time: 03/01/21 12:11 AM    Specimen: Blood   Result Value Ref Range    Glucose 130 70 - 130 mg/dL   POC Glucose Once    Collection Time: 03/01/21  6:22 AM    Specimen: Blood   Result Value Ref Range    Glucose 150 (H) 70 - 130 mg/dL   Comprehensive Metabolic Panel    Collection Time: 03/01/21  7:21 AM    Specimen: Hand, Right; Blood   Result Value Ref Range    Glucose 170 (H) 65 - 99 mg/dL    BUN 22 8 - 23 mg/dL    Creatinine 0.62 (L) 0.76 - 1.27 mg/dL    Sodium 146 (H) 136 - 145 mmol/L    Potassium 3.4 (L) 3.5 - 5.2 mmol/L    Chloride 112 (H) 98 - 107 mmol/L    CO2 26.1 22.0 - 29.0 mmol/L    Calcium 8.5 (L) 8.6 - 10.5 mg/dL    Total Protein 5.8 (L) 6.0 - 8.5 g/dL    Albumin 2.90 (L) 3.50 - 5.20 g/dL    ALT (SGPT) 26 1 - 41 U/L    AST (SGOT) 26 1 - 40 U/L    Alkaline Phosphatase 72 39 - 117 U/L    Total Bilirubin 0.3 0.0 - 1.2 mg/dL    eGFR  African Amer >150 >60 mL/min/1.73    Globulin 2.9 gm/dL    A/G Ratio 1.0 g/dL    BUN/Creatinine Ratio 35.5 (H) 7.0 - 25.0    Anion Gap 7.9 5.0 - 15.0 mmol/L   Phosphorus    Collection Time: 03/01/21  7:21 AM    Specimen: Hand, Right; Blood   Result Value Ref Range    Phosphorus 2.2 (L) 2.5 - 4.5 mg/dL   CBC (No Diff)    Collection Time: 03/01/21  7:21 AM    Specimen: Hand, Right; Blood   Result Value Ref Range    WBC 11.01 (H) 3.40 - 10.80 10*3/mm3    RBC 4.37 4.14 - 5.80 10*6/mm3    Hemoglobin 11.2 (L) 13.0 - 17.7 g/dL    Hematocrit 34.7 (L) 37.5 - 51.0 %    MCV 79.4 79.0 - 97.0 fL    MCH 25.6 (L) 26.6 - 33.0 pg     MCHC 32.3 31.5 - 35.7 g/dL    RDW 13.9 12.3 - 15.4 %    RDW-SD 39.8 37.0 - 54.0 fl    MPV 12.6 (H) 6.0 - 12.0 fL    Platelets 187 140 - 450 10*3/mm3   Prealbumin    Collection Time: 03/01/21  7:21 AM    Specimen: Hand, Right; Blood   Result Value Ref Range    Prealbumin 9.3 (L) 20.0 - 40.0 mg/dL   Magnesium    Collection Time: 03/01/21  7:21 AM    Specimen: Hand, Right; Blood   Result Value Ref Range    Magnesium 1.9 1.6 - 2.4 mg/dL   Sodium, Urine, Random - Urine, Clean Catch    Collection Time: 03/01/21  8:41 AM    Specimen: Urine, Clean Catch   Result Value Ref Range    Sodium, Urine 91 mmol/L   Osmolality, Urine - Urine, Clean Catch    Collection Time: 03/01/21  8:41 AM    Specimen: Urine, Clean Catch   Result Value Ref Range    Osmolality, Urine 456 mOsm/kg   POC Glucose Once    Collection Time: 03/01/21 11:40 AM    Specimen: Blood   Result Value Ref Range    Glucose 159 (H) 70 - 130 mg/dL       Radiology:  Imaging Results (Last 24 Hours)     ** No results found for the last 24 hours. **             Medications have been reviewed:  Current Facility-Administered Medications   Medication Dose Route Frequency Provider Last Rate Last Admin   • acetaminophen (TYLENOL) tablet 650 mg  650 mg Oral Q4H PRN Jony Shields MD   650 mg at 02/25/21 2102    Or   • acetaminophen (TYLENOL) suppository 650 mg  650 mg Rectal Q4H PRN Jony Shields MD       • aspirin tablet 325 mg  325 mg Oral Daily Jony Shields MD   325 mg at 03/01/21 0841    Or   • aspirin suppository 300 mg  300 mg Rectal Daily Jony Shields MD       • bisacodyl (DULCOLAX) suppository 10 mg  10 mg Rectal Daily PRN Jony Shields MD       • cefepime (MAXIPIME) 2 g/100 mL 0.9% NS (mbp)  2 g Intravenous Q12H Hanna Guajardo MD   2 g at 03/01/21 0842   • dextrose (D50W) 25 g/ 50mL Intravenous Solution 25 g  25 g Intravenous Q15 Min PRN Jony Shields MD       • dextrose (D50W) 25 g/ 50mL Intravenous Solution 25 g  25 g Intravenous Q15 Min PRN Alyson,  MD Jony   25 g at 02/25/21 1728   • dextrose (GLUTOSE) oral gel 15 g  15 g Oral Q15 Min PRN Jony Shields MD       • dextrose (GLUTOSE) oral gel 15 g  15 g Oral Q15 Min PRN Jony Shields MD       • glucagon (human recombinant) (GLUCAGEN DIAGNOSTIC) injection 1 mg  1 mg Subcutaneous Q15 Min PRN Jony Shields MD       • glucagon (human recombinant) (GLUCAGEN DIAGNOSTIC) injection 1 mg  1 mg Subcutaneous Q15 Min PRN Jony Shields MD       • insulin regular (humuLIN R,novoLIN R) injection 0-9 Units  0-9 Units Subcutaneous Q6H Jony Shields MD   2 Units at 03/01/21 1204   • ipratropium-albuterol (DUO-NEB) nebulizer solution 3 mL  3 mL Nebulization Q4H PRN Jony Shields MD       • lactobacillus acidophilus (RISAQUAD) capsule 1 capsule  1 capsule Oral BID Jony Shields MD   1 capsule at 03/01/21 0841   • lansoprazole (FIRST) oral suspension 30 mg  30 mg Per G Tube QAM Jony Shields MD   30 mg at 03/01/21 0628   • melatonin tablet 3 mg  3 mg Per G Tube Daily With Dinner Jony Shields MD   3 mg at 02/28/21 1818   • OLANZapine (zyPREXA) injection 5 mg  5 mg Intramuscular Q8H PRN Osvaldo Simons MD       • ondansetron (ZOFRAN) injection 4 mg  4 mg Intravenous Q6H PRN Jony Shields MD       • polyethylene glycol (MIRALAX) packet 17 g  17 g Oral Daily PRN Osvaldo Simons MD       • sodium chloride 0.9 % bolus 500 mL  500 mL Intravenous Once Jony Shields MD   Stopped at 02/24/21 1847   • sodium chloride 0.9 % flush 10 mL  10 mL Intravenous PRN Jony Shields MD   10 mL at 02/26/21 2111       ASSESSMENT:  Hyponatremia, improved after IV fluids  Hypokalemia  Hypophosphatemia  Protein malnutrition  Anemia of chronic disease  Dysphagia on tube feeds  UTI, E. coli, MDR, on IV antibiotics per ID  Nephrolithiasis per urology  Dementia from prior strokes  CAMRON, resolved  ?  Pulmonary edema on recent chest x-ray but looks euvolemic, stable on room air  Hypertension, well  controlled  Diabetes      Plan:  Sodium level improved, renal function has normalized  Continue current tube feeds with increased free water  Stop IV fluids  Replace potassium/phosphorus orally, can use protocol as renal function has normalized  Follow-up a.m. electrolyte panel after replacement  Discharge arrangements in progress      Raghavendra Mcneal MD   [facility]   Office phone number: [phone]  Answering service phone number: [phone]    03/01/21  12:18 EST    Dictation performed using Dragon dictation software

## 2021-03-01 NOTE — PLAN OF CARE
Goal Outcome Evaluation:  Plan of Care Reviewed With: patient  Progress: no change  Outcome Summary: VSS, no signs of pain.  Continue tube feeds.  Pt will need 14 days total of IV antibiotics.  Replace K and Phos this shift.  Possible discharge tomorrow back to [facility].

## 2021-03-02 VITALS
HEART RATE: 68 BPM | DIASTOLIC BLOOD PRESSURE: 87 MMHG | WEIGHT: 97 LBS | HEIGHT: 68 IN | OXYGEN SATURATION: 97 % | BODY MASS INDEX: 14.7 KG/M2 | RESPIRATION RATE: 20 BRPM | SYSTOLIC BLOOD PRESSURE: 144 MMHG | TEMPERATURE: 97.5 F

## 2021-03-02 PROBLEM — G93.41 METABOLIC ENCEPHALOPATHY: Status: RESOLVED | Noted: 2021-02-24 | Resolved: 2021-03-02

## 2021-03-02 LAB
ALBUMIN SERPL-MCNC: 2.8 G/DL (ref 3.5–5.2)
ANION GAP SERPL CALCULATED.3IONS-SCNC: 7.9 MMOL/L (ref 5–15)
BUN SERPL-MCNC: 19 MG/DL (ref 8–23)
BUN/CREAT SERPL: 32.2 (ref 7–25)
CALCIUM SPEC-SCNC: 8.7 MG/DL (ref 8.6–10.5)
CHLORIDE SERPL-SCNC: 109 MMOL/L (ref 98–107)
CO2 SERPL-SCNC: 26.1 MMOL/L (ref 22–29)
CREAT SERPL-MCNC: 0.59 MG/DL (ref 0.76–1.27)
DEPRECATED RDW RBC AUTO: 42.1 FL (ref 37–54)
ERYTHROCYTE [DISTWIDTH] IN BLOOD BY AUTOMATED COUNT: 14 % (ref 12.3–15.4)
GFR SERPL CREATININE-BSD FRML MDRD: >150 ML/MIN/1.73
GLUCOSE BLDC GLUCOMTR-MCNC: 133 MG/DL (ref 70–130)
GLUCOSE BLDC GLUCOMTR-MCNC: 139 MG/DL (ref 70–130)
GLUCOSE BLDC GLUCOMTR-MCNC: 150 MG/DL (ref 70–130)
GLUCOSE BLDC GLUCOMTR-MCNC: 158 MG/DL (ref 70–130)
GLUCOSE SERPL-MCNC: 152 MG/DL (ref 65–99)
HCT VFR BLD AUTO: 35.8 % (ref 37.5–51)
HGB BLD-MCNC: 11.6 G/DL (ref 13–17.7)
MCH RBC QN AUTO: 26.6 PG (ref 26.6–33)
MCHC RBC AUTO-ENTMCNC: 32.4 G/DL (ref 31.5–35.7)
MCV RBC AUTO: 82.1 FL (ref 79–97)
PHOSPHATE SERPL-MCNC: 2 MG/DL (ref 2.5–4.5)
PHOSPHATE SERPL-MCNC: 2.4 MG/DL (ref 2.5–4.5)
PHOSPHATE SERPL-MCNC: 2.4 MG/DL (ref 2.5–4.5)
PHOSPHATE SERPL-MCNC: 2.7 MG/DL (ref 2.5–4.5)
PLATELET # BLD AUTO: 215 10*3/MM3 (ref 140–450)
PMV BLD AUTO: 12.9 FL (ref 6–12)
POTASSIUM SERPL-SCNC: 3.9 MMOL/L (ref 3.5–5.2)
POTASSIUM SERPL-SCNC: 4 MMOL/L (ref 3.5–5.2)
RBC # BLD AUTO: 4.36 10*6/MM3 (ref 4.14–5.8)
SODIUM SERPL-SCNC: 143 MMOL/L (ref 136–145)
WBC # BLD AUTO: 10.42 10*3/MM3 (ref 3.4–10.8)

## 2021-03-02 PROCEDURE — 25010000002 CEFEPIME PER 500 MG: Performed by: INTERNAL MEDICINE

## 2021-03-02 PROCEDURE — C1751 CATH, INF, PER/CENT/MIDLINE: HCPCS

## 2021-03-02 PROCEDURE — 80069 RENAL FUNCTION PANEL: CPT | Performed by: HOSPITALIST

## 2021-03-02 PROCEDURE — 84100 ASSAY OF PHOSPHORUS: CPT | Performed by: HOSPITALIST

## 2021-03-02 PROCEDURE — 36410 VNPNXR 3YR/> PHY/QHP DX/THER: CPT

## 2021-03-02 PROCEDURE — 63710000001 INSULIN REGULAR HUMAN PER 5 UNITS: Performed by: INTERNAL MEDICINE

## 2021-03-02 PROCEDURE — 85027 COMPLETE CBC AUTOMATED: CPT | Performed by: HOSPITALIST

## 2021-03-02 PROCEDURE — 82962 GLUCOSE BLOOD TEST: CPT

## 2021-03-02 RX ORDER — LANOLIN ALCOHOL/MO/W.PET/CERES
3 CREAM (GRAM) TOPICAL
Qty: 30 TABLET
Start: 2021-03-02 | End: 2021-05-29

## 2021-03-02 RX ORDER — SODIUM CHLORIDE 0.9 % (FLUSH) 0.9 %
10 SYRINGE (ML) INJECTION EVERY 12 HOURS SCHEDULED
Status: DISCONTINUED | OUTPATIENT
Start: 2021-03-02 | End: 2021-03-02 | Stop reason: HOSPADM

## 2021-03-02 RX ORDER — L.ACID,PARA/B.BIFIDUM/S.THERM 8B CELL
1 CAPSULE ORAL 2 TIMES DAILY
Start: 2021-03-02 | End: 2021-05-29

## 2021-03-02 RX ORDER — ASPIRIN 325 MG
325 TABLET ORAL DAILY
Start: 2021-03-02 | End: 2021-05-29

## 2021-03-02 RX ORDER — POLYETHYLENE GLYCOL 3350 17 G/17G
17 POWDER, FOR SOLUTION ORAL DAILY PRN
Start: 2021-03-02 | End: 2021-05-29

## 2021-03-02 RX ORDER — SODIUM CHLORIDE 0.9 % (FLUSH) 0.9 %
10 SYRINGE (ML) INJECTION AS NEEDED
Status: DISCONTINUED | OUTPATIENT
Start: 2021-03-02 | End: 2021-03-02 | Stop reason: HOSPADM

## 2021-03-02 RX ORDER — LANSOPRAZOLE
30 KIT EVERY MORNING
Qty: 300 ML
Start: 2021-03-03 | End: 2021-05-29

## 2021-03-02 RX ORDER — IPRATROPIUM BROMIDE AND ALBUTEROL SULFATE 2.5; .5 MG/3ML; MG/3ML
3 SOLUTION RESPIRATORY (INHALATION) EVERY 4 HOURS PRN
Qty: 360 ML
Start: 2021-03-02 | End: 2021-05-29

## 2021-03-02 RX ADMIN — INSULIN HUMAN 2 UNITS: 100 INJECTION, SOLUTION PARENTERAL at 06:22

## 2021-03-02 RX ADMIN — Medication 1 CAPSULE: at 09:56

## 2021-03-02 RX ADMIN — ASPIRIN 325 MG: 325 TABLET ORAL at 09:56

## 2021-03-02 RX ADMIN — Medication 2 PACKET: at 00:42

## 2021-03-02 RX ADMIN — Medication 2 PACKET: at 09:58

## 2021-03-02 RX ADMIN — CEFEPIME HYDROCHLORIDE 2 G: 2 INJECTION, POWDER, FOR SOLUTION INTRAVENOUS at 09:59

## 2021-03-02 RX ADMIN — LANSOPRAZOLE 30 MG: KIT at 07:54

## 2021-03-02 NOTE — PROGRESS NOTES
"Physicians Statement of Medical Necessity for  Ambulance Transportation    GENERAL INFORMATION     Name: Cam Gonsalves  YOB: 1945  Medicare #: [id number]  Transport Date: 3/2/2021 (Valid for round trips this date, or for scheduled repetitive trips for 60 days from the date signed below.)  Origin: [facility]  Destination: [facility]  Is the Patient's stay covered under Medicare Part A (PPS/DRG?)Yes  Closest appropriate facility? Yes  If this a hosp-hosp transfer? No  Is this a hospice patient? No    MEDICAL NECESSITY QUESTIONAIRE    Ambulance Transportation is medically necessary only if other means of transportation are contraindicated or would be potentially harmful to the patient.  To meet this requirement, the patient must be either \"bed confined\" or suffer from a condition such that transport by means other than an ambulance is contraindicated by the patient's condition.  The following questions must be answered by the healthcare professional signing below for this form to be valid:     1) Describe the MEDICAL CONDITION (physical and/or mental) of this patient AT THE TIME OF AMBULANCE TRANSPORT that requires the patient to be transported in an ambulance, and why transport by other means is contraindicated by the patient's condition: bedbound  Past Medical History:   Diagnosis Date   • Anxiety    • Aphasia    • Cerebral infarction (CMS/HCC)    • Chronic viral hepatitis C (CMS/HCC)    • Diabetes mellitus (CMS/HCC)    • Dysphagia, oropharyngeal phase    • Enterocolitis    • Hemiplegia and hemiparesis following cerebral infarction affecting right dominant side (CMS/HCC)    • Hyperlipidemia    • Hypertension    • Kidney failure, acute (CMS/HCC)    • Myocardial infarction (CMS/HCC)    • Rhabdomyolysis    • Stroke (CMS/HCC)       Past Surgical History:   Procedure Laterality Date   • CYSTOSCOPY W/ URETERAL STENT PLACEMENT Left 2/25/2021    Procedure: CYSTOSCOPY left retrograde " "pyelogram, left URETERAL STENT INSERTION;  Surgeon: Rick Charles Jr., MD;  Location: [facility];  Service: Urology;  Laterality: Left;      2) Is this patient \"bed confined\" as defined below?Yes   To be \"bed confined\" the patient must satisfy all three of the following criteria:  (1) unable to get up from bed without assistance; AND (2) unable to ambulate;  AND (3) unable to sit in a chair or wheelchair.  3) Can this patient safely be transported by car or wheelchair van (I.e., may safely sit during transport, without an attendant or monitoring?)No   4. In addition to completing questions 1-3 above, please check any of the following conditions that apply*:          *Note: supporting documentation for any boxes checked must be maintained in the patient's medical records Patient is confused and Medical attendant required      SIGNATURE OF PHYSICIAN OR OTHER AUTHORIZED HEALTHCARE PROFESSIONAL    I certify that the above information is true and correct based on my evaluation of this patient, and represent that the patient requires transport by ambulance and that other forms of transport are contraindicated.  I understand that this information will be used by the Centers for Medicare and Medicaid Services (CMS) to support the determiniation of medical necessity for ambulance services, and I represent that I have personal knowledge of the patient's condition at the time of transport.    x   If this box is checked, I also certify that the patient is physically or mentally incapable of signing the ambulance service's claim form and that the institution with which I am affiliated has furnished care, services or assistance to the patient.  My signature below is made on behalf of the patient pursuant to 42 .36(b)(4). In accordance with 42 .37, the specific reason(s) that the patient is physically or mentally incapable of signing the claim for is as follows: confusion    Signature of Physician or " Healthcare Professional  Date/Time:   3/2/2021 1016     (For Scheduled repetitive transport, this form is not valid for transports performed more than 60 days after this date).                                                                                                                                      -----Elisabeth Alvarez--------------------------  Printed Name and Credentials of Physician or Authorized Healthcare Professional     *Form must be signed by patient's attending physician for scheduled, repetitive transports,.  For non-repetitive ambulance transports, if unable to obtain the signature of the attending physician, any of the following may sign (please select below):     Physician  Clinical Nurse Specialist  Registered Nurse     Physician Assistant x Discharge Planner  Licensed Practical Nurse     Nurse Practitioner x

## 2021-03-02 NOTE — NURSING NOTE
Called patient's niece again to try to get informed consent for PICC line placement. Left voicemail message with call back #.

## 2021-03-02 NOTE — PLAN OF CARE
Goal Outcome Evaluation:  Plan of Care Reviewed With: patient     Outcome Summary: Resting well.  No signs of pain.  VSS.  Tube feeds with free water.  Phos replaced this shift.  Awaiting labwork.  Will continue to monitor labs.

## 2021-03-02 NOTE — PLAN OF CARE
Goal Outcome Evaluation:  Plan of Care Reviewed With: family  Progress: improving  Outcome Summary: [facility]

## 2021-03-02 NOTE — SIGNIFICANT NOTE
03/02/21 1545   Midline Catheter - Double Lumen 03/02/21 Left Brachial   Placement Date/Time: 03/02/21 1540   Hand Hygiene Completed: Yes  Site Prep: Chlorhexidine  All 5 Sterile Barriers Used (Gloves, Gown, Cap, Mask, Large Sterile Drape): Yes  Orientation: Left  Location: Brachial  Size (Fr): 4  Initial Exposed Catheter ...   Site Assessment Clean;Dry;Intact   #1 Lumen Status Blood return noted;Capped;Flushed;Normal saline locked   #2 Lumen Status Blood return noted;Capped;Flushed;Normal saline locked   Length kamran (cm) 0 cm  (TRIMMED 10)   Extremity Circumference (cm) 25 cm   Dressing Type Border Dressing;Securing device;Antimicrobial dressing/disc   Dressing Status Clean;Dry;Intact   Dressing Intervention New dressing   Liquid Adhesive Contraindicated (comment)   Dressing Change Due 03/09/21   Indication/Daily Review of Necessity blood sampling;intravenous medication therapy   LOT [id number] EXPIRES 2021-11-30    Midline placed to be discharged to facility for 9 more days of iv ABX. Midline placed without difficulty and ok to use now.

## 2021-03-02 NOTE — NURSING NOTE
Tried to contact patient's niece, Radha More to obtain IC regarding PICC line placement, needed for discharge. Left voice message with call back # left. Will continue to try and contact.

## 2021-03-02 NOTE — NURSING NOTE
Attempted to contact discharge facility to give report on patient. Unable to speak to anyone. Will try again .

## 2021-03-02 NOTE — PROGRESS NOTES
Continued Stay Note  [facility]     Patient Name: Cam Gonsalves  MRN: [id number]  Today's Date: 3/2/2021    Admit Date: 2/24/2021    Discharge Plan     Row Name 03/02/21 1503       Plan    Plan  Return to [facility]    Plan Comments  Multiple attempts to reach niece to gain consent by nursing for PICC line, no return call.  Called [facility] they are able to accept patient with MID line.  Midline placed.  EMS scheduled for 6PM today.        Discharge Codes    No documentation.       Expected Discharge Date and Time     Expected Discharge Date Expected Discharge Time    Mar 2, 2021             Elisabeth Alvarez RN

## 2021-03-02 NOTE — NURSING NOTE
Iv team consulted to place a picc line, spoke with Allison KNIGHT messages have been left for pt to be consented without success. Recommended checking with the facility to see if they accept midlines due to only 9 more days of iv ABX needed. She will verify with facility if midline is acceptable then notify MD for orders and call iv team back with further instructions.

## 2021-03-02 NOTE — DISCHARGE SUMMARY
[facility]    Date of Discharge:  3/2/2021    PCP: Enrique Carver MD    Discharge Diagnosis:   Active Hospital Problems    Diagnosis  POA   • **E coli bacteremia [R78.81, B96.20]  Yes   • Left ureteral stone [N20.1]  Unknown   • Sepsis (CMS/[facility]) [A41.9]  Yes   • GERD (gastroesophageal reflux disease) [K21.9]  Yes   • Hemiparesis affecting right side as late effect of cerebrovascular accident (CMS/[facility]) [I69.351]  Not Applicable   • Type 2 diabetes mellitus with hyperglycemia (CMS/[facility]) [E11.65]  Yes   • Hypertension [I10]  Yes   • Hyperlipidemia [E78.5]  Yes   • On tube feeding diet [Z78.9]  Yes   • PEG (percutaneous endoscopic gastrostomy) status (CMS/[facility]) [Z93.1]  Not Applicable   • Severe malnutrition (CMS/[facility]) [E43]  Yes   • Dysphagia due to old stroke [I69.391]  Not Applicable      Resolved Hospital Problems    Diagnosis Date Resolved POA   • Metabolic encephalopathy [G93.41] 03/02/2021 Yes     Procedure(s):  CYSTOSCOPY left retrograde pyelogram, left URETERAL STENT INSERTION     Consults     Date and Time Order Name Status Description    2/28/2021 1103 Inpatient Nephrology Consult      2/25/2021 1838 Inpatient Pulmonology Consult Completed     2/25/2021 1749 Inpatient Intensivist Consult      2/25/2021 0706 Inpatient Urology Consult Completed     2/24/2021 0925 Inpatient Infectious Diseases Consult Completed     2/24/2021 0545 Inpatient Neurology Consult Stroke Completed     2/24/2021 0531 [facility] (on-call MD unless specified) Details Completed         Hospital Course  Please see history and physical for details. Patient is a 75 y.o. male initially admitted by one of my partners due to concerns for sepsis and mental status changes.  Ultimately this patient at baseline due to past history of CVA with resulting vascular dementia and chronic right-sided hemiparesis compounded by aphasia and dysphagia with PEG tube.  I personally am not able to appreciate  mental status changes because he is not a thriving patient nor is he able to speak in the first place.  Regardless he was seen in consultation by infectious disease as well as neurology and urology and pulmonology.  To summarize this hospital course we had initial concerns of ESBL E. coli though final cultures did not reveal an ESBL etiology.  He did grow in multidrug-resistant E. coli/pansensitive Proteus with resulting septicemia compounded by left-sided hydronephrosis with stone that is post stent placement 2/25/2021.  Resulting blood cultures have shown no growth to date.  He does have a past history of C. difficile colitis as which she is on a probiotic.  Antibiotic guidance have been per ID recommendations.  Final recommendations are to continue with cefepime 2 g IV every 12 hours for a total of 14 days with a stop date of 3/11/2021.  Infectious disease request weekly CBC with differential and CMP faxed to their clinic at [phone].  Patient is otherwise tolerating his normal tube feeds via PEG with no issue.  There were concerns about this encephalopathy admission but there is been absolutely no signs of agitation and patient has not required any supplemental Zyprexa.  He is to continue with aspirin though neurology discontinued his statin secondary to cachexia.  Basically this patient at baseline is a very poor quality of life.  CODE STATUS is a DNR and his niece is the reported POA and medical decision maker in this process.  I have discussed the case during this admission with her and she can be reached at [phone].  Previous acute renal failure secondary to sepsis has resolved.  He did develop some hypernatremia secondary to lack of free water.  This will need to be monitored post discharge and we have increased his free water administration with tube feeds and by discharge his sodium is now therapeutic.  Prealbumin is only 9.3 and I feel this further concerns me for very poor overall prognosis.  Other  comorbidities with hypertension are now stable and previous hypotension reserve.  He is maintained on a PPI due to GERD.  While he does have a past history of diabetes with circulatory disease his A1c is only 7 and continue the sliding scale as dosed but otherwise sugars are therapeutic.  At this juncture CCP is help ordinate further disposition needs.  I feel this patient has bout is medically stable as he is getting get but I am not sure how much additional rehab is really good to benefit this patient.  I personally support transition towards comfort care with any further clinical decline but ultimately that choice will be up to his POA Radha Summers.     Condition on Discharge: Improved.     Temp:  [97.5 °F (36.4 °C)-98.8 °F (37.1 °C)] 97.5 °F (36.4 °C)  Heart Rate:  [66-71] 69  Resp:  [20] 20  BP: (117-152)/(78-89) 117/84  Body mass index is 14.75 kg/m².    Physical Exam  Constitutional:       Comments: Aphasic, not thriving   HENT:      Head: Normocephalic.      Nose: Nose normal.      Mouth/Throat:      Pharynx: Oropharynx is clear.   Eyes:      General: No scleral icterus.     Conjunctiva/sclera: Conjunctivae normal.   Cardiovascular:      Rate and Rhythm: Normal rate and regular rhythm.   Pulmonary:      Effort: Pulmonary effort is normal.      Breath sounds: Normal breath sounds.   Abdominal:      General: Bowel sounds are normal.      Palpations: Abdomen is soft.      Comments: Functional PEG   Musculoskeletal:         General: No swelling.   Skin:     General: Skin is warm and dry.   Neurological:      Mental Status: He is alert.      Comments: Chronic aphasia/right-sided weakness     [unfilled]    Disposition: Skilled Nursing Facility (DC - External)       Discharge Medications      New Medications      Instructions Start Date   aspirin 325 MG tablet   325 mg, Oral, Daily      cefepime 2 G/ ML solution  Commonly known as: MAXIPIME   2 g, Intravenous, Every 12 Hours      insulin regular 100 UNIT/ML  injection  Commonly known as: humuLIN R,novoLIN R   0-9 Units, Subcutaneous, Every 6 Hours Scheduled      ipratropium-albuterol 0.5-2.5 mg/3 ml nebulizer  Commonly known as: DUO-NEB   3 mL, Nebulization, Every 4 Hours PRN      lactobacillus acidophilus capsule capsule   1 capsule, Oral, 2 Times Daily      lansoprazole 3 MG/ML suspension oral suspension   30 mg, Per G Tube, Every Morning   Start Date: March 3, 2021     melatonin 3 MG tablet   3 mg, Per G Tube, Daily With Dinner      polyethylene glycol 17 g packet  Commonly known as: MIRALAX   17 g, Oral, Daily PRN         Stop These Medications    acetaminophen 160 MG/5ML solution  Commonly known as: TYLENOL     busPIRone 5 MG tablet  Commonly known as: BUSPAR     busPIRone 7.5 MG tablet  Commonly known as: BUSPAR     chlorhexidine 0.12 % solution  Commonly known as: PERIDEX     lansoprazole 3 mg/mL in sodium bicarbonate injection 8.4%     traMADol 50 MG tablet  Commonly known as: ULTRAM             Additional Instructions for the Follow-ups that You Need to Schedule     Discharge Follow-up with PCP   As directed       Currently Documented PCP:    Enrique Carver MD    PCP Phone Number:    [phone]     Follow Up Details: PCP post rehab.  All consults per their recommendations           Follow-up Information     Enrique Carver MD .    Specialty: Family Medicine  Why: PCP post rehab.  All consults per their recommendations  Contact information:  [address] [facility] [address] [phone]                  [unfilled]   Osvaldo Simons MD  03/02/21  09:41 EST    Discharge time spent greater than 30 minutes.

## 2021-03-02 NOTE — PROGRESS NOTES
Case Management Discharge Note      Final Note: Discharge to [facility] and rehab. EMS scheduled for 6PM.         Selected Continued Care - Admitted Since 2/24/2021     Destination Coordination complete    Service Provider Selected Services Address Phone Fax Patient Preferred    [facility]  Skilled Nursing [address] [facility] [id number] [phone] --          Durable Medical Equipment    No services have been selected for the patient.              Dialysis/Infusion    No services have been selected for the patient.              Home Medical Care    No services have been selected for the patient.              Therapy    No services have been selected for the patient.              Community Resources    No services have been selected for the patient.                  Transportation Services  Ambulance: [facility]    Final Discharge Disposition Code: 03 - skilled nursing facility (SNF)

## 2021-03-02 NOTE — NURSING NOTE
Call #2 to Radha More, niece of patient, to receive consent for PICC line placement for discharge.  Message left.    [facility] information.  Spoke to KATHARINE and she gave a second emergency contact [facility] [phone].  Attempt to call second contact and voicemail only.

## 2021-03-03 LAB
BACTERIA SPEC AEROBE CULT: NORMAL
BACTERIA SPEC AEROBE CULT: NORMAL

## 2021-03-22 ENCOUNTER — APPOINTMENT (OUTPATIENT)
Dept: PREADMISSION TESTING | Facility: HOSPITAL | Age: 76
End: 2021-03-22

## 2021-03-22 VITALS
OXYGEN SATURATION: 100 % | DIASTOLIC BLOOD PRESSURE: 72 MMHG | SYSTOLIC BLOOD PRESSURE: 122 MMHG | HEART RATE: 82 BPM | RESPIRATION RATE: 22 BRPM

## 2021-03-22 PROCEDURE — C9803 HOPD COVID-19 SPEC COLLECT: HCPCS | Performed by: NURSE PRACTITIONER

## 2021-03-22 PROCEDURE — U0004 COV-19 TEST NON-CDC HGH THRU: HCPCS | Performed by: NURSE PRACTITIONER

## 2021-03-22 RX ORDER — CLONAZEPAM 0.25 MG/1
0.25 TABLET, ORALLY DISINTEGRATING ORAL NIGHTLY
Status: ON HOLD | COMMUNITY
End: 2021-10-12 | Stop reason: SDUPTHER

## 2021-03-22 RX ORDER — BUSPIRONE HYDROCHLORIDE 5 MG/1
5 TABLET ORAL 3 TIMES DAILY
COMMUNITY
End: 2021-05-29

## 2021-03-22 RX ORDER — HYDROCODONE BITARTRATE AND ACETAMINOPHEN 5; 325 MG/1; MG/1
1 TABLET ORAL EVERY 6 HOURS PRN
Status: ON HOLD | COMMUNITY
End: 2021-06-01 | Stop reason: SDUPTHER

## 2021-03-22 NOTE — DISCHARGE INSTRUCTIONS
Take the following medications the morning of surgery with a small sip of water    BUSPIRONE AND LANSOPRAZOLE      If you are on prescription narcotic pain medication to control your pain you may also take that medication the morning of surgery.    General Instructions:  • Do not eat or drink anything after midnight the night before surgery.  • Infants may have breast milk up to four hours before surgery.  • Infants drinking formula may drink formula up to six hours before surgery.   • Patients who avoid smoking, chewing tobacco and alcohol for 4 weeks prior to surgery have a reduced risk of post-operative complications.  Quit smoking as many days before surgery as you can.  • Do not smoke, use chewing tobacco or drink alcohol the day of surgery.   • If applicable bring your C-PAP/ BI-PAP machine.  • Bring any papers given to you in the doctor’s office.  • Wear clean comfortable clothes.  • Do not wear contact lenses, false eyelashes or make-up.  Bring a case for your glasses.   • Bring crutches or walker if applicable.  • Remove all piercings.  Leave jewelry and any other valuables at home.  • Hair extensions with metal clips must be removed prior to surgery.  • The Pre-Admission Testing nurse will instruct you to bring medications if unable to obtain an accurate list in Pre-Admission Testing.        If you were given a blood bank ID arm band remember to bring it with you the day of surgery.    Preventing a Surgical Site Infection:  • For 2 to 3 days before surgery, avoid shaving with a razor because the razor can irritate skin and make it easier to develop an infection.    • Any areas of open skin can increase the risk of a post-operative wound infection by allowing bacteria to enter and travel throughout the body.  Notify your surgeon if you have any skin wounds / rashes even if it is not near the expected surgical site.  The area will need assessed to determine if surgery should be delayed until it is  healed.  • The night prior to surgery shower using a fresh bar of anti-bacterial soap (such as Dial) and clean washcloth.  Sleep in a clean bed with clean clothing.  Do not allow pets to sleep with you.  • Shower on the morning of surgery using a fresh bar of anti-bacterial soap (such as Dial) and clean washcloth.  Dry with a clean towel and dress in clean clothing.  • Ask your surgeon if you will be receiving antibiotics prior to surgery.  • Make sure you, your family, and all healthcare providers clean their hands with soap and water or an alcohol based hand  before caring for you or your wound.    Day of surgery: 3/25/2021 arrival time 12:30 pm  Your arrival time is approximately two hours before your scheduled surgery time.  Upon arrival, a Pre-op nurse and Anesthesiologist will review your health history, obtain vital signs, and answer questions you may have.  The only belongings needed at this time will be your home medications and if applicable your C-PAP/BI-PAP machine.  A Pre-op nurse will start an IV and you may receive medication in preparation for surgery, including something to help you relax.      Please be aware that surgery does come with discomfort.  We want to make every effort to control your discomfort so please discuss any uncontrolled symptoms with your nurse.   Your doctor will most likely have prescribed pain medications.      If you are going home after surgery you will receive individualized written care instructions before being discharged.  A responsible adult must drive you to and from the hospital on the day of your surgery and stay with you for 24 hours.  Discharge prescriptions can be filled by the hospital pharmacy during regular pharmacy hours.  If you are having surgery late in the day/evening your prescription may be e-prescribed to your pharmacy.  Please verify your pharmacy hours or chose a 24 hour pharmacy to avoid not having access to your prescription because your  pharmacy has closed for the day.    If you are staying overnight following surgery, you will be transported to your hospital room following the recovery period.  [facility] has all private rooms.    If you have any questions please call Pre-Admission Testing at [phone].  Deductibles and co-payments are collected on the day of service. Please be prepared to pay the required co-pay, deductible or deposit on the day of service as defined by your plan.    Patient Education for Self-Quarantine Process    Following your COVID testing, we strongly recommend that you do not leave your home after you have been tested for COVID except to get medical care. This includes not going to work, school or to public areas.  If this is not possible for you to do please limit your activities to only required outings.  Be sure to wear a mask when you are with other people, practice social distancing and wash your hands frequently.      The following items provide additional details to keep you safe.  • Wash your hands with soap and water frequently for at least 20 seconds.   • Avoid touching your eyes, nose and mouth with unwashed hands.  • Do not share anything - utensils, towels, food from the same bowl.   • Have your own utensils, drinking glass, dishes, towels and bedding.   • Do not have visitors.   • Do use FaceTime to stay in touch with family and friends.  • You should stay in a specific room away from others if possible.   • Stay at least 6 feet away from others in the home if you cannot have a dedicated room to yourself.   • Do not snuggle with your pet. While the CDC says there is no evidence that pets can spread COVID-19 or be infected from humans, it is probably best to avoid “petting, snuggling, being kissed or licked and sharing food (during self-quarantine)”, according to the CDC.   • Sanitize household surfaces daily. Include all high touch areas (door handles, light switches, phones, countertops,  etc.)  • Do not share a bathroom with others, if possible.   • Wear a mask around others in your home if you are unable to stay in a separate room or 6 feet apart. If  you are unable to wear a mask, have your family member wear a mask if they must be within 6 feet of you.   Call your surgeon immediately if you experience any of the following symptoms:  • Sore Throat  • Shortness of Breath or difficulty breathing  • Cough  • Chills  • Body soreness or muscle pain  • Headache  • Fever  • New loss of taste or smell  • Do not arrive for your surgery ill.  Your procedure will need to be rescheduled to another time.  You will need to call your physician before the day of surgery to avoid any unnecessary exposure to hospital staff as well as other patients.

## 2021-03-23 LAB — SARS-COV-2 RNA RESP QL NAA+PROBE: NOT DETECTED

## 2021-03-25 ENCOUNTER — ANESTHESIA (OUTPATIENT)
Dept: PERIOP | Facility: HOSPITAL | Age: 76
End: 2021-03-25

## 2021-03-25 ENCOUNTER — ANESTHESIA EVENT (OUTPATIENT)
Dept: PERIOP | Facility: HOSPITAL | Age: 76
End: 2021-03-25

## 2021-03-25 ENCOUNTER — HOSPITAL ENCOUNTER (OUTPATIENT)
Facility: HOSPITAL | Age: 76
Setting detail: HOSPITAL OUTPATIENT SURGERY
Discharge: NURSING, REHAB OR LONG-TERM CARE | End: 2021-03-25
Attending: UROLOGY | Admitting: UROLOGY

## 2021-03-25 ENCOUNTER — APPOINTMENT (OUTPATIENT)
Dept: GENERAL RADIOLOGY | Facility: HOSPITAL | Age: 76
End: 2021-03-25

## 2021-03-25 VITALS
TEMPERATURE: 98.2 F | HEART RATE: 89 BPM | WEIGHT: 113.5 LBS | SYSTOLIC BLOOD PRESSURE: 126 MMHG | OXYGEN SATURATION: 95 % | DIASTOLIC BLOOD PRESSURE: 81 MMHG | HEIGHT: 68 IN | BODY MASS INDEX: 17.2 KG/M2 | RESPIRATION RATE: 16 BRPM

## 2021-03-25 DIAGNOSIS — N20.1 LEFT URETERAL STONE: Primary | ICD-10-CM

## 2021-03-25 DIAGNOSIS — N20.1 URETER, CALCULUS: ICD-10-CM

## 2021-03-25 LAB — GLUCOSE BLDC GLUCOMTR-MCNC: 150 MG/DL (ref 70–130)

## 2021-03-25 PROCEDURE — 76000 FLUOROSCOPY <1 HR PHYS/QHP: CPT

## 2021-03-25 PROCEDURE — C2617 STENT, NON-COR, TEM W/O DEL: HCPCS | Performed by: UROLOGY

## 2021-03-25 PROCEDURE — 0 IOTHALAMATE 60 % SOLUTION: Performed by: UROLOGY

## 2021-03-25 PROCEDURE — 25010000002 PROPOFOL 10 MG/ML EMULSION: Performed by: NURSE ANESTHETIST, CERTIFIED REGISTERED

## 2021-03-25 PROCEDURE — C1758 CATHETER, URETERAL: HCPCS | Performed by: UROLOGY

## 2021-03-25 PROCEDURE — C1894 INTRO/SHEATH, NON-LASER: HCPCS | Performed by: UROLOGY

## 2021-03-25 PROCEDURE — 82962 GLUCOSE BLOOD TEST: CPT

## 2021-03-25 PROCEDURE — C1769 GUIDE WIRE: HCPCS | Performed by: UROLOGY

## 2021-03-25 PROCEDURE — 74018 RADEX ABDOMEN 1 VIEW: CPT

## 2021-03-25 PROCEDURE — 25010000002 FENTANYL CITRATE (PF) 100 MCG/2ML SOLUTION: Performed by: NURSE ANESTHETIST, CERTIFIED REGISTERED

## 2021-03-25 PROCEDURE — 82365 CALCULUS SPECTROSCOPY: CPT | Performed by: UROLOGY

## 2021-03-25 PROCEDURE — 25010000003 CEFAZOLIN IN DEXTROSE 2-4 GM/100ML-% SOLUTION: Performed by: UROLOGY

## 2021-03-25 PROCEDURE — 25010000002 ONDANSETRON PER 1 MG: Performed by: NURSE ANESTHETIST, CERTIFIED REGISTERED

## 2021-03-25 PROCEDURE — 25010000002 PHENYLEPHRINE PER 1 ML: Performed by: NURSE ANESTHETIST, CERTIFIED REGISTERED

## 2021-03-25 DEVICE — URETERAL STENT
Type: IMPLANTABLE DEVICE | Site: URETER | Status: FUNCTIONAL
Brand: CONTOUR™

## 2021-03-25 RX ORDER — PROMETHAZINE HYDROCHLORIDE 25 MG/1
25 TABLET ORAL ONCE AS NEEDED
Status: DISCONTINUED | OUTPATIENT
Start: 2021-03-25 | End: 2021-03-25 | Stop reason: HOSPADM

## 2021-03-25 RX ORDER — MIDAZOLAM HYDROCHLORIDE 1 MG/ML
1 INJECTION INTRAMUSCULAR; INTRAVENOUS
Status: DISCONTINUED | OUTPATIENT
Start: 2021-03-25 | End: 2021-03-25 | Stop reason: HOSPADM

## 2021-03-25 RX ORDER — LABETALOL HYDROCHLORIDE 5 MG/ML
5 INJECTION, SOLUTION INTRAVENOUS
Status: DISCONTINUED | OUTPATIENT
Start: 2021-03-25 | End: 2021-03-25 | Stop reason: HOSPADM

## 2021-03-25 RX ORDER — CEFAZOLIN SODIUM 2 G/100ML
2 INJECTION, SOLUTION INTRAVENOUS ONCE
Status: COMPLETED | OUTPATIENT
Start: 2021-03-25 | End: 2021-03-25

## 2021-03-25 RX ORDER — LIDOCAINE HYDROCHLORIDE 20 MG/ML
INJECTION, SOLUTION INFILTRATION; PERINEURAL AS NEEDED
Status: DISCONTINUED | OUTPATIENT
Start: 2021-03-25 | End: 2021-03-25 | Stop reason: SURG

## 2021-03-25 RX ORDER — MIDAZOLAM HYDROCHLORIDE 1 MG/ML
0.5 INJECTION INTRAMUSCULAR; INTRAVENOUS
Status: DISCONTINUED | OUTPATIENT
Start: 2021-03-25 | End: 2021-03-25 | Stop reason: HOSPADM

## 2021-03-25 RX ORDER — SODIUM CHLORIDE, SODIUM LACTATE, POTASSIUM CHLORIDE, CALCIUM CHLORIDE 600; 310; 30; 20 MG/100ML; MG/100ML; MG/100ML; MG/100ML
9 INJECTION, SOLUTION INTRAVENOUS CONTINUOUS
Status: DISCONTINUED | OUTPATIENT
Start: 2021-03-25 | End: 2021-03-25 | Stop reason: HOSPADM

## 2021-03-25 RX ORDER — ONDANSETRON 2 MG/ML
INJECTION INTRAMUSCULAR; INTRAVENOUS AS NEEDED
Status: DISCONTINUED | OUTPATIENT
Start: 2021-03-25 | End: 2021-03-25 | Stop reason: SURG

## 2021-03-25 RX ORDER — HYDRALAZINE HYDROCHLORIDE 20 MG/ML
5 INJECTION INTRAMUSCULAR; INTRAVENOUS
Status: DISCONTINUED | OUTPATIENT
Start: 2021-03-25 | End: 2021-03-25 | Stop reason: HOSPADM

## 2021-03-25 RX ORDER — HYDROCODONE BITARTRATE AND ACETAMINOPHEN 7.5; 325 MG/1; MG/1
1 TABLET ORAL ONCE AS NEEDED
Status: DISCONTINUED | OUTPATIENT
Start: 2021-03-25 | End: 2021-03-25 | Stop reason: HOSPADM

## 2021-03-25 RX ORDER — EPHEDRINE SULFATE 50 MG/ML
5 INJECTION, SOLUTION INTRAVENOUS ONCE AS NEEDED
Status: DISCONTINUED | OUTPATIENT
Start: 2021-03-25 | End: 2021-03-25 | Stop reason: HOSPADM

## 2021-03-25 RX ORDER — PROMETHAZINE HYDROCHLORIDE 25 MG/1
25 SUPPOSITORY RECTAL ONCE AS NEEDED
Status: DISCONTINUED | OUTPATIENT
Start: 2021-03-25 | End: 2021-03-25 | Stop reason: HOSPADM

## 2021-03-25 RX ORDER — SODIUM CHLORIDE 0.9 % (FLUSH) 0.9 %
3 SYRINGE (ML) INJECTION EVERY 12 HOURS SCHEDULED
Status: DISCONTINUED | OUTPATIENT
Start: 2021-03-25 | End: 2021-03-25 | Stop reason: HOSPADM

## 2021-03-25 RX ORDER — DIPHENHYDRAMINE HYDROCHLORIDE 50 MG/ML
12.5 INJECTION INTRAMUSCULAR; INTRAVENOUS
Status: DISCONTINUED | OUTPATIENT
Start: 2021-03-25 | End: 2021-03-25 | Stop reason: HOSPADM

## 2021-03-25 RX ORDER — FLUMAZENIL 0.1 MG/ML
0.2 INJECTION INTRAVENOUS AS NEEDED
Status: DISCONTINUED | OUTPATIENT
Start: 2021-03-25 | End: 2021-03-25 | Stop reason: HOSPADM

## 2021-03-25 RX ORDER — SULFAMETHOXAZOLE AND TRIMETHOPRIM 800; 160 MG/1; MG/1
1 TABLET ORAL 2 TIMES DAILY
Qty: 28 TABLET | Refills: 0 | Status: SHIPPED | OUTPATIENT
Start: 2021-03-25 | End: 2021-05-29

## 2021-03-25 RX ORDER — MAGNESIUM HYDROXIDE 1200 MG/15ML
LIQUID ORAL AS NEEDED
Status: DISCONTINUED | OUTPATIENT
Start: 2021-03-25 | End: 2021-03-25 | Stop reason: HOSPADM

## 2021-03-25 RX ORDER — FENTANYL CITRATE 50 UG/ML
25 INJECTION, SOLUTION INTRAMUSCULAR; INTRAVENOUS
Status: DISCONTINUED | OUTPATIENT
Start: 2021-03-25 | End: 2021-03-25 | Stop reason: HOSPADM

## 2021-03-25 RX ORDER — SODIUM CHLORIDE 0.9 % (FLUSH) 0.9 %
3-10 SYRINGE (ML) INJECTION AS NEEDED
Status: DISCONTINUED | OUTPATIENT
Start: 2021-03-25 | End: 2021-03-25 | Stop reason: HOSPADM

## 2021-03-25 RX ORDER — FAMOTIDINE 10 MG/ML
20 INJECTION, SOLUTION INTRAVENOUS ONCE
Status: COMPLETED | OUTPATIENT
Start: 2021-03-25 | End: 2021-03-25

## 2021-03-25 RX ORDER — DIPHENHYDRAMINE HCL 25 MG
25 CAPSULE ORAL
Status: DISCONTINUED | OUTPATIENT
Start: 2021-03-25 | End: 2021-03-25 | Stop reason: HOSPADM

## 2021-03-25 RX ORDER — LIDOCAINE HYDROCHLORIDE 10 MG/ML
0.5 INJECTION, SOLUTION EPIDURAL; INFILTRATION; INTRACAUDAL; PERINEURAL ONCE AS NEEDED
Status: DISCONTINUED | OUTPATIENT
Start: 2021-03-25 | End: 2021-03-25 | Stop reason: HOSPADM

## 2021-03-25 RX ORDER — ONDANSETRON 2 MG/ML
4 INJECTION INTRAMUSCULAR; INTRAVENOUS ONCE AS NEEDED
Status: DISCONTINUED | OUTPATIENT
Start: 2021-03-25 | End: 2021-03-25 | Stop reason: HOSPADM

## 2021-03-25 RX ORDER — OXYCODONE AND ACETAMINOPHEN 7.5; 325 MG/1; MG/1
1 TABLET ORAL ONCE AS NEEDED
Status: DISCONTINUED | OUTPATIENT
Start: 2021-03-25 | End: 2021-03-25 | Stop reason: HOSPADM

## 2021-03-25 RX ORDER — NALOXONE HCL 0.4 MG/ML
0.2 VIAL (ML) INJECTION AS NEEDED
Status: DISCONTINUED | OUTPATIENT
Start: 2021-03-25 | End: 2021-03-25 | Stop reason: HOSPADM

## 2021-03-25 RX ORDER — PROPOFOL 10 MG/ML
VIAL (ML) INTRAVENOUS AS NEEDED
Status: DISCONTINUED | OUTPATIENT
Start: 2021-03-25 | End: 2021-03-25 | Stop reason: SURG

## 2021-03-25 RX ORDER — FENTANYL CITRATE 50 UG/ML
50 INJECTION, SOLUTION INTRAMUSCULAR; INTRAVENOUS
Status: DISCONTINUED | OUTPATIENT
Start: 2021-03-25 | End: 2021-03-25 | Stop reason: HOSPADM

## 2021-03-25 RX ORDER — ROCURONIUM BROMIDE 10 MG/ML
INJECTION, SOLUTION INTRAVENOUS AS NEEDED
Status: DISCONTINUED | OUTPATIENT
Start: 2021-03-25 | End: 2021-03-25 | Stop reason: SURG

## 2021-03-25 RX ADMIN — PHENYLEPHRINE HYDROCHLORIDE 200 MCG: 10 INJECTION INTRAVENOUS at 14:41

## 2021-03-25 RX ADMIN — PROPOFOL 150 MG: 10 INJECTION, EMULSION INTRAVENOUS at 14:36

## 2021-03-25 RX ADMIN — CEFAZOLIN SODIUM 2 G: 2 INJECTION, SOLUTION INTRAVENOUS at 14:17

## 2021-03-25 RX ADMIN — SODIUM CHLORIDE, POTASSIUM CHLORIDE, SODIUM LACTATE AND CALCIUM CHLORIDE 9 ML/HR: 600; 310; 30; 20 INJECTION, SOLUTION INTRAVENOUS at 14:04

## 2021-03-25 RX ADMIN — FAMOTIDINE 20 MG: 10 INJECTION INTRAVENOUS at 14:04

## 2021-03-25 RX ADMIN — SUGAMMADEX 100 MG: 100 INJECTION, SOLUTION INTRAVENOUS at 15:23

## 2021-03-25 RX ADMIN — ROCURONIUM BROMIDE 50 MG: 50 INJECTION INTRAVENOUS at 14:36

## 2021-03-25 RX ADMIN — FENTANYL CITRATE 25 MCG: 50 INJECTION, SOLUTION INTRAMUSCULAR; INTRAVENOUS at 15:56

## 2021-03-25 RX ADMIN — PHENYLEPHRINE HYDROCHLORIDE 200 MCG: 10 INJECTION INTRAVENOUS at 14:47

## 2021-03-25 RX ADMIN — FENTANYL CITRATE 25 MCG: 50 INJECTION, SOLUTION INTRAMUSCULAR; INTRAVENOUS at 15:47

## 2021-03-25 RX ADMIN — LIDOCAINE HYDROCHLORIDE 60 MG: 20 INJECTION, SOLUTION INFILTRATION; PERINEURAL at 14:36

## 2021-03-25 RX ADMIN — ONDANSETRON 4 MG: 2 INJECTION INTRAMUSCULAR; INTRAVENOUS at 15:20

## 2021-03-25 NOTE — ANESTHESIA POSTPROCEDURE EVALUATION
Patient: Cam Gonsalves    Procedure Summary     Date: 03/25/21 Room / Location: [facility] OR 01 / [facility] MAIN OR    Anesthesia Start: 1423 Anesthesia Stop: 1541    Procedure: CYSTOSCOPY URETEROSCOPY LASER LITHOTRIPSY STONE BASKET EXTRACION STENT EXCHANGE (Left ) Diagnosis:     Surgeons: Rick Charles Jr., MD Provider: Brandon Davies MD    Anesthesia Type: general ASA Status: 4          Anesthesia Type: general    Vitals  Vitals Value Taken Time   /87 03/25/21 1605   Temp     Pulse 89 03/25/21 1610   Resp 25 03/25/21 1600   SpO2 93 % 03/25/21 1610   Vitals shown include unvalidated device data.        Post Anesthesia Care and Evaluation    Patient location during evaluation: PACU  Patient participation: complete - patient participated  Level of consciousness: awake and alert  Pain management: adequate  Airway patency: patent  Anesthetic complications: No anesthetic complications    Cardiovascular status: acceptable  Respiratory status: acceptable  Hydration status: acceptable    Comments: --------------------            03/25/21               1600     --------------------   BP:       149/80     Pulse:      91       Resp:       25       Temp:                SpO2:      95%      --------------------

## 2021-03-25 NOTE — ANESTHESIA PROCEDURE NOTES
Airway  Urgency: elective    Date/Time: 3/25/2021 2:37 PM  Airway not difficult    General Information and Staff    Patient location during procedure: OR  CRNA: Hollie Gibson CRNA    Indications and Patient Condition  Indications for airway management: airway protection    Preoxygenated: yes  Mask difficulty assessment: 1 - vent by mask    Final Airway Details  Final airway type: endotracheal airway      Successful airway: ETT  Cuffed: yes   Successful intubation technique: direct laryngoscopy  Endotracheal tube insertion site: oral  Blade: Tonya  Blade size: 4  ETT size (mm): 7.5  Cormack-Lehane Classification: grade I - full view of glottis  Placement verified by: chest auscultation and capnometry   Cuff volume (mL): 6  Measured from: lips  ETT/EBT  to lips (cm): 21  Number of attempts at approach: 1  Assessment: lips, teeth, and gum same as pre-op and atraumatic intubation    Additional Comments  Smooth IV induction. Trachea intubated. Cuff up. Dentition intact without injury.

## 2021-03-25 NOTE — ANESTHESIA PREPROCEDURE EVALUATION
Anesthesia Evaluation     Patient summary reviewed and Nursing notes reviewed                Airway   Dental      Pulmonary    (+) COPD,   Cardiovascular     ECG reviewed  PT is on anticoagulation therapy  Rhythm: regular  Rate: normal    (+) CAD,       Neuro/Psych  (+) seizures, CVA residual symptoms, psychiatric history Anxiety,     GI/Hepatic/Renal/Endo    (+)  GERD,  hepatitis C, liver disease, renal disease stones, diabetes mellitus type 2 using insulin,     Musculoskeletal (-) negative ROS    Abdominal    Substance History - negative use     OB/GYN negative ob/gyn ROS         Other                        Anesthesia Plan    ASA 4     general   Rapid sequence(Expressive aphasia  Multiple medical pathos increasing mariano op risks including a recent hospital admission(late February)for covid with significant lung dysfunction/sepsis  Cardiomegaly  Dysphagia  right hemiparesis and hemiplegia  g-tube in situ    I have reviewed the patient's history and the chart, including all pertinent laboratory results and imaging. I have explained the risks of anesthesia including but not limited to dental damage, corneal abrasion, nerve injury, MI, stroke, and death to the patient and his POA who happens to be his niece, Radha More. Patient is aphasic so was unable to verbally express an understanding.    Questions answered for Radha during our phone conversation. Anesthetic plan discussed with Radha and our team as indicated. Radha More expressed an understanding of the above.    GETA with RSI (no anectine)   Patient is NPO thru his GTube  )  intravenous induction     Anesthetic plan, all risks, benefits, and alternatives have been provided, discussed and informed consent has been obtained with: patient.

## 2021-04-05 LAB
COLOR STONE: NORMAL
COMPN STONE: NORMAL
HYDROXYAPATITE 24H ENGDIFF UR: 100 %
LABORATORY COMMENT REPORT: NORMAL
Lab: NORMAL
Lab: NORMAL
PHOTO: NORMAL
SIZE STONE: NORMAL MM
SPEC SOURCE SUBJ: NORMAL
WT STONE: 31 MG

## 2021-05-29 ENCOUNTER — HOSPITAL ENCOUNTER (OUTPATIENT)
Facility: HOSPITAL | Age: 76
Discharge: NURSING, REHAB OR LONG-TERM CARE | End: 2021-06-01
Attending: EMERGENCY MEDICINE | Admitting: STUDENT IN AN ORGANIZED HEALTH CARE EDUCATION/TRAINING PROGRAM

## 2021-05-29 DIAGNOSIS — Z87.19 HISTORY OF DYSPHAGIA: ICD-10-CM

## 2021-05-29 DIAGNOSIS — Z86.69 HISTORY OF HEMIPARESIS: ICD-10-CM

## 2021-05-29 DIAGNOSIS — Z87.898 HISTORY OF APHASIA: ICD-10-CM

## 2021-05-29 DIAGNOSIS — T85.528A DISLODGED GASTROSTOMY TUBE: Primary | ICD-10-CM

## 2021-05-29 DIAGNOSIS — Z93.1 GASTROSTOMY TUBE DEPENDENT (HCC): ICD-10-CM

## 2021-05-29 PROBLEM — F01.50 VASCULAR DEMENTIA (HCC): Status: ACTIVE | Noted: 2021-05-29

## 2021-05-29 LAB
ALBUMIN SERPL-MCNC: 4.4 G/DL (ref 3.5–5.2)
ALBUMIN/GLOB SERPL: 1.3 G/DL
ALP SERPL-CCNC: 70 U/L (ref 39–117)
ALT SERPL W P-5'-P-CCNC: 17 U/L (ref 1–41)
ANION GAP SERPL CALCULATED.3IONS-SCNC: 7.4 MMOL/L (ref 5–15)
AST SERPL-CCNC: 22 U/L (ref 1–40)
BASOPHILS # BLD AUTO: 0.03 10*3/MM3 (ref 0–0.2)
BASOPHILS NFR BLD AUTO: 0.4 % (ref 0–1.5)
BILIRUB SERPL-MCNC: 0.3 MG/DL (ref 0–1.2)
BUN SERPL-MCNC: 17 MG/DL (ref 8–23)
BUN/CREAT SERPL: 23.3 (ref 7–25)
CALCIUM SPEC-SCNC: 9.9 MG/DL (ref 8.6–10.5)
CHLORIDE SERPL-SCNC: 105 MMOL/L (ref 98–107)
CO2 SERPL-SCNC: 29.6 MMOL/L (ref 22–29)
CREAT SERPL-MCNC: 0.73 MG/DL (ref 0.76–1.27)
DEPRECATED RDW RBC AUTO: 40.3 FL (ref 37–54)
EOSINOPHIL # BLD AUTO: 0.09 10*3/MM3 (ref 0–0.4)
EOSINOPHIL NFR BLD AUTO: 1.2 % (ref 0.3–6.2)
ERYTHROCYTE [DISTWIDTH] IN BLOOD BY AUTOMATED COUNT: 13.9 % (ref 12.3–15.4)
GFR SERPL CREATININE-BSD FRML MDRD: 127 ML/MIN/1.73
GLOBULIN UR ELPH-MCNC: 3.3 GM/DL
GLUCOSE SERPL-MCNC: 101 MG/DL (ref 65–99)
HCT VFR BLD AUTO: 42.7 % (ref 37.5–51)
HGB BLD-MCNC: 14 G/DL (ref 13–17.7)
IMM GRANULOCYTES # BLD AUTO: 0.03 10*3/MM3 (ref 0–0.05)
IMM GRANULOCYTES NFR BLD AUTO: 0.4 % (ref 0–0.5)
LYMPHOCYTES # BLD AUTO: 1.43 10*3/MM3 (ref 0.7–3.1)
LYMPHOCYTES NFR BLD AUTO: 18.4 % (ref 19.6–45.3)
MCH RBC QN AUTO: 26.4 PG (ref 26.6–33)
MCHC RBC AUTO-ENTMCNC: 32.8 G/DL (ref 31.5–35.7)
MCV RBC AUTO: 80.6 FL (ref 79–97)
MONOCYTES # BLD AUTO: 0.63 10*3/MM3 (ref 0.1–0.9)
MONOCYTES NFR BLD AUTO: 8.1 % (ref 5–12)
NEUTROPHILS NFR BLD AUTO: 5.55 10*3/MM3 (ref 1.7–7)
NEUTROPHILS NFR BLD AUTO: 71.5 % (ref 42.7–76)
NRBC BLD AUTO-RTO: 0 /100 WBC (ref 0–0.2)
PLATELET # BLD AUTO: 293 10*3/MM3 (ref 140–450)
PMV BLD AUTO: 11.5 FL (ref 6–12)
POTASSIUM SERPL-SCNC: 4.3 MMOL/L (ref 3.5–5.2)
PROT SERPL-MCNC: 7.7 G/DL (ref 6–8.5)
RBC # BLD AUTO: 5.3 10*6/MM3 (ref 4.14–5.8)
SODIUM SERPL-SCNC: 142 MMOL/L (ref 136–145)
WBC # BLD AUTO: 7.76 10*3/MM3 (ref 3.4–10.8)

## 2021-05-29 PROCEDURE — C9803 HOPD COVID-19 SPEC COLLECT: HCPCS

## 2021-05-29 PROCEDURE — 99284 EMERGENCY DEPT VISIT MOD MDM: CPT

## 2021-05-29 PROCEDURE — 96361 HYDRATE IV INFUSION ADD-ON: CPT

## 2021-05-29 PROCEDURE — G0378 HOSPITAL OBSERVATION PER HR: HCPCS

## 2021-05-29 PROCEDURE — 96360 HYDRATION IV INFUSION INIT: CPT

## 2021-05-29 PROCEDURE — 80053 COMPREHEN METABOLIC PANEL: CPT | Performed by: EMERGENCY MEDICINE

## 2021-05-29 PROCEDURE — 85025 COMPLETE CBC W/AUTO DIFF WBC: CPT | Performed by: EMERGENCY MEDICINE

## 2021-05-29 PROCEDURE — U0004 COV-19 TEST NON-CDC HGH THRU: HCPCS | Performed by: EMERGENCY MEDICINE

## 2021-05-29 RX ORDER — PANTOPRAZOLE SODIUM 40 MG/10ML
40 INJECTION, POWDER, LYOPHILIZED, FOR SOLUTION INTRAVENOUS
Status: DISCONTINUED | OUTPATIENT
Start: 2021-05-30 | End: 2021-06-01 | Stop reason: HOSPADM

## 2021-05-29 RX ORDER — NICOTINE POLACRILEX 4 MG
15 LOZENGE BUCCAL
Status: DISCONTINUED | OUTPATIENT
Start: 2021-05-29 | End: 2021-06-01 | Stop reason: HOSPADM

## 2021-05-29 RX ORDER — LANOLIN ALCOHOL/MO/W.PET/CERES
3 CREAM (GRAM) TOPICAL NIGHTLY
COMMUNITY

## 2021-05-29 RX ORDER — ONDANSETRON 2 MG/ML
4 INJECTION INTRAMUSCULAR; INTRAVENOUS EVERY 6 HOURS PRN
Status: DISCONTINUED | OUTPATIENT
Start: 2021-05-29 | End: 2021-06-01 | Stop reason: HOSPADM

## 2021-05-29 RX ORDER — SODIUM CHLORIDE 9 MG/ML
75 INJECTION, SOLUTION INTRAVENOUS CONTINUOUS
Status: DISCONTINUED | OUTPATIENT
Start: 2021-05-29 | End: 2021-06-01 | Stop reason: HOSPADM

## 2021-05-29 RX ORDER — IPRATROPIUM BROMIDE AND ALBUTEROL SULFATE 2.5; .5 MG/3ML; MG/3ML
3 SOLUTION RESPIRATORY (INHALATION) EVERY 4 HOURS PRN
COMMUNITY

## 2021-05-29 RX ORDER — SODIUM CHLORIDE 0.9 % (FLUSH) 0.9 %
10 SYRINGE (ML) INJECTION EVERY 12 HOURS SCHEDULED
Status: DISCONTINUED | OUTPATIENT
Start: 2021-05-29 | End: 2021-06-01 | Stop reason: HOSPADM

## 2021-05-29 RX ORDER — DEXTROSE MONOHYDRATE 25 G/50ML
25 INJECTION, SOLUTION INTRAVENOUS
Status: DISCONTINUED | OUTPATIENT
Start: 2021-05-29 | End: 2021-06-01 | Stop reason: HOSPADM

## 2021-05-29 RX ORDER — OLANZAPINE 10 MG/1
5 INJECTION, POWDER, LYOPHILIZED, FOR SOLUTION INTRAMUSCULAR EVERY 8 HOURS PRN
Status: DISCONTINUED | OUTPATIENT
Start: 2021-05-29 | End: 2021-05-30

## 2021-05-29 RX ORDER — INSULIN LISPRO 100 [IU]/ML
0-7 INJECTION, SOLUTION INTRAVENOUS; SUBCUTANEOUS
Status: DISCONTINUED | OUTPATIENT
Start: 2021-05-30 | End: 2021-06-01 | Stop reason: HOSPADM

## 2021-05-29 RX ORDER — HUMAN INSULIN 100 [IU]/ML
0-10 INJECTION, SUSPENSION SUBCUTANEOUS EVERY 6 HOURS
Status: ON HOLD | COMMUNITY
End: 2021-10-11

## 2021-05-29 RX ORDER — L.ACID,PARA/B.BIFIDUM/S.THERM 8B CELL
1 CAPSULE ORAL DAILY
Status: ON HOLD | COMMUNITY
End: 2022-01-01

## 2021-05-29 RX ORDER — CHOLECALCIFEROL (VITAMIN D3) 1MM UNIT/G
1000 LIQUID (GRAM) MISCELLANEOUS DAILY
COMMUNITY

## 2021-05-29 RX ORDER — SODIUM CHLORIDE 0.9 % (FLUSH) 0.9 %
10 SYRINGE (ML) INJECTION AS NEEDED
Status: DISCONTINUED | OUTPATIENT
Start: 2021-05-29 | End: 2021-06-01 | Stop reason: HOSPADM

## 2021-05-29 RX ORDER — SODIUM CHLORIDE 9 MG/ML
125 INJECTION, SOLUTION INTRAVENOUS CONTINUOUS
Status: DISCONTINUED | OUTPATIENT
Start: 2021-05-29 | End: 2021-05-29

## 2021-05-29 RX ADMIN — SODIUM CHLORIDE, PRESERVATIVE FREE 10 ML: 5 INJECTION INTRAVENOUS at 21:54

## 2021-05-29 RX ADMIN — SODIUM CHLORIDE 125 ML/HR: 9 INJECTION, SOLUTION INTRAVENOUS at 19:11

## 2021-05-29 RX ADMIN — SODIUM CHLORIDE 75 ML/HR: 9 INJECTION, SOLUTION INTRAVENOUS at 21:54

## 2021-05-30 LAB
ANION GAP SERPL CALCULATED.3IONS-SCNC: 10.9 MMOL/L (ref 5–15)
BASOPHILS # BLD AUTO: 0.04 10*3/MM3 (ref 0–0.2)
BASOPHILS NFR BLD AUTO: 0.6 % (ref 0–1.5)
BUN SERPL-MCNC: 16 MG/DL (ref 8–23)
BUN/CREAT SERPL: 23.2 (ref 7–25)
CALCIUM SPEC-SCNC: 9.1 MG/DL (ref 8.6–10.5)
CHLORIDE SERPL-SCNC: 108 MMOL/L (ref 98–107)
CO2 SERPL-SCNC: 22.1 MMOL/L (ref 22–29)
CREAT SERPL-MCNC: 0.69 MG/DL (ref 0.76–1.27)
DEPRECATED RDW RBC AUTO: 40.6 FL (ref 37–54)
EOSINOPHIL # BLD AUTO: 0.06 10*3/MM3 (ref 0–0.4)
EOSINOPHIL NFR BLD AUTO: 1 % (ref 0.3–6.2)
ERYTHROCYTE [DISTWIDTH] IN BLOOD BY AUTOMATED COUNT: 13.9 % (ref 12.3–15.4)
GFR SERPL CREATININE-BSD FRML MDRD: 135 ML/MIN/1.73
GLUCOSE BLDC GLUCOMTR-MCNC: 112 MG/DL (ref 70–130)
GLUCOSE BLDC GLUCOMTR-MCNC: 112 MG/DL (ref 70–130)
GLUCOSE BLDC GLUCOMTR-MCNC: 130 MG/DL (ref 70–130)
GLUCOSE BLDC GLUCOMTR-MCNC: 89 MG/DL (ref 70–130)
GLUCOSE BLDC GLUCOMTR-MCNC: 98 MG/DL (ref 70–130)
GLUCOSE SERPL-MCNC: 127 MG/DL (ref 65–99)
HCT VFR BLD AUTO: 37.7 % (ref 37.5–51)
HGB BLD-MCNC: 12.3 G/DL (ref 13–17.7)
IMM GRANULOCYTES # BLD AUTO: 0.01 10*3/MM3 (ref 0–0.05)
IMM GRANULOCYTES NFR BLD AUTO: 0.2 % (ref 0–0.5)
INR PPP: 1.06 (ref 0.9–1.1)
LYMPHOCYTES # BLD AUTO: 1.91 10*3/MM3 (ref 0.7–3.1)
LYMPHOCYTES NFR BLD AUTO: 30.6 % (ref 19.6–45.3)
MCH RBC QN AUTO: 26.7 PG (ref 26.6–33)
MCHC RBC AUTO-ENTMCNC: 32.6 G/DL (ref 31.5–35.7)
MCV RBC AUTO: 82 FL (ref 79–97)
MONOCYTES # BLD AUTO: 0.7 10*3/MM3 (ref 0.1–0.9)
MONOCYTES NFR BLD AUTO: 11.2 % (ref 5–12)
NEUTROPHILS NFR BLD AUTO: 3.52 10*3/MM3 (ref 1.7–7)
NEUTROPHILS NFR BLD AUTO: 56.4 % (ref 42.7–76)
NRBC BLD AUTO-RTO: 0 /100 WBC (ref 0–0.2)
PLATELET # BLD AUTO: 253 10*3/MM3 (ref 140–450)
PMV BLD AUTO: 11.7 FL (ref 6–12)
POTASSIUM SERPL-SCNC: 4 MMOL/L (ref 3.5–5.2)
PROTHROMBIN TIME: 13.6 SECONDS (ref 11.7–14.2)
RBC # BLD AUTO: 4.6 10*6/MM3 (ref 4.14–5.8)
SARS-COV-2 ORF1AB RESP QL NAA+PROBE: NOT DETECTED
SODIUM SERPL-SCNC: 141 MMOL/L (ref 136–145)
WBC # BLD AUTO: 6.24 10*3/MM3 (ref 3.4–10.8)

## 2021-05-30 PROCEDURE — 80048 BASIC METABOLIC PNL TOTAL CA: CPT | Performed by: NURSE PRACTITIONER

## 2021-05-30 PROCEDURE — 99202 OFFICE O/P NEW SF 15 MIN: CPT | Performed by: SURGERY

## 2021-05-30 PROCEDURE — 96372 THER/PROPH/DIAG INJ SC/IM: CPT

## 2021-05-30 PROCEDURE — 82962 GLUCOSE BLOOD TEST: CPT

## 2021-05-30 PROCEDURE — G0378 HOSPITAL OBSERVATION PER HR: HCPCS

## 2021-05-30 PROCEDURE — 85610 PROTHROMBIN TIME: CPT | Performed by: NURSE PRACTITIONER

## 2021-05-30 PROCEDURE — 96374 THER/PROPH/DIAG INJ IV PUSH: CPT

## 2021-05-30 PROCEDURE — 85025 COMPLETE CBC W/AUTO DIFF WBC: CPT | Performed by: NURSE PRACTITIONER

## 2021-05-30 PROCEDURE — 96361 HYDRATE IV INFUSION ADD-ON: CPT

## 2021-05-30 RX ORDER — OLANZAPINE 10 MG/1
5 INJECTION, POWDER, LYOPHILIZED, FOR SOLUTION INTRAMUSCULAR EVERY 6 HOURS PRN
Status: DISCONTINUED | OUTPATIENT
Start: 2021-05-30 | End: 2021-06-01 | Stop reason: HOSPADM

## 2021-05-30 RX ADMIN — PANTOPRAZOLE SODIUM 40 MG: 40 INJECTION, POWDER, FOR SOLUTION INTRAVENOUS at 06:02

## 2021-05-30 RX ADMIN — SODIUM CHLORIDE 75 ML/HR: 9 INJECTION, SOLUTION INTRAVENOUS at 06:02

## 2021-05-30 RX ADMIN — SODIUM CHLORIDE 75 ML/HR: 9 INJECTION, SOLUTION INTRAVENOUS at 19:41

## 2021-05-30 RX ADMIN — OLANZAPINE 5 MG: 10 INJECTION, POWDER, LYOPHILIZED, FOR SOLUTION INTRAMUSCULAR at 04:49

## 2021-05-30 RX ADMIN — OLANZAPINE 5 MG: 10 INJECTION, POWDER, LYOPHILIZED, FOR SOLUTION INTRAMUSCULAR at 23:22

## 2021-05-31 ENCOUNTER — ANESTHESIA (OUTPATIENT)
Dept: PERIOP | Facility: HOSPITAL | Age: 76
End: 2021-05-31

## 2021-05-31 ENCOUNTER — ANESTHESIA EVENT (OUTPATIENT)
Dept: PERIOP | Facility: HOSPITAL | Age: 76
End: 2021-05-31

## 2021-05-31 PROBLEM — Z87.19 HISTORY OF DYSPHAGIA: Status: ACTIVE | Noted: 2021-05-29

## 2021-05-31 LAB
ANION GAP SERPL CALCULATED.3IONS-SCNC: 9.4 MMOL/L (ref 5–15)
BASOPHILS # BLD AUTO: 0.03 10*3/MM3 (ref 0–0.2)
BASOPHILS NFR BLD AUTO: 0.6 % (ref 0–1.5)
BUN SERPL-MCNC: 15 MG/DL (ref 8–23)
BUN/CREAT SERPL: 24.2 (ref 7–25)
CALCIUM SPEC-SCNC: 9 MG/DL (ref 8.6–10.5)
CHLORIDE SERPL-SCNC: 111 MMOL/L (ref 98–107)
CO2 SERPL-SCNC: 20.6 MMOL/L (ref 22–29)
CREAT SERPL-MCNC: 0.62 MG/DL (ref 0.76–1.27)
DEPRECATED RDW RBC AUTO: 40 FL (ref 37–54)
EOSINOPHIL # BLD AUTO: 0.16 10*3/MM3 (ref 0–0.4)
EOSINOPHIL NFR BLD AUTO: 3.4 % (ref 0.3–6.2)
ERYTHROCYTE [DISTWIDTH] IN BLOOD BY AUTOMATED COUNT: 13.6 % (ref 12.3–15.4)
GFR SERPL CREATININE-BSD FRML MDRD: >150 ML/MIN/1.73
GLUCOSE BLDC GLUCOMTR-MCNC: 108 MG/DL (ref 70–130)
GLUCOSE BLDC GLUCOMTR-MCNC: 75 MG/DL (ref 70–130)
GLUCOSE BLDC GLUCOMTR-MCNC: 93 MG/DL (ref 70–130)
GLUCOSE BLDC GLUCOMTR-MCNC: 96 MG/DL (ref 70–130)
GLUCOSE SERPL-MCNC: 82 MG/DL (ref 65–99)
HCT VFR BLD AUTO: 36.2 % (ref 37.5–51)
HGB BLD-MCNC: 11.8 G/DL (ref 13–17.7)
IMM GRANULOCYTES # BLD AUTO: 0.01 10*3/MM3 (ref 0–0.05)
IMM GRANULOCYTES NFR BLD AUTO: 0.2 % (ref 0–0.5)
LYMPHOCYTES # BLD AUTO: 1.55 10*3/MM3 (ref 0.7–3.1)
LYMPHOCYTES NFR BLD AUTO: 32.8 % (ref 19.6–45.3)
MCH RBC QN AUTO: 26.7 PG (ref 26.6–33)
MCHC RBC AUTO-ENTMCNC: 32.6 G/DL (ref 31.5–35.7)
MCV RBC AUTO: 81.9 FL (ref 79–97)
MONOCYTES # BLD AUTO: 0.51 10*3/MM3 (ref 0.1–0.9)
MONOCYTES NFR BLD AUTO: 10.8 % (ref 5–12)
NEUTROPHILS NFR BLD AUTO: 2.46 10*3/MM3 (ref 1.7–7)
NEUTROPHILS NFR BLD AUTO: 52.2 % (ref 42.7–76)
NRBC BLD AUTO-RTO: 0 /100 WBC (ref 0–0.2)
PLATELET # BLD AUTO: 246 10*3/MM3 (ref 140–450)
PMV BLD AUTO: 11.9 FL (ref 6–12)
POTASSIUM SERPL-SCNC: 3.8 MMOL/L (ref 3.5–5.2)
RBC # BLD AUTO: 4.42 10*6/MM3 (ref 4.14–5.8)
SODIUM SERPL-SCNC: 141 MMOL/L (ref 136–145)
WBC # BLD AUTO: 4.72 10*3/MM3 (ref 3.4–10.8)

## 2021-05-31 PROCEDURE — 25010000003 LIDOCAINE 1 % SOLUTION: Performed by: SURGERY

## 2021-05-31 PROCEDURE — 96376 TX/PRO/DX INJ SAME DRUG ADON: CPT

## 2021-05-31 PROCEDURE — 80048 BASIC METABOLIC PNL TOTAL CA: CPT | Performed by: HOSPITALIST

## 2021-05-31 PROCEDURE — 25010000003 CEFAZOLIN IN DEXTROSE 2-4 GM/100ML-% SOLUTION: Performed by: SURGERY

## 2021-05-31 PROCEDURE — G0378 HOSPITAL OBSERVATION PER HR: HCPCS

## 2021-05-31 PROCEDURE — 82962 GLUCOSE BLOOD TEST: CPT

## 2021-05-31 PROCEDURE — 43246 EGD PLACE GASTROSTOMY TUBE: CPT | Performed by: SURGERY

## 2021-05-31 PROCEDURE — 25010000002 PHENYLEPHRINE PER 1 ML: Performed by: ANESTHESIOLOGY

## 2021-05-31 PROCEDURE — 25010000002 PROPOFOL 10 MG/ML EMULSION: Performed by: ANESTHESIOLOGY

## 2021-05-31 PROCEDURE — 85025 COMPLETE CBC W/AUTO DIFF WBC: CPT | Performed by: HOSPITALIST

## 2021-05-31 PROCEDURE — C1769 GUIDE WIRE: HCPCS | Performed by: SURGERY

## 2021-05-31 PROCEDURE — 25010000002 MORPHINE PER 10 MG: Performed by: SURGERY

## 2021-05-31 RX ORDER — LABETALOL HYDROCHLORIDE 5 MG/ML
5 INJECTION, SOLUTION INTRAVENOUS
Status: DISCONTINUED | OUTPATIENT
Start: 2021-05-31 | End: 2021-05-31 | Stop reason: HOSPADM

## 2021-05-31 RX ORDER — ONDANSETRON 2 MG/ML
4 INJECTION INTRAMUSCULAR; INTRAVENOUS ONCE AS NEEDED
Status: DISCONTINUED | OUTPATIENT
Start: 2021-05-31 | End: 2021-05-31 | Stop reason: HOSPADM

## 2021-05-31 RX ORDER — EPHEDRINE SULFATE 50 MG/ML
5 INJECTION, SOLUTION INTRAVENOUS ONCE AS NEEDED
Status: DISCONTINUED | OUTPATIENT
Start: 2021-05-31 | End: 2021-05-31 | Stop reason: HOSPADM

## 2021-05-31 RX ORDER — SODIUM CHLORIDE 0.9 % (FLUSH) 0.9 %
3 SYRINGE (ML) INJECTION EVERY 12 HOURS SCHEDULED
Status: DISCONTINUED | OUTPATIENT
Start: 2021-05-31 | End: 2021-05-31 | Stop reason: HOSPADM

## 2021-05-31 RX ORDER — LIDOCAINE HYDROCHLORIDE 20 MG/ML
INJECTION, SOLUTION INFILTRATION; PERINEURAL AS NEEDED
Status: DISCONTINUED | OUTPATIENT
Start: 2021-05-31 | End: 2021-05-31 | Stop reason: SURG

## 2021-05-31 RX ORDER — DIPHENHYDRAMINE HYDROCHLORIDE 50 MG/ML
6.25 INJECTION INTRAMUSCULAR; INTRAVENOUS
Status: DISCONTINUED | OUTPATIENT
Start: 2021-05-31 | End: 2021-05-31 | Stop reason: HOSPADM

## 2021-05-31 RX ORDER — MORPHINE SULFATE 2 MG/ML
2 INJECTION, SOLUTION INTRAMUSCULAR; INTRAVENOUS
Status: DISCONTINUED | OUTPATIENT
Start: 2021-05-31 | End: 2021-06-01 | Stop reason: HOSPADM

## 2021-05-31 RX ORDER — SODIUM CHLORIDE, SODIUM LACTATE, POTASSIUM CHLORIDE, CALCIUM CHLORIDE 600; 310; 30; 20 MG/100ML; MG/100ML; MG/100ML; MG/100ML
9 INJECTION, SOLUTION INTRAVENOUS CONTINUOUS
Status: DISCONTINUED | OUTPATIENT
Start: 2021-05-31 | End: 2021-05-31

## 2021-05-31 RX ORDER — NALOXONE HCL 0.4 MG/ML
0.4 VIAL (ML) INJECTION AS NEEDED
Status: DISCONTINUED | OUTPATIENT
Start: 2021-05-31 | End: 2021-05-31 | Stop reason: HOSPADM

## 2021-05-31 RX ORDER — LIDOCAINE HYDROCHLORIDE 10 MG/ML
0.5 INJECTION, SOLUTION EPIDURAL; INFILTRATION; INTRACAUDAL; PERINEURAL ONCE AS NEEDED
Status: DISCONTINUED | OUTPATIENT
Start: 2021-05-31 | End: 2021-05-31 | Stop reason: HOSPADM

## 2021-05-31 RX ORDER — PROPOFOL 10 MG/ML
VIAL (ML) INTRAVENOUS AS NEEDED
Status: DISCONTINUED | OUTPATIENT
Start: 2021-05-31 | End: 2021-05-31 | Stop reason: SURG

## 2021-05-31 RX ORDER — FAMOTIDINE 10 MG/ML
20 INJECTION, SOLUTION INTRAVENOUS ONCE
Status: DISCONTINUED | OUTPATIENT
Start: 2021-05-31 | End: 2021-05-31 | Stop reason: HOSPADM

## 2021-05-31 RX ORDER — FENTANYL CITRATE 50 UG/ML
25 INJECTION, SOLUTION INTRAMUSCULAR; INTRAVENOUS
Status: DISCONTINUED | OUTPATIENT
Start: 2021-05-31 | End: 2021-05-31 | Stop reason: HOSPADM

## 2021-05-31 RX ORDER — SODIUM CHLORIDE 0.9 % (FLUSH) 0.9 %
3-10 SYRINGE (ML) INJECTION AS NEEDED
Status: DISCONTINUED | OUTPATIENT
Start: 2021-05-31 | End: 2021-05-31 | Stop reason: HOSPADM

## 2021-05-31 RX ORDER — CEFAZOLIN SODIUM 2 G/100ML
2 INJECTION, SOLUTION INTRAVENOUS ONCE
Status: COMPLETED | OUTPATIENT
Start: 2021-05-31 | End: 2021-05-31

## 2021-05-31 RX ORDER — LIDOCAINE HYDROCHLORIDE 10 MG/ML
INJECTION, SOLUTION INFILTRATION; PERINEURAL AS NEEDED
Status: DISCONTINUED | OUTPATIENT
Start: 2021-05-31 | End: 2021-05-31 | Stop reason: HOSPADM

## 2021-05-31 RX ORDER — HYDRALAZINE HYDROCHLORIDE 20 MG/ML
10 INJECTION INTRAMUSCULAR; INTRAVENOUS
Status: DISCONTINUED | OUTPATIENT
Start: 2021-05-31 | End: 2021-05-31 | Stop reason: HOSPADM

## 2021-05-31 RX ADMIN — PANTOPRAZOLE SODIUM 40 MG: 40 INJECTION, POWDER, FOR SOLUTION INTRAVENOUS at 06:10

## 2021-05-31 RX ADMIN — PHENYLEPHRINE HYDROCHLORIDE 200 MCG: 10 INJECTION INTRAVENOUS at 11:59

## 2021-05-31 RX ADMIN — CEFAZOLIN SODIUM 2 G: 2 INJECTION, SOLUTION INTRAVENOUS at 11:33

## 2021-05-31 RX ADMIN — PROPOFOL 100 MCG/KG/MIN: 10 INJECTION, EMULSION INTRAVENOUS at 11:56

## 2021-05-31 RX ADMIN — SODIUM CHLORIDE, PRESERVATIVE FREE 10 ML: 5 INJECTION INTRAVENOUS at 19:49

## 2021-05-31 RX ADMIN — LIDOCAINE HYDROCHLORIDE 60 MG: 20 INJECTION, SOLUTION INFILTRATION; PERINEURAL at 11:56

## 2021-05-31 RX ADMIN — MORPHINE SULFATE 2 MG: 2 INJECTION, SOLUTION INTRAMUSCULAR; INTRAVENOUS at 19:50

## 2021-05-31 RX ADMIN — SODIUM CHLORIDE, POTASSIUM CHLORIDE, SODIUM LACTATE AND CALCIUM CHLORIDE 9 ML/HR: 600; 310; 30; 20 INJECTION, SOLUTION INTRAVENOUS at 11:33

## 2021-05-31 RX ADMIN — SODIUM CHLORIDE 75 ML/HR: 9 INJECTION, SOLUTION INTRAVENOUS at 14:49

## 2021-05-31 RX ADMIN — PROPOFOL 40 MG: 10 INJECTION, EMULSION INTRAVENOUS at 11:56

## 2021-05-31 RX ADMIN — SODIUM CHLORIDE 75 ML/HR: 9 INJECTION, SOLUTION INTRAVENOUS at 10:03

## 2021-05-31 NOTE — ANESTHESIA PREPROCEDURE EVALUATION
Anesthesia Evaluation     NPO Solid Status: Waived due to emergency  NPO Liquid Status: Waived due to emergency           Airway   No difficulty expected  Dental      Pulmonary    (+) a smoker Former, COPD,     ROS comment: Pneumonitis  PE comment: nonlabored  Cardiovascular     Rhythm: irregular  Rate: normal    (+) hypertension, past MI , CAD, hyperlipidemia,       Neuro/Psych  (+) seizures, CVA, weakness (hemiplegia/hemiparesis R-side), psychiatric history Anxiety, dementia,     GI/Hepatic/Renal/Endo    (+)  GERD, GI bleeding , hepatitis C, liver disease (Hep C), renal disease stones, ARF and CRI, diabetes mellitus type 2,     ROS Comment: Dislodged G-tube    Musculoskeletal     (+) neck pain,   Abdominal    Substance History      OB/GYN          Other        ROS/Med Hx Other: H/o COVID19 in Nov 2020                  Anesthesia Plan    ASA 4 - emergent     MAC   (Pt has No CPR order; multiple attempts to contact POA unsuccessful and pt unable to provide consent for any change in Code Status.)

## 2021-05-31 NOTE — ANESTHESIA POSTPROCEDURE EVALUATION
"Patient: Cam Gonsalves    Procedure Summary     Date: 05/31/21 Room / Location: [facility] OR  / [facility] MAIN OR    Anesthesia Start: 1135 Anesthesia Stop: 1216    Procedure: ESOPHAGOGASTRODUODENOSCOPY WITH PERCUTANEOUS ENDOSCOPIC GASTROSTOMY TUBE INSERTION (N/A Esophagus) Diagnosis:       Dislodged gastrostomy tube      History of dysphagia      (Dislodged gastrostomy tube [T85.528A])      (History of dysphagia [Z87.19])    Surgeons: Martell Ramirez Jr., MD Provider: Raghavendra Collins MD    Anesthesia Type: MAC ASA Status: 4 - Emergent          Anesthesia Type: MAC    Vitals  Vitals Value Taken Time   /83 05/31/21 1215   Temp     Pulse     Resp     SpO2 100 % 05/31/21 1216   Vitals shown include unvalidated device data.        Post Anesthesia Care and Evaluation    Patient location during evaluation: bedside  Level of consciousness: sleepy but conscious  Pain management: adequate  Airway patency: patent  Anesthetic complications: No anesthetic complications    Cardiovascular status: acceptable  Respiratory status: acceptable  Hydration status: acceptable    Comments: */90   Pulse 94   Temp 36.3 °C (97.4 °F) (Tympanic)   Resp 18   Ht 170.2 cm (67\")   Wt 54.9 kg (121 lb)   SpO2 (!) 74%   BMI 18.95 kg/m²         "

## 2021-06-01 VITALS
HEART RATE: 87 BPM | WEIGHT: 121 LBS | OXYGEN SATURATION: 93 % | HEIGHT: 67 IN | DIASTOLIC BLOOD PRESSURE: 79 MMHG | BODY MASS INDEX: 18.99 KG/M2 | SYSTOLIC BLOOD PRESSURE: 131 MMHG | TEMPERATURE: 98.3 F | RESPIRATION RATE: 16 BRPM

## 2021-06-01 PROBLEM — R09.89 PULMONARY VENOUS CONGESTION: Status: RESOLVED | Noted: 2021-02-24 | Resolved: 2021-06-01

## 2021-06-01 PROBLEM — T85.528A DISLODGED GASTROSTOMY TUBE: Status: RESOLVED | Noted: 2021-05-29 | Resolved: 2021-06-01

## 2021-06-01 LAB
GLUCOSE BLDC GLUCOMTR-MCNC: 111 MG/DL (ref 70–130)
GLUCOSE BLDC GLUCOMTR-MCNC: 142 MG/DL (ref 70–130)

## 2021-06-01 PROCEDURE — 82962 GLUCOSE BLOOD TEST: CPT

## 2021-06-01 PROCEDURE — G0378 HOSPITAL OBSERVATION PER HR: HCPCS

## 2021-06-01 PROCEDURE — 99212 OFFICE O/P EST SF 10 MIN: CPT | Performed by: SURGERY

## 2021-06-01 RX ORDER — HYDROCODONE BITARTRATE AND ACETAMINOPHEN 5; 325 MG/1; MG/1
1 TABLET ORAL EVERY 6 HOURS PRN
Qty: 12 TABLET | Refills: 0 | Status: SHIPPED | OUTPATIENT
Start: 2021-06-01 | End: 2021-06-01 | Stop reason: SDUPTHER

## 2021-06-01 RX ORDER — HYDROCODONE BITARTRATE AND ACETAMINOPHEN 5; 325 MG/1; MG/1
1 TABLET ORAL EVERY 6 HOURS PRN
Qty: 12 TABLET | Refills: 0 | Status: SHIPPED | OUTPATIENT
Start: 2021-06-01 | End: 2021-06-04

## 2021-06-01 RX ADMIN — PANTOPRAZOLE SODIUM 40 MG: 40 INJECTION, POWDER, FOR SOLUTION INTRAVENOUS at 05:33

## 2021-06-01 RX ADMIN — SODIUM CHLORIDE 75 ML/HR: 9 INJECTION, SOLUTION INTRAVENOUS at 03:57

## 2021-06-01 NOTE — PROGRESS NOTES
Chief Complaint:    S/P PEG, POD 1    Subjective:    The patient is stable and nonverbal.  He is tolerating tube feeds.    Objective:    Temp:  [97.1 °F (36.2 °C)-99.1 °F (37.3 °C)] 98.3 °F (36.8 °C)  Heart Rate:  [] 87  Resp:  [16-18] 16  BP: (114-153)/(64-96) 131/79    Physical Exam  Constitutional:       General: He is awake.      Appearance: He is not toxic-appearing.   Abdominal:      Palpations: Abdomen is soft.      Tenderness: There is no abdominal tenderness.      Comments: PEG site: No infection.         Results:    There are no new labs today.    Impression/Plan:    The patient is POD 1 from a PEG.  The patient is tolerating tube feeds.  I will sign off but remain available if needed.    Martell Ramirez Jr., M.D.

## 2021-06-01 NOTE — DISCHARGE SUMMARY
Patient Name: Cam Gonsalves  : 1945  MRN: [id number]    Date of Admission: 2021  Date of Discharge:  2021  Primary Care Physician: Enrique Carver MD      Chief Complaint:   g-tube replacement      Discharge Diagnoses     Active Hospital Problems    Diagnosis  POA   • Vascular dementia (CMS/HCC) [F01.50]  Yes   • History of dysphagia [Z87.19]  Yes   • GERD (gastroesophageal reflux disease) [K21.9]  Yes   • Hemiparesis affecting right side as late effect of cerebrovascular accident (CMS/[facility]) [I69.351]  Not Applicable   • Hypertension [I10]  Yes   • Hyperlipidemia [E78.5]  Yes   • Dysphagia due to old stroke [I69.391]  Not Applicable   • Type 2 diabetes mellitus with other specified complication (CMS/[facility]) [E11.69]  Yes      Resolved Hospital Problems    Diagnosis Date Resolved POA   • **Dislodged gastrostomy tube [T85.528A] 2021 Yes   • Pulmonary venous congestion [R09.89] 2021 Yes        Hospital Course     Mr. Gonsalves is a 75 y.o. male with a history of stroke with right hemiparesis and vascular dementia, with resultant significant dysphagia status post PEG tube, GERD, type 2 diabetes who presents from rehab facility after having pulled out his G-tube..  Please see the admitting history and physical for further details.  He was admitted to the hospital for surgical replacement.    Patient was seen in consultation by general surgery (Dr. Ramirez), who placed a new PEG via EGD on 2021.  The patient is now tolerating tube feeds, and ready for discharge back to his facility. Discharge diet recommended by our dietician: Diabetisource  cc bolus x 4 per day with 120 cc water flush Q bolus    Day of Discharge     Subjective:  No events overnight. Tolerating tube feeds.     Review of Systems   Unable to perform ROS: Dementia       Physical Exam:  Temp:  [97.1 °F (36.2 °C)-99.1 °F (37.3 °C)] 98.3 °F (36.8 °C)  Heart Rate:  [] 87  Resp:  [16-18] 16  BP:  (114-153)/(64-96) 131/79  Body mass index is 18.95 kg/m².  Physical Exam  Constitutional:       General: He is not in acute distress.     Appearance: He is ill-appearing.      Comments: Chronically ill appearing   Cardiovascular:      Rate and Rhythm: Normal rate and regular rhythm.      Heart sounds: Normal heart sounds.   Pulmonary:      Effort: Pulmonary effort is normal.      Breath sounds: Normal breath sounds.   Abdominal:      General: Bowel sounds are normal.      Palpations: Abdomen is soft.      Comments: gtube in place   Musculoskeletal:         General: No tenderness.      Right lower leg: No edema.      Left lower leg: No edema.   Neurological:      Mental Status: Mental status is at baseline. He is disoriented.         Consultants     Consult Orders (all) (From admission, onward)     Start     Ordered    05/31/21 2243  Inpatient Consult to Nutrition Services  Once     Provider:  (Not yet assigned)    05/31/21 2244 05/29/21 2117  Inpatient General Surgery Consult  Once     Specialty:  General Surgery  Provider:  Preston Munoz MD    05/29/21 2117 05/29/21 2059  Inpatient Case Management  Consult  Once     Provider:  (Not yet assigned)    05/29/21 2058 05/29/21 1900  [facility] (on-call MD unless specified) Details  Once     Specialty:  Hospitalist  Provider:  (Not yet assigned)    05/29/21 1859              Procedures     Imaging Results (All)     None          Pertinent Labs     Results from last 7 days   Lab Units 05/31/21  0530 05/30/21  0845 05/29/21  1852   WBC 10*3/mm3 4.72 6.24 7.76   HEMOGLOBIN g/dL 11.8* 12.3* 14.0   PLATELETS 10*3/mm3 246 253 293     Results from last 7 days   Lab Units 05/31/21  0530 05/30/21  0845 05/29/21  1852   SODIUM mmol/L 141 141 142   POTASSIUM mmol/L 3.8 4.0 4.3   CHLORIDE mmol/L 111* 108* 105   CO2 mmol/L 20.6* 22.1 29.6*   BUN mg/dL 15 16 17   CREATININE mg/dL 0.62* 0.69* 0.73*   GLUCOSE mg/dL 82 127* 101*   Estimated Creatinine  Clearance: 61 mL/min (A) (by C-G formula based on SCr of 0.62 mg/dL (L)).  Results from last 7 days   Lab Units 05/29/21  1852   ALBUMIN g/dL 4.40   BILIRUBIN mg/dL 0.3   ALK PHOS U/L 70   AST (SGOT) U/L 22   ALT (SGPT) U/L 17     Results from last 7 days   Lab Units 05/31/21  0530 05/30/21  0845 05/29/21  1852   CALCIUM mg/dL 9.0 9.1 9.9   ALBUMIN g/dL  --   --  4.40               Invalid input(s): LDLCALC        Test Results Pending at Discharge       Discharge Details        Discharge Medications      Continue These Medications      Instructions Start Date   clonazePAM 0.5 MG tablet  Commonly known as: KlonoPIN   0.5 mg, Per G Tube, Nightly PRN      HYDROcodone-acetaminophen 5-325 MG per tablet  Commonly known as: NORCO   1 tablet, Per G Tube, Every 6 Hours PRN      ipratropium-albuterol 0.5-2.5 mg/3 ml nebulizer  Commonly known as: DUO-NEB   3 mL, Nebulization, Every 4 Hours PRN      lactobacillus acidophilus capsule capsule   1 capsule, Per G Tube, Daily      lansoprazole 3 mg/mL in sodium bicarbonate injection 8.4%   30 mg, Per G Tube, Daily      melatonin 3 MG tablet   3 mg, Per G Tube, Daily With Dinner      NovoLIN N FlexPen 100 UNIT/ML injection  Generic drug: Insulin NPH (Human) (Isophane)   0-10 Units, Subcutaneous, Every 6 Hours, If -200 = 2 units, 201-250 = 4 units, 251-300 = 6 units, 301-350 = 8 units, 351-400 = 10 units, if greater than 400 or less than 60 notify MD      POLYETHYLENE GLYCOL 3350 PO   17 g, Per G Tube, Daily PRN      Vitamin D3 liquid   1,000 Units, Per G Tube, Daily             No Known Allergies      Discharge Disposition:  Skilled Nursing Facility (DC - External)    Discharge Diet:  Diet Order   Procedures   • NPO Diet NPO Except: Tube Feeding       Discharge Activity:   Activity Instructions     Activity as Tolerated            CODE STATUS:    Code Status and Medical Interventions:   Ordered at: 05/30/21 0147     Limited Support to NOT Include:     Cardioversion/Defibrillation    Intubation     Code Status:    No CPR     Medical Interventions (Level of Support Prior to Arrest):    Limited       No future appointments.  Additional Instructions for the Follow-ups that You Need to Schedule     Discharge Follow-up with PCP   As directed       Currently Documented PCP:    Enrique Carver MD    PCP Phone Number:    [phone]     Follow Up Details: 1-2 weeks after discharge           Follow-up Information     Enrique Carver MD .    Specialty: Family Medicine  Why: 1-2 weeks after discharge  [facility] information[facility]  [facility] [place] [id number]  [phone]                   Additional Instructions for the Follow-ups that You Need to Schedule     Discharge Follow-up with PCP   As directed       Currently Documented PCP:    Enrique Carver MD    PCP Phone Number:    [phone]     Follow Up Details: 1-2 weeks after discharge           Time Spent on Discharge:  Greater than 30 minutes      Damaso Durbin MD  [facility]  06/01/21  09:02 EDT

## 2021-06-01 NOTE — PROGRESS NOTES
Discharge Planning Assessment  [facility]     Patient Name: Cam Gonsalves  MRN: [id number]  Today's Date: 6/1/2021    Admit Date: 5/29/2021    Discharge Needs Assessment    No documentation.       Discharge Plan     Row Name 06/01/21 1015       Plan    Plan Comments  Per Shannan/Zia they can accept him back today to his medicaid bed. OZZY Rivera RN ,CCP.        Continued Care and Services - Admitted Since 5/29/2021     Destination     Service Provider Request Status Selected Services Address Phone Fax Patient Preferred    [facility]  Pending - Request Sent N/A [address] [facility] [address] [phone] --            Selected Continued Care - Prior Encounters Includes selections from prior encounters from 2/28/2021 to 6/1/2021    Discharged on 3/2/2021 Admission date: 2/24/2021 - Discharge disposition: Skilled Nursing Facility (DC - External)    Destination     Service Provider Selected Services Address Phone Fax Patient Preferred    [facility]  Skilled Nursing [address] [facility] [address] [phone] --                    Expected Discharge Date and Time     Expected Discharge Date Expected Discharge Time    Jun 1, 2021         Demographic Summary    No documentation.       Functional Status    No documentation.       Psychosocial    No documentation.       Abuse/Neglect    No documentation.       Legal    No documentation.       Substance Abuse    No documentation.       Patient Forms    No documentation.           Rea Rivera RN

## 2021-06-01 NOTE — PROGRESS NOTES
Nutrition Services    Patient Name:  Cam Gonsalves  YOB: 1945  MRN: [id number]  Admit Date:  5/29/2021    Consult for TF assessment.     Please see note from 5/31 for TF assessment/recommendations.     PEG placed yesterday and TF have started - goal rate 55 cc/hr with Diabetisource AC, 20 cc water flushes hourly.     Addendum: Plan is to dc back to LTC facility today. Recommendations for bolus regimen desired. Recommendation: Diabetisource  cc bolus x 4 per day with 120 cc water flush Q bolus.     RD to follow tolerance, labs.           Electronically signed by:  Genesis Patten RD  06/01/21 09:01 EDT

## 2021-06-01 NOTE — PROGRESS NOTES
Discharge Planning Assessment  [facility]     Patient Name: Cam Gonsalves  MRN: [id number]  Today's Date: 6/1/2021    Admit Date: 5/29/2021    Discharge Needs Assessment    No documentation.       Discharge Plan     Row Name 06/01/21 1120       Plan    Plan Comments  Spoke with the patient's niece/Radha and she was agreeable to discharge plans. [facility] is set up for noon today to transport the patient back to [place], medicaid bed, . OZZY Rivera RN, CCP.    Final Discharge Disposition Code  04 - intermediate care facility    Final Note  Discharged to [place], IC medicaid bed on 6/1/21 by [facility]. OZZY Rivera RN CCP.    Row Name 06/01/21 1015       Plan    Plan Comments  Per Shannan/Zia they can accept him back today to his medicaid bed. OZZY Rivera RN ,CCP.        Continued Care and Services - Admitted Since 5/29/2021     Destination Coordination complete    Service Provider Request Status Selected Services Address Phone Fax Patient Preferred    [facility]   Selected Intermediate Care [address] [facility] [address] [phone] --            Selected Continued Care - Prior Encounters Includes selections from prior encounters from 2/28/2021 to 6/1/2021    Discharged on 3/2/2021 Admission date: 2/24/2021 - Discharge disposition: Skilled Nursing Facility (DC - External)    Destination     Service Provider Selected Services Address Phone Fax Patient Preferred    [facility]  Skilled Nursing [address] [facility] [address] [phone] --                    Expected Discharge Date and Time     Expected Discharge Date Expected Discharge Time    Jun 1, 2021         Demographic Summary    No documentation.       Functional Status    No documentation.       Psychosocial    No documentation.       Abuse/Neglect    No documentation.       Legal    No documentation.       Substance Abuse    No  documentation.       Patient Forms    No documentation.           Rea Rivera RN

## 2021-06-01 NOTE — PLAN OF CARE
Goal Outcome Evaluation:  Plan of Care Reviewed With: patient  Progress: improving  Outcome Summary: pt is alert, nonverbal, room air, no falls, bed alarm on, turn, IV fluids, medicated once for pain, tube feeds continued, tolerating well, continue to monitor

## 2021-06-01 NOTE — DISCHARGE PLACEMENT REQUEST
"Noam Gonsalves (75 y.o. 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)   Code Status: No CPR    Ht: 170.2 cm (67\")   Wt: 54.9 kg (121 lb)    Admission Cmt: None   Principal Problem: Dislodged gastrostomy tube [T85.528A]                 Active Insurance as of 5/29/2021     Primary Coverage     Payor Plan Insurance Group Employer/Plan Group    ANTHEM MEDICARE REPLACEMENT ANTHEM MEDICARE ADVANTAGE [id number]     Payor Plan Address Payor Plan Phone Number Payor Plan Fax Number Effective Dates    [address] [phone]  3/1/2019 - None Entered    [facility] [address]       Subscriber Name Subscriber Birth Date Member ID       NOAM GONSALVES 1945 [id number]           Secondary Coverage     Payor Plan Insurance Group Employer/Plan Group    [place]      Payor Plan Address Payor Plan Phone Number Payor Plan Fax Number Effective Dates    [address] [phone]  2/24/2021 - None Entered    [facility] [id number]       Subscriber Name Subscriber Birth Date Member ID       NOAM GONSALVES 1945 [id number]                 Emergency Contacts      (Rel.) Home Phone Work Phone Mobile Phone    BOYD,(NIECE)TARUN (Relative) [phone] -- --              "

## 2021-06-01 NOTE — PROGRESS NOTES
"Physicians Statement of Medical Necessity for  Ambulance Transportation    GENERAL INFORMATION     Name: Cam Gonsalves  YOB: 1945  Medicare #:  [place] Medicare replacement #[id number] AND [place] MEDICAID #[id number]   Transport Date: 6/1/21 (Valid for round trips this date, or for scheduled repetitive trips for 60 days from the date signed below.)  Origin: [address]  Destination: [facility], [address] [place] [address]  Is the Patient's stay covered under Medicare Part A (PPS/DRG?)Yes  Closest appropriate facility? Yes  If this a hosp-hosp transfer? No  Is this a hospice patient? No    MEDICAL NECESSITY QUESTIONAIRE    Ambulance Transportation is medically necessary only if other means of transportation are contraindicated or would be potentially harmful to the patient.  To meet this requirement, the patient must be either \"bed confined\" or suffer from a condition such that transport by means other than an ambulance is contraindicated by the patient's condition.  The following questions must be answered by the healthcare professional signing below for this form to be valid:     1) Describe the MEDICAL CONDITION (physical and/or mental) of this patient AT THE TIME OF AMBULANCE TRANSPORT that requires the patient to be transported in an ambulance, and why transport by other means is contraindicated by the patient's condition: stable  Past Medical History:   Diagnosis Date   • Anxiety    • Aphasia    • BPH (benign prostatic hyperplasia)    • Cerebral infarction (CMS/HCC)    • Cervicalgia    • Chronic viral hepatitis C (CMS/HCC)    • COPD (chronic obstructive pulmonary disease) (CMS/HCC)    • Coronary artery disease    • Diabetes mellitus (CMS/HCC)    • Dysphagia, oropharyngeal phase    • Enterocolitis    • Gonzalez catheter in place    • G tube feedings (CMS/HCC)     JEVITY 1.2 AT 60 CC/HR   • Gastrointestinal hemorrhage    • Gastrostomy " "present (CMS/[facility])    • Gastrostomy tube in place (CMS/[facility])    • Hemiplegia and hemiparesis following cerebral infarction affecting right dominant side (CMS/HCC)    • History of COVID-19     11/2020   • History of MRSA infection    • History of sepsis    • Hydronephrosis    • Hyperlipidemia    • Hypertension    • Kidney failure, acute (CMS/HCC)    • Kidney stone    • Myocardial infarction (CMS/HCC)    • Other seizures (CMS/HCC)    • Pneumonitis    • Repeated falls    • Rhabdomyolysis    • Seizure (CMS/HCC)    • Stroke (CMS/[facility])    • Unspecified severe protein-calorie malnutrition (CMS/[facility])       Past Surgical History:   Procedure Laterality Date   • CYSTOSCOPY W/ URETERAL STENT PLACEMENT Left 2/25/2021    Procedure: CYSTOSCOPY left retrograde pyelogram, left URETERAL STENT INSERTION;  Surgeon: Rick Charles Jr., MD;  Location: [facility];  Service: Urology;  Laterality: Left;   • GTUBE INSERTION     • URETEROSCOPY LASER LITHOTRIPSY WITH STENT INSERTION Left 3/25/2021    Procedure: CYSTOSCOPY URETEROSCOPY LASER LITHOTRIPSY STONE BASKET EXTRACION STENT EXCHANGE;  Surgeon: Rick Charles Jr., MD;  Location: [facility];  Service: Urology;  Laterality: Left;      2) Is this patient \"bed confined\" as defined below?Yes   To be \"bed confined\" the patient must satisfy all three of the following criteria:  (1) unable to get up from bed without assistance; AND (2) unable to ambulate;  AND (3) unable to sit in a chair or wheelchair.  3) Can this patient safely be transported by car or wheelchair van (I.e., may safely sit during transport, without an attendant or monitoring?)No   4. In addition to completing questions 1-3 above, please check any of the following conditions that apply*:          *Note: supporting documentation for any boxes checked must be maintained in the patient's medical records Unable to tolerate seated position for time needed to transport      SIGNATURE OF PHYSICIAN OR OTHER AUTHORIZED " HEALTHCARE PROFESSIONAL    I certify that the above information is true and correct based on my evaluation of this patient, and represent that the patient requires transport by ambulance and that other forms of transport are contraindicated.  I understand that this information will be used by the Centers for Medicare and Medicaid Services (CMS) to support the determiniation of medical necessity for ambulance services, and I represent that I have personal knowledge of the patient's condition at the time of transport.       If this box is checked, I also certify that the patient is physically or mentally incapable of signing the ambulance service's claim form and that the institution with which I am affiliated has furnished care, services or assistance to the patient.  My signature below is made on behalf of the patient pursuant to 42 .36(b)(4). In accordance with 42 .37, the specific reason(s) that the patient is physically or mentally incapable of signing the claim for is as follows:     Signature of Physician or Healthcare Professional  Date/Time: 6/1/21 at noon     (For Scheduled repetitive transport, this form is not valid for transports performed more than 60 days after this date).                                                                                                                                            --------------------------------------------------------------------------------------------  Printed Name and Credentials of Physician or Authorized Healthcare Professional     *Form must be signed by patient's attending physician for scheduled, repetitive transports,.  For non-repetitive ambulance transports, if unable to obtain the signature of the attending physician, any of the following may sign (please select below):     Physician  Clinical Nurse Specialist  x Registered Nurse     Physician Assistant  Discharge Planner  Licensed Practical Nurse     Nurse Practitioner  Case  Manager

## 2021-10-02 ENCOUNTER — HOSPITAL ENCOUNTER (EMERGENCY)
Facility: HOSPITAL | Age: 76
Discharge: NURSING, REHAB OR LONG-TERM CARE | End: 2021-10-02
Attending: EMERGENCY MEDICINE | Admitting: EMERGENCY MEDICINE

## 2021-10-02 VITALS
OXYGEN SATURATION: 98 % | HEIGHT: 67 IN | SYSTOLIC BLOOD PRESSURE: 135 MMHG | TEMPERATURE: 98.1 F | DIASTOLIC BLOOD PRESSURE: 62 MMHG | BODY MASS INDEX: 19.15 KG/M2 | HEART RATE: 70 BPM | RESPIRATION RATE: 16 BRPM | WEIGHT: 122 LBS

## 2021-10-02 DIAGNOSIS — I10 HYPERTENSION, UNSPECIFIED TYPE: ICD-10-CM

## 2021-10-02 DIAGNOSIS — T85.598A CLOGGED FEEDING TUBE: Primary | ICD-10-CM

## 2021-10-02 DIAGNOSIS — Z79.4 TYPE 2 DIABETES MELLITUS WITHOUT COMPLICATION, WITH LONG-TERM CURRENT USE OF INSULIN (HCC): ICD-10-CM

## 2021-10-02 DIAGNOSIS — E11.9 TYPE 2 DIABETES MELLITUS WITHOUT COMPLICATION, WITH LONG-TERM CURRENT USE OF INSULIN (HCC): ICD-10-CM

## 2021-10-02 DIAGNOSIS — E78.5 HYPERLIPIDEMIA, UNSPECIFIED HYPERLIPIDEMIA TYPE: ICD-10-CM

## 2021-10-02 DIAGNOSIS — Z86.73 HISTORY OF CVA (CEREBROVASCULAR ACCIDENT): ICD-10-CM

## 2021-10-02 DIAGNOSIS — K21.9 GASTROESOPHAGEAL REFLUX DISEASE WITHOUT ESOPHAGITIS: ICD-10-CM

## 2021-10-02 PROCEDURE — 99283 EMERGENCY DEPT VISIT LOW MDM: CPT

## 2021-10-02 NOTE — ED PROVIDER NOTES
EMERGENCY DEPARTMENT ENCOUNTER    Room Number:  08/08  Date of encounter:  10/2/2021  PCP: Enrique Carver MD  Historian: EMS and nursing home records      HPI:  Chief Complaint: Feeding tube malfunction  A complete HPI/ROS/PMH/PSH/SH/FH are unobtainable due to: Patient condition    Context: Cam Gonsalves is a 76 y.o. male with past medical history of prior CVA, T2DM, HTN, HLD, GERD and history of GI bleed who presents to the ED from nursing home for evaluation of malfunctioning feeding tube.  Rehab facility states that they were unable to flush the patient's tube.  Patient is nonverbal and his neurologic status is at baseline.      PAST MEDICAL HISTORY  Active Ambulatory Problems     Diagnosis Date Noted   • Gastrointestinal hemorrhage 01/02/2020   • Clostridium difficile enterocolitis 01/02/2020   • Dysphagia due to old stroke 01/02/2020   • Type 2 diabetes mellitus with other specified complication ([facility]) 01/02/2020   • Long term current use of anticoagulant therapy 01/02/2020   • Severe malnutrition ([facility]) 01/03/2020   • Sepsis ([facility]) 02/24/2021   • GERD (gastroesophageal reflux disease) 02/24/2021   • Hemiparesis affecting right side as late effect of cerebrovascular accident ([facility]) 02/24/2021   • Abnormal urinalysis 02/24/2021   • Type 2 diabetes mellitus with hyperglycemia ([facility]) 02/24/2021   • Deviation of right eye 02/24/2021   • Hypertension    • Hyperlipidemia    • On tube feeding diet    • PEG (percutaneous endoscopic gastrostomy) status ([facility])    • E coli bacteremia 02/25/2021   • Left ureteral stone 02/25/2021   • Vascular dementia ([facility]) 05/29/2021   • History of dysphagia 05/29/2021     Resolved Ambulatory Problems     Diagnosis Date Noted   • Metabolic encephalopathy 02/24/2021   • Pulmonary venous congestion 02/24/2021   • Dislodged gastrostomy tube 05/29/2021     Past Medical History:   Diagnosis Date   • Anxiety    • Aphasia    • BPH (benign prostatic hyperplasia)    • Cerebral infarction  (CMS/HCC)    • Cervicalgia    • Chronic viral hepatitis C (CMS/HCC)    • COPD (chronic obstructive pulmonary disease) (CMS/HCC)    • Coronary artery disease    • Diabetes mellitus (CMS/HCC)    • Dysphagia, oropharyngeal phase    • Enterocolitis    • Gonzalez catheter in place    • G tube feedings (CMS/HCC)    • Gastrostomy present (CMS/HCC)    • Gastrostomy tube in place (CMS/HCC)    • Hemiplegia and hemiparesis following cerebral infarction affecting right dominant side (CMS/HCC)    • History of COVID-19    • History of MRSA infection    • History of sepsis    • Hydronephrosis    • Kidney failure, acute (CMS/HCC)    • Kidney stone    • Myocardial infarction (CMS/HCC)    • Other seizures (CMS/HCC)    • Pneumonitis    • Repeated falls    • Rhabdomyolysis    • Seizure (CMS/HCC)    • Stroke (CMS/HCC)    • Unspecified severe protein-calorie malnutrition (CMS/HCC)          PAST SURGICAL HISTORY  Past Surgical History:   Procedure Laterality Date   • CYSTOSCOPY W/ URETERAL STENT PLACEMENT Left 2/25/2021    Procedure: CYSTOSCOPY left retrograde pyelogram, left URETERAL STENT INSERTION;  Surgeon: Rick Charles Jr., MD;  Location: [facility];  Service: Urology;  Laterality: Left;   • ENDOSCOPY W/ PEG TUBE PLACEMENT N/A 5/31/2021    Procedure: ESOPHAGOGASTRODUODENOSCOPY WITH PERCUTANEOUS ENDOSCOPIC GASTROSTOMY TUBE INSERTION;  Surgeon: Martell Ramirez Jr., MD;  Location: [facility];  Service: General;  Laterality: N/A;   • GTUBE INSERTION     • URETEROSCOPY LASER LITHOTRIPSY WITH STENT INSERTION Left 3/25/2021    Procedure: CYSTOSCOPY URETEROSCOPY LASER LITHOTRIPSY STONE BASKET EXTRACION STENT EXCHANGE;  Surgeon: Rick Charles Jr., MD;  Location: [facility];  Service: Urology;  Laterality: Left;         FAMILY HISTORY  No family history on file.      SOCIAL HISTORY  Social History     Socioeconomic History   • Marital status:      Spouse name: Not on file   • Number of children: Not on file   •  "Years of education: Not on file   • Highest education level: Not on file   Tobacco Use   • Smoking status: Former Smoker   • Smokeless tobacco: Never Used   Substance and Sexual Activity   • Alcohol use: Not Currently   • Drug use: Not Currently     Comment: unknown - per guardian he did use \"about everything\"   • Sexual activity: Not Currently         ALLERGIES  Patient has no known allergies.        REVIEW OF SYSTEMS  Review of Systems   Limited due to patient condition       PHYSICAL EXAM    I have reviewed the triage vital signs and nursing notes.    ED Triage Vitals [10/02/21 0325]   Temp Heart Rate Resp BP SpO2   98.1 °F (36.7 °C) 67 16 138/61 97 %      Temp src Heart Rate Source Patient Position BP Location FiO2 (%)   Oral Monitor -- Right arm --       Physical Exam  GENERAL: Alert, thin/frail, not distressed  HENT: dry mucous membranes  EYES: no scleral icterus, PERRL, EOMI  CV: regular rhythm, regular rate  RESPIRATORY: normal effort  ABDOMEN: soft/nontender, feeding tube in place in the left abdomen  MUSCULOSKELETAL: no deformity  NEURO: alert, moves all extremities, follows commands  SKIN: warm, dry        LAB RESULTS  No results found for this or any previous visit (from the past 24 hour(s)).    Ordered the above labs and independently reviewed the results.        RADIOLOGY  No Radiology Exams Resulted Within Past 24 Hours    I ordered the above noted radiological studies. Reviewed by me and discussed with radiologist.  See dictation for official radiology interpretation.      PROCEDURES    Procedures      MEDICATIONS GIVEN IN ER    Medications - No data to display      PROGRESS, DATA ANALYSIS, CONSULTS, AND MEDICAL DECISION MAKING    All labs have been independently reviewed by me.  All radiology studies have been reviewed by me and discussed with radiologist dictating the report.   EKG's independently viewed and interpreted by me.  Discussion below represents my analysis of pertinent findings related " to patient's condition, differential diagnosis, treatment plan and final disposition.    DDx: Includes but not limited to feeding tube malfunction, feeding tube dislodgment         MDM: We were able to flush the feeding tube with water easily, the patient's belly is soft and nonsurgical, vital signs are stable, will return to the nursing home.    PPE: The patient wore a surgical mask throughout the entire patient encounter. I wore an N95.    AS OF 04:30 EDT VITALS:    BP - 138/61  HR - 67  TEMP - 98.1 °F (36.7 °C) (Oral)  O2 SATS - 97%        DIAGNOSIS  Final diagnoses:   Clogged feeding tube   History of CVA (cerebrovascular accident)   Type 2 diabetes mellitus without complication, with long-term current use of insulin ([facility])   Hypertension, unspecified type   Hyperlipidemia, unspecified hyperlipidemia type   Gastroesophageal reflux disease without esophagitis         DISPOSITION  DISCHARGE    Patient discharged in stable condition.    Reviewed implications of results, diagnosis, meds, responsibility to follow up, warning signs and symptoms of possible worsening, potential complications and reasons to return to ER.    Patient/Family voiced understanding of above instructions.    Discussed plan for discharge, as there is no emergent indication for admission. Patient referred to primary care provider for BP management due to today's BP. Pt/family is agreeable and understands need for follow up and repeat testing.  Pt is aware that discharge does not mean that nothing is wrong but it indicates no emergency is present that requires admission and they must continue care with follow-up as given below or physician of their choice.     FOLLOW-UP  Enrique Carver MD  [address] [facility] [address] [phone]    In 3 days           Medication List      No changes were made to your prescriptions during this visit.                    Rick Estrada PA  10/02/21 0831

## 2021-10-02 NOTE — ED NOTES
This RN spoke to [facility] and gave report to send patient back to facility.     Fabiola Mcgowan RN  10/02/21 0685

## 2021-10-02 NOTE — ED PROVIDER NOTES
MD ATTESTATION NOTE    The JUSTUS and I have discussed this patient's history, physical exam, and treatment plan.  I have reviewed the documentation and personally had a face to face interaction with the patient. I affirm the documentation and agree with the treatment and plan.  The attached note describes my personal findings.      Cam Gonsalves is a 76 y.o. male who presents to the ED from nursing home with malfunctioning feeding tube.  Patient is nonverbal and apparently at baseline per facility.  No other apparent complaints.      On exam:  No acute distress, normocephalic atraumatic, supple nontender, regular rate and rhythm, clear to auscultation bilaterally, soft nontender nondistended, moving all extremities    Labs  No results found for this or any previous visit (from the past 24 hour(s)).    Radiology  No Radiology Exams Resulted Within Past 24 Hours    Medical Decision Making:           PPE: Both the patient and I wore a surgical mask throughout the entire patient encounter. I wore protective goggles.     Diagnosis  Final diagnoses:   Clogged feeding tube   History of CVA (cerebrovascular accident)   Type 2 diabetes mellitus without complication, with long-term current use of insulin ([facility])   Hypertension, unspecified type   Hyperlipidemia, unspecified hyperlipidemia type   Gastroesophageal reflux disease without esophagitis        Sebastian Goddard MD  10/02/21 [id number]

## 2021-10-02 NOTE — DISCHARGE INSTRUCTIONS
Return to nursing home, resume all previous nursing home orders, follow up with your PCP in 3 to 5 days for recheck.  Notify PCP of condition in the morning.

## 2021-10-02 NOTE — ED NOTES
All appropriate PPE worn during entire encounter with patient.        Riya Troy, RN  10/02/21 0737

## 2021-10-09 ENCOUNTER — APPOINTMENT (OUTPATIENT)
Dept: GENERAL RADIOLOGY | Facility: HOSPITAL | Age: 76
End: 2021-10-09

## 2021-10-09 ENCOUNTER — APPOINTMENT (OUTPATIENT)
Dept: CT IMAGING | Facility: HOSPITAL | Age: 76
End: 2021-10-09

## 2021-10-09 ENCOUNTER — HOSPITAL ENCOUNTER (INPATIENT)
Facility: HOSPITAL | Age: 76
LOS: 3 days | Discharge: NURSING, REHAB OR LONG-TERM CARE | End: 2021-10-12
Attending: EMERGENCY MEDICINE | Admitting: HOSPITALIST

## 2021-10-09 DIAGNOSIS — G47.09 OTHER INSOMNIA: ICD-10-CM

## 2021-10-09 DIAGNOSIS — N30.00 ACUTE CYSTITIS WITHOUT HEMATURIA: ICD-10-CM

## 2021-10-09 DIAGNOSIS — G89.29 OTHER CHRONIC PAIN: ICD-10-CM

## 2021-10-09 DIAGNOSIS — N28.0 RENAL INFARCTION (HCC): Primary | ICD-10-CM

## 2021-10-09 PROBLEM — E11.9 DIABETES MELLITUS (HCC): Status: ACTIVE | Noted: 2020-01-02

## 2021-10-09 PROBLEM — N39.0 UTI (URINARY TRACT INFECTION): Status: ACTIVE | Noted: 2021-10-09

## 2021-10-09 LAB
ALBUMIN SERPL-MCNC: 4.1 G/DL (ref 3.5–5.2)
ALBUMIN/GLOB SERPL: 1.4 G/DL
ALP SERPL-CCNC: 51 U/L (ref 39–117)
ALT SERPL W P-5'-P-CCNC: 77 U/L (ref 1–41)
ANION GAP SERPL CALCULATED.3IONS-SCNC: 11 MMOL/L (ref 5–15)
AST SERPL-CCNC: 74 U/L (ref 1–40)
BACTERIA UR QL AUTO: ABNORMAL /HPF
BASOPHILS # BLD AUTO: 0.02 10*3/MM3 (ref 0–0.2)
BASOPHILS NFR BLD AUTO: 0.3 % (ref 0–1.5)
BILIRUB SERPL-MCNC: 0.3 MG/DL (ref 0–1.2)
BILIRUB UR QL STRIP: NEGATIVE
BUN SERPL-MCNC: 27 MG/DL (ref 8–23)
BUN/CREAT SERPL: 29.3 (ref 7–25)
CALCIUM SPEC-SCNC: 9.9 MG/DL (ref 8.6–10.5)
CHLORIDE SERPL-SCNC: 100 MMOL/L (ref 98–107)
CLARITY UR: ABNORMAL
CO2 SERPL-SCNC: 28 MMOL/L (ref 22–29)
COLOR UR: YELLOW
CREAT SERPL-MCNC: 0.92 MG/DL (ref 0.76–1.27)
DEPRECATED RDW RBC AUTO: 41.8 FL (ref 37–54)
EOSINOPHIL # BLD AUTO: 0.01 10*3/MM3 (ref 0–0.4)
EOSINOPHIL NFR BLD AUTO: 0.2 % (ref 0.3–6.2)
ERYTHROCYTE [DISTWIDTH] IN BLOOD BY AUTOMATED COUNT: 14.1 % (ref 12.3–15.4)
GFR SERPL CREATININE-BSD FRML MDRD: 97 ML/MIN/1.73
GLOBULIN UR ELPH-MCNC: 2.9 GM/DL
GLUCOSE SERPL-MCNC: 185 MG/DL (ref 65–99)
GLUCOSE UR STRIP-MCNC: NEGATIVE MG/DL
HCT VFR BLD AUTO: 46.3 % (ref 37.5–51)
HGB BLD-MCNC: 14.8 G/DL (ref 13–17.7)
HGB UR QL STRIP.AUTO: NEGATIVE
HYALINE CASTS UR QL AUTO: ABNORMAL /LPF
IMM GRANULOCYTES # BLD AUTO: 0.02 10*3/MM3 (ref 0–0.05)
IMM GRANULOCYTES NFR BLD AUTO: 0.3 % (ref 0–0.5)
KETONES UR QL STRIP: NEGATIVE
LEUKOCYTE ESTERASE UR QL STRIP.AUTO: ABNORMAL
LIPASE SERPL-CCNC: 10 U/L (ref 13–60)
LYMPHOCYTES # BLD AUTO: 0.6 10*3/MM3 (ref 0.7–3.1)
LYMPHOCYTES NFR BLD AUTO: 10 % (ref 19.6–45.3)
MCH RBC QN AUTO: 26.3 PG (ref 26.6–33)
MCHC RBC AUTO-ENTMCNC: 32 G/DL (ref 31.5–35.7)
MCV RBC AUTO: 82.2 FL (ref 79–97)
MONOCYTES # BLD AUTO: 1.07 10*3/MM3 (ref 0.1–0.9)
MONOCYTES NFR BLD AUTO: 17.7 % (ref 5–12)
NEUTROPHILS NFR BLD AUTO: 4.31 10*3/MM3 (ref 1.7–7)
NEUTROPHILS NFR BLD AUTO: 71.5 % (ref 42.7–76)
NITRITE UR QL STRIP: POSITIVE
NRBC BLD AUTO-RTO: 0 /100 WBC (ref 0–0.2)
PH UR STRIP.AUTO: 7.5 [PH] (ref 5–8)
PLATELET # BLD AUTO: 197 10*3/MM3 (ref 140–450)
PMV BLD AUTO: 11.9 FL (ref 6–12)
POTASSIUM SERPL-SCNC: 4.5 MMOL/L (ref 3.5–5.2)
PROT SERPL-MCNC: 7 G/DL (ref 6–8.5)
PROT UR QL STRIP: ABNORMAL
RBC # BLD AUTO: 5.63 10*6/MM3 (ref 4.14–5.8)
RBC # UR: ABNORMAL /HPF
REF LAB TEST METHOD: ABNORMAL
SARS-COV-2 RNA PNL SPEC NAA+PROBE: NOT DETECTED
SODIUM SERPL-SCNC: 139 MMOL/L (ref 136–145)
SP GR UR STRIP: 1.02 (ref 1–1.03)
SQUAMOUS #/AREA URNS HPF: ABNORMAL /HPF
UROBILINOGEN UR QL STRIP: ABNORMAL
WBC # BLD AUTO: 6.03 10*3/MM3 (ref 3.4–10.8)
WBC UR QL AUTO: ABNORMAL /HPF

## 2021-10-09 PROCEDURE — P9612 CATHETERIZE FOR URINE SPEC: HCPCS

## 2021-10-09 PROCEDURE — 25010000002 IOPAMIDOL 61 % SOLUTION: Performed by: EMERGENCY MEDICINE

## 2021-10-09 PROCEDURE — 74022 RADEX COMPL AQT ABD SERIES: CPT

## 2021-10-09 PROCEDURE — 87186 SC STD MICRODIL/AGAR DIL: CPT | Performed by: EMERGENCY MEDICINE

## 2021-10-09 PROCEDURE — 74177 CT ABD & PELVIS W/CONTRAST: CPT

## 2021-10-09 PROCEDURE — 25010000002 CEFTRIAXONE PER 250 MG: Performed by: NURSE PRACTITIONER

## 2021-10-09 PROCEDURE — 83690 ASSAY OF LIPASE: CPT | Performed by: NURSE PRACTITIONER

## 2021-10-09 PROCEDURE — 81001 URINALYSIS AUTO W/SCOPE: CPT | Performed by: NURSE PRACTITIONER

## 2021-10-09 PROCEDURE — 99284 EMERGENCY DEPT VISIT MOD MDM: CPT

## 2021-10-09 PROCEDURE — 87077 CULTURE AEROBIC IDENTIFY: CPT | Performed by: EMERGENCY MEDICINE

## 2021-10-09 PROCEDURE — 25010000002 ONDANSETRON PER 1 MG: Performed by: NURSE PRACTITIONER

## 2021-10-09 PROCEDURE — 80053 COMPREHEN METABOLIC PANEL: CPT | Performed by: NURSE PRACTITIONER

## 2021-10-09 PROCEDURE — 87635 SARS-COV-2 COVID-19 AMP PRB: CPT | Performed by: EMERGENCY MEDICINE

## 2021-10-09 PROCEDURE — 87086 URINE CULTURE/COLONY COUNT: CPT | Performed by: EMERGENCY MEDICINE

## 2021-10-09 PROCEDURE — 25010000002 ENOXAPARIN PER 10 MG: Performed by: HOSPITALIST

## 2021-10-09 PROCEDURE — 85025 COMPLETE CBC W/AUTO DIFF WBC: CPT | Performed by: NURSE PRACTITIONER

## 2021-10-09 RX ORDER — ROSUVASTATIN CALCIUM 10 MG/1
10 TABLET, COATED ORAL DAILY
Status: DISCONTINUED | OUTPATIENT
Start: 2021-10-09 | End: 2021-10-12 | Stop reason: HOSPADM

## 2021-10-09 RX ORDER — NITROGLYCERIN 0.4 MG/1
0.4 TABLET SUBLINGUAL
Status: DISCONTINUED | OUTPATIENT
Start: 2021-10-09 | End: 2021-10-12 | Stop reason: HOSPADM

## 2021-10-09 RX ORDER — GLIPIZIDE 2.5 MG/1
2.5 TABLET, EXTENDED RELEASE ORAL DAILY
Status: ON HOLD | COMMUNITY
End: 2021-10-11

## 2021-10-09 RX ORDER — HYDROCODONE BITARTRATE AND ACETAMINOPHEN 5; 325 MG/1; MG/1
1 TABLET ORAL EVERY 6 HOURS PRN
Status: DISCONTINUED | OUTPATIENT
Start: 2021-10-09 | End: 2021-10-12 | Stop reason: HOSPADM

## 2021-10-09 RX ORDER — POLYETHYLENE GLYCOL 3350 17 G/17G
17 POWDER, FOR SOLUTION ORAL DAILY
Status: DISCONTINUED | OUTPATIENT
Start: 2021-10-09 | End: 2021-10-12 | Stop reason: HOSPADM

## 2021-10-09 RX ORDER — ACETAMINOPHEN 160 MG/5ML
650 SOLUTION ORAL EVERY 4 HOURS PRN
Status: DISCONTINUED | OUTPATIENT
Start: 2021-10-09 | End: 2021-10-12 | Stop reason: HOSPADM

## 2021-10-09 RX ORDER — PANTOPRAZOLE SODIUM 40 MG/10ML
40 INJECTION, POWDER, LYOPHILIZED, FOR SOLUTION INTRAVENOUS
Status: DISCONTINUED | OUTPATIENT
Start: 2021-10-10 | End: 2021-10-12 | Stop reason: HOSPADM

## 2021-10-09 RX ORDER — NICOTINE POLACRILEX 4 MG
15 LOZENGE BUCCAL
Status: DISCONTINUED | OUTPATIENT
Start: 2021-10-09 | End: 2021-10-12 | Stop reason: HOSPADM

## 2021-10-09 RX ORDER — SODIUM CHLORIDE 9 MG/ML
50 INJECTION, SOLUTION INTRAVENOUS CONTINUOUS
Status: DISCONTINUED | OUTPATIENT
Start: 2021-10-09 | End: 2021-10-12 | Stop reason: HOSPADM

## 2021-10-09 RX ORDER — UREA 10 %
3 LOTION (ML) TOPICAL
Status: DISCONTINUED | OUTPATIENT
Start: 2021-10-09 | End: 2021-10-12 | Stop reason: HOSPADM

## 2021-10-09 RX ORDER — ROSUVASTATIN CALCIUM 10 MG/1
10 TABLET, COATED ORAL DAILY
COMMUNITY

## 2021-10-09 RX ORDER — CLONAZEPAM 0.5 MG/1
0.5 TABLET ORAL NIGHTLY PRN
Status: DISCONTINUED | OUTPATIENT
Start: 2021-10-09 | End: 2021-10-12 | Stop reason: HOSPADM

## 2021-10-09 RX ORDER — DEXTROSE MONOHYDRATE 25 G/50ML
25 INJECTION, SOLUTION INTRAVENOUS
Status: DISCONTINUED | OUTPATIENT
Start: 2021-10-09 | End: 2021-10-12 | Stop reason: HOSPADM

## 2021-10-09 RX ORDER — SODIUM CHLORIDE 0.9 % (FLUSH) 0.9 %
10 SYRINGE (ML) INJECTION AS NEEDED
Status: DISCONTINUED | OUTPATIENT
Start: 2021-10-09 | End: 2021-10-12 | Stop reason: HOSPADM

## 2021-10-09 RX ORDER — HYDROCODONE BITARTRATE AND ACETAMINOPHEN 5; 325 MG/1; MG/1
1 TABLET ORAL 2 TIMES DAILY
Status: ON HOLD | COMMUNITY
End: 2021-10-12 | Stop reason: SDUPTHER

## 2021-10-09 RX ORDER — ONDANSETRON 4 MG/1
4 TABLET, FILM COATED ORAL EVERY 6 HOURS PRN
Status: DISCONTINUED | OUTPATIENT
Start: 2021-10-09 | End: 2021-10-12 | Stop reason: HOSPADM

## 2021-10-09 RX ORDER — SODIUM CHLORIDE 0.9 % (FLUSH) 0.9 %
10 SYRINGE (ML) INJECTION EVERY 12 HOURS SCHEDULED
Status: DISCONTINUED | OUTPATIENT
Start: 2021-10-09 | End: 2021-10-12 | Stop reason: HOSPADM

## 2021-10-09 RX ORDER — IPRATROPIUM BROMIDE AND ALBUTEROL SULFATE 2.5; .5 MG/3ML; MG/3ML
3 SOLUTION RESPIRATORY (INHALATION) EVERY 4 HOURS PRN
Status: DISCONTINUED | OUTPATIENT
Start: 2021-10-09 | End: 2021-10-12 | Stop reason: HOSPADM

## 2021-10-09 RX ORDER — L.ACID,PARA/B.BIFIDUM/S.THERM 8B CELL
1 CAPSULE ORAL DAILY
Status: DISCONTINUED | OUTPATIENT
Start: 2021-10-09 | End: 2021-10-12 | Stop reason: HOSPADM

## 2021-10-09 RX ORDER — INSULIN LISPRO 100 [IU]/ML
0-7 INJECTION, SOLUTION INTRAVENOUS; SUBCUTANEOUS
Status: DISCONTINUED | OUTPATIENT
Start: 2021-10-09 | End: 2021-10-12 | Stop reason: HOSPADM

## 2021-10-09 RX ORDER — ONDANSETRON 2 MG/ML
4 INJECTION INTRAMUSCULAR; INTRAVENOUS ONCE
Status: COMPLETED | OUTPATIENT
Start: 2021-10-09 | End: 2021-10-09

## 2021-10-09 RX ORDER — ONDANSETRON 2 MG/ML
4 INJECTION INTRAMUSCULAR; INTRAVENOUS EVERY 6 HOURS PRN
Status: DISCONTINUED | OUTPATIENT
Start: 2021-10-09 | End: 2021-10-12 | Stop reason: HOSPADM

## 2021-10-09 RX ADMIN — ONDANSETRON 4 MG: 2 INJECTION INTRAMUSCULAR; INTRAVENOUS at 11:09

## 2021-10-09 RX ADMIN — SODIUM CHLORIDE 75 ML/HR: 9 INJECTION, SOLUTION INTRAVENOUS at 21:00

## 2021-10-09 RX ADMIN — IOPAMIDOL 85 ML: 612 INJECTION, SOLUTION INTRAVENOUS at 14:07

## 2021-10-09 RX ADMIN — Medication 1 CAPSULE: at 20:24

## 2021-10-09 RX ADMIN — ENOXAPARIN SODIUM 30 MG: 30 INJECTION SUBCUTANEOUS at 20:24

## 2021-10-09 RX ADMIN — CLONAZEPAM 0.5 MG: 0.5 TABLET ORAL at 20:24

## 2021-10-09 RX ADMIN — Medication 3 MG: at 20:24

## 2021-10-09 RX ADMIN — ROSUVASTATIN CALCIUM 10 MG: 10 TABLET, FILM COATED ORAL at 20:24

## 2021-10-09 RX ADMIN — CEFTRIAXONE 1 G: 1 INJECTION, POWDER, FOR SOLUTION INTRAMUSCULAR; INTRAVENOUS at 14:25

## 2021-10-09 RX ADMIN — SODIUM CHLORIDE, PRESERVATIVE FREE 10 ML: 5 INJECTION INTRAVENOUS at 20:25

## 2021-10-10 LAB
ANION GAP SERPL CALCULATED.3IONS-SCNC: 13 MMOL/L (ref 5–15)
BUN SERPL-MCNC: 22 MG/DL (ref 8–23)
BUN/CREAT SERPL: 24.2 (ref 7–25)
CALCIUM SPEC-SCNC: 9.1 MG/DL (ref 8.6–10.5)
CHLORIDE SERPL-SCNC: 104 MMOL/L (ref 98–107)
CO2 SERPL-SCNC: 26 MMOL/L (ref 22–29)
CREAT SERPL-MCNC: 0.91 MG/DL (ref 0.76–1.27)
DEPRECATED RDW RBC AUTO: 39.4 FL (ref 37–54)
ERYTHROCYTE [DISTWIDTH] IN BLOOD BY AUTOMATED COUNT: 13.6 % (ref 12.3–15.4)
GFR SERPL CREATININE-BSD FRML MDRD: 98 ML/MIN/1.73
GLUCOSE BLDC GLUCOMTR-MCNC: 111 MG/DL (ref 70–130)
GLUCOSE BLDC GLUCOMTR-MCNC: 114 MG/DL (ref 70–130)
GLUCOSE BLDC GLUCOMTR-MCNC: 125 MG/DL (ref 70–130)
GLUCOSE BLDC GLUCOMTR-MCNC: 131 MG/DL (ref 70–130)
GLUCOSE BLDC GLUCOMTR-MCNC: 93 MG/DL (ref 70–130)
GLUCOSE SERPL-MCNC: 114 MG/DL (ref 65–99)
HBA1C MFR BLD: 6.8 % (ref 4.8–5.6)
HCT VFR BLD AUTO: 40.8 % (ref 37.5–51)
HGB BLD-MCNC: 13.2 G/DL (ref 13–17.7)
LYMPHOCYTES # BLD MANUAL: 1.93 10*3/MM3 (ref 0.7–3.1)
LYMPHOCYTES NFR BLD MANUAL: 18 % (ref 5–12)
LYMPHOCYTES NFR BLD MANUAL: 20 % (ref 19.6–45.3)
MAGNESIUM SERPL-MCNC: 2 MG/DL (ref 1.6–2.4)
MCH RBC QN AUTO: 26.1 PG (ref 26.6–33)
MCHC RBC AUTO-ENTMCNC: 32.4 G/DL (ref 31.5–35.7)
MCV RBC AUTO: 80.6 FL (ref 79–97)
MONOCYTES # BLD AUTO: 1.74 10*3/MM3 (ref 0.1–0.9)
NEUTROPHILS # BLD AUTO: 5.98 10*3/MM3 (ref 1.7–7)
NEUTROPHILS NFR BLD MANUAL: 62 % (ref 42.7–76)
PHOSPHATE SERPL-MCNC: 2.9 MG/DL (ref 2.5–4.5)
PLAT MORPH BLD: NORMAL
PLATELET # BLD AUTO: 188 10*3/MM3 (ref 140–450)
PMV BLD AUTO: 12.2 FL (ref 6–12)
POTASSIUM SERPL-SCNC: 4.4 MMOL/L (ref 3.5–5.2)
RBC # BLD AUTO: 5.06 10*6/MM3 (ref 4.14–5.8)
RBC MORPH BLD: NORMAL
SODIUM SERPL-SCNC: 143 MMOL/L (ref 136–145)
WBC # BLD AUTO: 9.64 10*3/MM3 (ref 3.4–10.8)
WBC MORPH BLD: NORMAL

## 2021-10-10 PROCEDURE — 80048 BASIC METABOLIC PNL TOTAL CA: CPT | Performed by: HOSPITALIST

## 2021-10-10 PROCEDURE — 84100 ASSAY OF PHOSPHORUS: CPT | Performed by: HOSPITALIST

## 2021-10-10 PROCEDURE — 25010000002 INFLUENZA VAC SPLIT QUAD 0.5 ML SUSPENSION PREFILLED SYRINGE: Performed by: HOSPITALIST

## 2021-10-10 PROCEDURE — 85025 COMPLETE CBC W/AUTO DIFF WBC: CPT | Performed by: HOSPITALIST

## 2021-10-10 PROCEDURE — 90686 IIV4 VACC NO PRSV 0.5 ML IM: CPT | Performed by: HOSPITALIST

## 2021-10-10 PROCEDURE — 83735 ASSAY OF MAGNESIUM: CPT | Performed by: HOSPITALIST

## 2021-10-10 PROCEDURE — 82962 GLUCOSE BLOOD TEST: CPT

## 2021-10-10 PROCEDURE — 83036 HEMOGLOBIN GLYCOSYLATED A1C: CPT | Performed by: HOSPITALIST

## 2021-10-10 PROCEDURE — G0008 ADMIN INFLUENZA VIRUS VAC: HCPCS | Performed by: HOSPITALIST

## 2021-10-10 PROCEDURE — 25010000002 ENOXAPARIN PER 10 MG: Performed by: HOSPITALIST

## 2021-10-10 PROCEDURE — 85007 BL SMEAR W/DIFF WBC COUNT: CPT | Performed by: HOSPITALIST

## 2021-10-10 RX ADMIN — PANTOPRAZOLE SODIUM 40 MG: 40 INJECTION, POWDER, FOR SOLUTION INTRAVENOUS at 05:44

## 2021-10-10 RX ADMIN — SODIUM CHLORIDE 75 ML/HR: 9 INJECTION, SOLUTION INTRAVENOUS at 22:09

## 2021-10-10 RX ADMIN — Medication 1 CAPSULE: at 09:40

## 2021-10-10 RX ADMIN — SODIUM CHLORIDE 75 ML/HR: 9 INJECTION, SOLUTION INTRAVENOUS at 09:26

## 2021-10-10 RX ADMIN — ENOXAPARIN SODIUM 30 MG: 30 INJECTION SUBCUTANEOUS at 18:25

## 2021-10-10 RX ADMIN — POLYETHYLENE GLYCOL 3350 17 G: 17 POWDER, FOR SOLUTION ORAL at 09:40

## 2021-10-10 RX ADMIN — ACETAMINOPHEN ORAL SOLUTION 650 MG: 325 SOLUTION ORAL at 11:54

## 2021-10-10 RX ADMIN — Medication 3 MG: at 18:25

## 2021-10-10 RX ADMIN — SODIUM CHLORIDE, PRESERVATIVE FREE 10 ML: 5 INJECTION INTRAVENOUS at 21:13

## 2021-10-10 RX ADMIN — ROSUVASTATIN CALCIUM 10 MG: 10 TABLET, FILM COATED ORAL at 09:40

## 2021-10-10 RX ADMIN — INFLUENZA VIRUS VACCINE 0.5 ML: 15; 15; 15; 15 SUSPENSION INTRAMUSCULAR at 11:54

## 2021-10-11 LAB
ANION GAP SERPL CALCULATED.3IONS-SCNC: 10.1 MMOL/L (ref 5–15)
BACTERIA SPEC AEROBE CULT: ABNORMAL
BASOPHILS # BLD AUTO: 0.02 10*3/MM3 (ref 0–0.2)
BASOPHILS NFR BLD AUTO: 0.2 % (ref 0–1.5)
BUN SERPL-MCNC: 17 MG/DL (ref 8–23)
BUN/CREAT SERPL: 22.4 (ref 7–25)
CALCIUM SPEC-SCNC: 8.6 MG/DL (ref 8.6–10.5)
CHLORIDE SERPL-SCNC: 109 MMOL/L (ref 98–107)
CO2 SERPL-SCNC: 22.9 MMOL/L (ref 22–29)
CREAT SERPL-MCNC: 0.76 MG/DL (ref 0.76–1.27)
DEPRECATED RDW RBC AUTO: 38.4 FL (ref 37–54)
EOSINOPHIL # BLD AUTO: 0.04 10*3/MM3 (ref 0–0.4)
EOSINOPHIL NFR BLD AUTO: 0.4 % (ref 0.3–6.2)
ERYTHROCYTE [DISTWIDTH] IN BLOOD BY AUTOMATED COUNT: 13.5 % (ref 12.3–15.4)
GFR SERPL CREATININE-BSD FRML MDRD: 121 ML/MIN/1.73
GLUCOSE BLDC GLUCOMTR-MCNC: 105 MG/DL (ref 70–130)
GLUCOSE BLDC GLUCOMTR-MCNC: 108 MG/DL (ref 70–130)
GLUCOSE BLDC GLUCOMTR-MCNC: 110 MG/DL (ref 70–130)
GLUCOSE BLDC GLUCOMTR-MCNC: 94 MG/DL (ref 70–130)
GLUCOSE SERPL-MCNC: 90 MG/DL (ref 65–99)
HCT VFR BLD AUTO: 37.8 % (ref 37.5–51)
HGB BLD-MCNC: 12.3 G/DL (ref 13–17.7)
IMM GRANULOCYTES # BLD AUTO: 0.02 10*3/MM3 (ref 0–0.05)
IMM GRANULOCYTES NFR BLD AUTO: 0.2 % (ref 0–0.5)
LYMPHOCYTES # BLD AUTO: 1.66 10*3/MM3 (ref 0.7–3.1)
LYMPHOCYTES NFR BLD AUTO: 17.9 % (ref 19.6–45.3)
MCH RBC QN AUTO: 26 PG (ref 26.6–33)
MCHC RBC AUTO-ENTMCNC: 32.5 G/DL (ref 31.5–35.7)
MCV RBC AUTO: 79.9 FL (ref 79–97)
MONOCYTES # BLD AUTO: 1.42 10*3/MM3 (ref 0.1–0.9)
MONOCYTES NFR BLD AUTO: 15.3 % (ref 5–12)
NEUTROPHILS NFR BLD AUTO: 6.13 10*3/MM3 (ref 1.7–7)
NEUTROPHILS NFR BLD AUTO: 66 % (ref 42.7–76)
NRBC BLD AUTO-RTO: 0 /100 WBC (ref 0–0.2)
PLATELET # BLD AUTO: 186 10*3/MM3 (ref 140–450)
PMV BLD AUTO: 12.2 FL (ref 6–12)
POTASSIUM SERPL-SCNC: 3.8 MMOL/L (ref 3.5–5.2)
RBC # BLD AUTO: 4.73 10*6/MM3 (ref 4.14–5.8)
SODIUM SERPL-SCNC: 142 MMOL/L (ref 136–145)
WBC # BLD AUTO: 9.29 10*3/MM3 (ref 3.4–10.8)

## 2021-10-11 PROCEDURE — 25010000002 ENOXAPARIN PER 10 MG: Performed by: HOSPITALIST

## 2021-10-11 PROCEDURE — 85025 COMPLETE CBC W/AUTO DIFF WBC: CPT | Performed by: HOSPITALIST

## 2021-10-11 PROCEDURE — 80048 BASIC METABOLIC PNL TOTAL CA: CPT | Performed by: HOSPITALIST

## 2021-10-11 PROCEDURE — 82962 GLUCOSE BLOOD TEST: CPT

## 2021-10-11 RX ORDER — GLIPIZIDE 5 MG/1
2.5 TABLET ORAL DAILY
COMMUNITY

## 2021-10-11 RX ORDER — HYDROCODONE BITARTRATE AND ACETAMINOPHEN 5; 325 MG/1; MG/1
1 TABLET ORAL DAILY PRN
COMMUNITY
End: 2021-10-12 | Stop reason: HOSPADM

## 2021-10-11 RX ORDER — CEPHALEXIN 250 MG/5ML
250 POWDER, FOR SUSPENSION ORAL EVERY 6 HOURS SCHEDULED
Status: DISCONTINUED | OUTPATIENT
Start: 2021-10-11 | End: 2021-10-12 | Stop reason: HOSPADM

## 2021-10-11 RX ADMIN — SODIUM CHLORIDE, PRESERVATIVE FREE 10 ML: 5 INJECTION INTRAVENOUS at 20:19

## 2021-10-11 RX ADMIN — PANTOPRAZOLE SODIUM 40 MG: 40 INJECTION, POWDER, FOR SOLUTION INTRAVENOUS at 05:45

## 2021-10-11 RX ADMIN — ENOXAPARIN SODIUM 30 MG: 30 INJECTION SUBCUTANEOUS at 18:22

## 2021-10-11 RX ADMIN — Medication 1 CAPSULE: at 09:51

## 2021-10-11 RX ADMIN — CLONAZEPAM 0.5 MG: 0.5 TABLET ORAL at 20:07

## 2021-10-11 RX ADMIN — Medication 250 MG: at 20:07

## 2021-10-11 RX ADMIN — HYDROCODONE BITARTRATE AND ACETAMINOPHEN 1 TABLET: 5; 325 TABLET ORAL at 18:47

## 2021-10-11 RX ADMIN — Medication 3 MG: at 18:22

## 2021-10-11 RX ADMIN — ROSUVASTATIN CALCIUM 10 MG: 10 TABLET, FILM COATED ORAL at 09:51

## 2021-10-11 RX ADMIN — POLYETHYLENE GLYCOL 3350 17 G: 17 POWDER, FOR SOLUTION ORAL at 09:51

## 2021-10-12 VITALS
RESPIRATION RATE: 18 BRPM | OXYGEN SATURATION: 97 % | HEIGHT: 68 IN | WEIGHT: 126 LBS | TEMPERATURE: 98.6 F | HEART RATE: 59 BPM | DIASTOLIC BLOOD PRESSURE: 67 MMHG | SYSTOLIC BLOOD PRESSURE: 125 MMHG | BODY MASS INDEX: 19.1 KG/M2

## 2021-10-12 LAB
ANION GAP SERPL CALCULATED.3IONS-SCNC: 8.6 MMOL/L (ref 5–15)
BUN SERPL-MCNC: 17 MG/DL (ref 8–23)
BUN/CREAT SERPL: 26.6 (ref 7–25)
CALCIUM SPEC-SCNC: 8.2 MG/DL (ref 8.6–10.5)
CHLORIDE SERPL-SCNC: 111 MMOL/L (ref 98–107)
CO2 SERPL-SCNC: 24.4 MMOL/L (ref 22–29)
CREAT SERPL-MCNC: 0.64 MG/DL (ref 0.76–1.27)
GFR SERPL CREATININE-BSD FRML MDRD: 147 ML/MIN/1.73
GLUCOSE BLDC GLUCOMTR-MCNC: 120 MG/DL (ref 70–130)
GLUCOSE BLDC GLUCOMTR-MCNC: 120 MG/DL (ref 70–130)
GLUCOSE BLDC GLUCOMTR-MCNC: 162 MG/DL (ref 70–130)
GLUCOSE BLDC GLUCOMTR-MCNC: 99 MG/DL (ref 70–130)
GLUCOSE SERPL-MCNC: 125 MG/DL (ref 65–99)
POTASSIUM SERPL-SCNC: 3.8 MMOL/L (ref 3.5–5.2)
SODIUM SERPL-SCNC: 144 MMOL/L (ref 136–145)

## 2021-10-12 PROCEDURE — 63710000001 INSULIN LISPRO (HUMAN) PER 5 UNITS: Performed by: HOSPITALIST

## 2021-10-12 PROCEDURE — 82962 GLUCOSE BLOOD TEST: CPT

## 2021-10-12 PROCEDURE — 80048 BASIC METABOLIC PNL TOTAL CA: CPT | Performed by: HOSPITALIST

## 2021-10-12 RX ORDER — CEPHALEXIN 250 MG/5ML
250 POWDER, FOR SUSPENSION ORAL EVERY 6 HOURS SCHEDULED
Qty: 60 ML | Refills: 0
Start: 2021-10-12 | End: 2021-10-15

## 2021-10-12 RX ORDER — CLONAZEPAM 0.25 MG/1
0.25 TABLET, ORALLY DISINTEGRATING ORAL NIGHTLY
Qty: 3 TABLET | Refills: 0 | Status: ON HOLD | OUTPATIENT
Start: 2021-10-12 | End: 2022-01-01 | Stop reason: SDUPTHER

## 2021-10-12 RX ORDER — HYDROCODONE BITARTRATE AND ACETAMINOPHEN 5; 325 MG/1; MG/1
1 TABLET ORAL 2 TIMES DAILY
Qty: 6 TABLET | Refills: 0 | Status: SHIPPED | OUTPATIENT
Start: 2021-10-12

## 2021-10-12 RX ADMIN — ROSUVASTATIN CALCIUM 10 MG: 10 TABLET, FILM COATED ORAL at 09:42

## 2021-10-12 RX ADMIN — Medication 250 MG: at 06:38

## 2021-10-12 RX ADMIN — HYDROCODONE BITARTRATE AND ACETAMINOPHEN 1 TABLET: 5; 325 TABLET ORAL at 00:36

## 2021-10-12 RX ADMIN — SODIUM CHLORIDE, PRESERVATIVE FREE 10 ML: 5 INJECTION INTRAVENOUS at 09:43

## 2021-10-12 RX ADMIN — Medication 250 MG: at 12:45

## 2021-10-12 RX ADMIN — INSULIN LISPRO 2 UNITS: 100 INJECTION, SOLUTION INTRAVENOUS; SUBCUTANEOUS at 12:59

## 2021-10-12 RX ADMIN — Medication 250 MG: at 00:36

## 2021-10-12 RX ADMIN — Medication 1 CAPSULE: at 09:42

## 2021-10-12 RX ADMIN — HYDROCODONE BITARTRATE AND ACETAMINOPHEN 1 TABLET: 5; 325 TABLET ORAL at 09:42

## 2021-10-12 RX ADMIN — PANTOPRAZOLE SODIUM 40 MG: 40 INJECTION, POWDER, FOR SOLUTION INTRAVENOUS at 06:38

## 2021-10-18 ENCOUNTER — HOSPITAL ENCOUNTER (OUTPATIENT)
Facility: HOSPITAL | Age: 76
Setting detail: HOSPITAL OUTPATIENT SURGERY
End: 2021-10-18
Attending: UROLOGY | Admitting: UROLOGY

## 2021-10-30 ENCOUNTER — APPOINTMENT (OUTPATIENT)
Dept: GENERAL RADIOLOGY | Facility: HOSPITAL | Age: 76
End: 2021-10-30

## 2021-10-30 ENCOUNTER — HOSPITAL ENCOUNTER (EMERGENCY)
Facility: HOSPITAL | Age: 76
Discharge: HOME | End: 2021-10-30
Attending: EMERGENCY MEDICINE | Admitting: EMERGENCY MEDICINE

## 2021-10-30 VITALS
DIASTOLIC BLOOD PRESSURE: 77 MMHG | TEMPERATURE: 99.1 F | HEART RATE: 93 BPM | OXYGEN SATURATION: 96 % | SYSTOLIC BLOOD PRESSURE: 131 MMHG | RESPIRATION RATE: 22 BRPM

## 2021-10-30 DIAGNOSIS — T17.908A CHRONIC PULMONARY ASPIRATION, INITIAL ENCOUNTER: ICD-10-CM

## 2021-10-30 DIAGNOSIS — T85.598A FEEDING TUBE DYSFUNCTION, INITIAL ENCOUNTER: Primary | ICD-10-CM

## 2021-10-30 PROCEDURE — 99283 EMERGENCY DEPT VISIT LOW MDM: CPT

## 2021-10-30 PROCEDURE — 74018 RADEX ABDOMEN 1 VIEW: CPT

## 2021-10-30 PROCEDURE — 71045 X-RAY EXAM CHEST 1 VIEW: CPT

## 2021-12-31 NOTE — ED TRIAGE NOTES
Pt presents to ED via [facility] from [facility] after facility staff states they could not flush Gtube-pt is on continuous tube feeds and MD on call instructed staff to send patient out at this time for evaluation.     Pt in no acute distress at triage.     RN wore appropriate PPE during entire encounter with patient. Patient compliant with wearing mask at this time. Proper hand hygiene performed before encounter, as deemed necessary during encounter and after completion of encounter with patient.    
yes...

## 2022-01-01 ENCOUNTER — APPOINTMENT (OUTPATIENT)
Dept: GENERAL RADIOLOGY | Facility: HOSPITAL | Age: 77
End: 2022-01-01

## 2022-01-01 ENCOUNTER — APPOINTMENT (OUTPATIENT)
Dept: CT IMAGING | Facility: HOSPITAL | Age: 77
End: 2022-01-01

## 2022-01-01 ENCOUNTER — HOSPITAL ENCOUNTER (INPATIENT)
Facility: HOSPITAL | Age: 77
LOS: 2 days | End: 2022-11-18
Attending: EMERGENCY MEDICINE | Admitting: HOSPITALIST

## 2022-01-01 ENCOUNTER — HOSPITAL ENCOUNTER (EMERGENCY)
Facility: HOSPITAL | Age: 77
Discharge: NURSING, REHAB OR LONG-TERM CARE | End: 2022-09-24
Attending: EMERGENCY MEDICINE | Admitting: EMERGENCY MEDICINE

## 2022-01-01 ENCOUNTER — HOSPITAL ENCOUNTER (INPATIENT)
Facility: HOSPITAL | Age: 77
LOS: 7 days | End: 2022-11-25
Attending: INTERNAL MEDICINE | Admitting: INTERNAL MEDICINE

## 2022-01-01 ENCOUNTER — HOSPITAL ENCOUNTER (OUTPATIENT)
Facility: HOSPITAL | Age: 77
Setting detail: OBSERVATION
Discharge: NURSING, REHAB OR LONG-TERM CARE | End: 2022-02-09
Attending: EMERGENCY MEDICINE | Admitting: INTERNAL MEDICINE

## 2022-01-01 ENCOUNTER — APPOINTMENT (OUTPATIENT)
Dept: INTERVENTIONAL RADIOLOGY/VASCULAR | Facility: HOSPITAL | Age: 77
End: 2022-01-01

## 2022-01-01 VITALS
WEIGHT: 126 LBS | OXYGEN SATURATION: 100 % | DIASTOLIC BLOOD PRESSURE: 74 MMHG | SYSTOLIC BLOOD PRESSURE: 128 MMHG | HEIGHT: 68 IN | BODY MASS INDEX: 19.1 KG/M2 | RESPIRATION RATE: 18 BRPM | HEART RATE: 119 BPM | TEMPERATURE: 99.2 F

## 2022-01-01 VITALS
SYSTOLIC BLOOD PRESSURE: 80 MMHG | WEIGHT: 126 LBS | BODY MASS INDEX: 19.1 KG/M2 | TEMPERATURE: 103.2 F | HEART RATE: 13 BPM | HEIGHT: 68 IN | DIASTOLIC BLOOD PRESSURE: 55 MMHG

## 2022-01-01 VITALS
DIASTOLIC BLOOD PRESSURE: 66 MMHG | WEIGHT: 126 LBS | TEMPERATURE: 98.3 F | HEART RATE: 69 BPM | OXYGEN SATURATION: 97 % | SYSTOLIC BLOOD PRESSURE: 119 MMHG | BODY MASS INDEX: 19.1 KG/M2 | RESPIRATION RATE: 16 BRPM | HEIGHT: 68 IN

## 2022-01-01 VITALS
DIASTOLIC BLOOD PRESSURE: 78 MMHG | SYSTOLIC BLOOD PRESSURE: 132 MMHG | HEART RATE: 80 BPM | HEIGHT: 68 IN | RESPIRATION RATE: 16 BRPM | BODY MASS INDEX: 19.1 KG/M2 | TEMPERATURE: 97.2 F | WEIGHT: 126 LBS | OXYGEN SATURATION: 98 %

## 2022-01-01 DIAGNOSIS — J96.01 ACUTE RESPIRATORY FAILURE WITH HYPOXIA AND HYPERCAPNIA: Primary | ICD-10-CM

## 2022-01-01 DIAGNOSIS — A41.9 SEPSIS, DUE TO UNSPECIFIED ORGANISM, UNSPECIFIED WHETHER ACUTE ORGAN DYSFUNCTION PRESENT: ICD-10-CM

## 2022-01-01 DIAGNOSIS — T85.528A DISLODGED GASTROSTOMY TUBE: Primary | ICD-10-CM

## 2022-01-01 DIAGNOSIS — J44.9 CHRONIC OBSTRUCTIVE PULMONARY DISEASE, UNSPECIFIED COPD TYPE: ICD-10-CM

## 2022-01-01 DIAGNOSIS — J96.02 ACUTE RESPIRATORY FAILURE WITH HYPOXIA AND HYPERCAPNIA: Primary | ICD-10-CM

## 2022-01-01 DIAGNOSIS — N32.9 LESION OF BLADDER: ICD-10-CM

## 2022-01-01 DIAGNOSIS — M62.3 IMMOBILITY SYNDROME: ICD-10-CM

## 2022-01-01 DIAGNOSIS — Z93.1 GASTROSTOMY TUBE DEPENDENT: ICD-10-CM

## 2022-01-01 DIAGNOSIS — G47.09 OTHER INSOMNIA: ICD-10-CM

## 2022-01-01 DIAGNOSIS — R73.9 HYPERGLYCEMIA: ICD-10-CM

## 2022-01-01 DIAGNOSIS — L03.311 ABDOMINAL WALL CELLULITIS: ICD-10-CM

## 2022-01-01 LAB
ALBUMIN SERPL-MCNC: 3.9 G/DL (ref 3.5–5.2)
ALBUMIN SERPL-MCNC: 3.9 G/DL (ref 3.5–5.2)
ALBUMIN SERPL-MCNC: 4.3 G/DL (ref 3.5–5.2)
ALBUMIN/GLOB SERPL: 1.1 G/DL
ALBUMIN/GLOB SERPL: 1.2 G/DL
ALBUMIN/GLOB SERPL: 1.3 G/DL
ALP SERPL-CCNC: 60 U/L (ref 39–117)
ALP SERPL-CCNC: 61 U/L (ref 39–117)
ALP SERPL-CCNC: 84 U/L (ref 39–117)
ALT SERPL W P-5'-P-CCNC: 11 U/L (ref 1–41)
ALT SERPL W P-5'-P-CCNC: 15 U/L (ref 1–41)
ALT SERPL W P-5'-P-CCNC: 24 U/L (ref 1–41)
ANION GAP SERPL CALCULATED.3IONS-SCNC: 11 MMOL/L (ref 5–15)
ANION GAP SERPL CALCULATED.3IONS-SCNC: 11.1 MMOL/L (ref 5–15)
ANION GAP SERPL CALCULATED.3IONS-SCNC: 11.8 MMOL/L (ref 5–15)
ANION GAP SERPL CALCULATED.3IONS-SCNC: 12.8 MMOL/L (ref 5–15)
ANION GAP SERPL CALCULATED.3IONS-SCNC: 13 MMOL/L (ref 5–15)
ARTERIAL PATENCY WRIST A: POSITIVE
AST SERPL-CCNC: 21 U/L (ref 1–40)
AST SERPL-CCNC: 25 U/L (ref 1–40)
AST SERPL-CCNC: 28 U/L (ref 1–40)
ATMOSPHERIC PRESS: 757.7 MMHG
B PARAPERT DNA SPEC QL NAA+PROBE: NOT DETECTED
B PERT DNA SPEC QL NAA+PROBE: NOT DETECTED
BACTERIA SPEC AEROBE CULT: NORMAL
BACTERIA SPEC AEROBE CULT: NORMAL
BASE EXCESS BLDA CALC-SCNC: -1.3 MMOL/L (ref 0–2)
BASOPHILS # BLD AUTO: 0.03 10*3/MM3 (ref 0–0.2)
BASOPHILS NFR BLD AUTO: 0.2 % (ref 0–1.5)
BASOPHILS NFR BLD AUTO: 0.3 % (ref 0–1.5)
BASOPHILS NFR BLD AUTO: 0.3 % (ref 0–1.5)
BASOPHILS NFR BLD AUTO: 0.4 % (ref 0–1.5)
BDY SITE: ABNORMAL
BILIRUB SERPL-MCNC: 0.2 MG/DL (ref 0–1.2)
BILIRUB SERPL-MCNC: 0.2 MG/DL (ref 0–1.2)
BILIRUB SERPL-MCNC: 0.3 MG/DL (ref 0–1.2)
BUN SERPL-MCNC: 15 MG/DL (ref 8–23)
BUN SERPL-MCNC: 20 MG/DL (ref 8–23)
BUN SERPL-MCNC: 23 MG/DL (ref 8–23)
BUN SERPL-MCNC: 26 MG/DL (ref 8–23)
BUN SERPL-MCNC: 29 MG/DL (ref 8–23)
BUN/CREAT SERPL: 18.1 (ref 7–25)
BUN/CREAT SERPL: 23.8 (ref 7–25)
BUN/CREAT SERPL: 25.5 (ref 7–25)
BUN/CREAT SERPL: 28 (ref 7–25)
BUN/CREAT SERPL: 33 (ref 7–25)
C PNEUM DNA NPH QL NAA+NON-PROBE: NOT DETECTED
CALCIUM SPEC-SCNC: 10 MG/DL (ref 8.6–10.5)
CALCIUM SPEC-SCNC: 8.7 MG/DL (ref 8.6–10.5)
CALCIUM SPEC-SCNC: 9.3 MG/DL (ref 8.6–10.5)
CALCIUM SPEC-SCNC: 9.7 MG/DL (ref 8.6–10.5)
CALCIUM SPEC-SCNC: 9.8 MG/DL (ref 8.6–10.5)
CHLORIDE SERPL-SCNC: 103 MMOL/L (ref 98–107)
CHLORIDE SERPL-SCNC: 103 MMOL/L (ref 98–107)
CHLORIDE SERPL-SCNC: 104 MMOL/L (ref 98–107)
CHLORIDE SERPL-SCNC: 106 MMOL/L (ref 98–107)
CHLORIDE SERPL-SCNC: 108 MMOL/L (ref 98–107)
CO2 SERPL-SCNC: 21.2 MMOL/L (ref 22–29)
CO2 SERPL-SCNC: 23.9 MMOL/L (ref 22–29)
CO2 SERPL-SCNC: 24.2 MMOL/L (ref 22–29)
CO2 SERPL-SCNC: 26 MMOL/L (ref 22–29)
CO2 SERPL-SCNC: 27 MMOL/L (ref 22–29)
CREAT SERPL-MCNC: 0.82 MG/DL (ref 0.76–1.27)
CREAT SERPL-MCNC: 0.83 MG/DL (ref 0.76–1.27)
CREAT SERPL-MCNC: 0.84 MG/DL (ref 0.76–1.27)
CREAT SERPL-MCNC: 0.88 MG/DL (ref 0.76–1.27)
CREAT SERPL-MCNC: 1.02 MG/DL (ref 0.76–1.27)
D-LACTATE SERPL-SCNC: 2.3 MMOL/L (ref 0.5–2)
D-LACTATE SERPL-SCNC: 3.4 MMOL/L (ref 0.5–2)
D-LACTATE SERPL-SCNC: 4.6 MMOL/L (ref 0.5–2)
D-LACTATE SERPL-SCNC: 5.1 MMOL/L (ref 0.5–2)
DEPRECATED RDW RBC AUTO: 34.9 FL (ref 37–54)
DEPRECATED RDW RBC AUTO: 36.5 FL (ref 37–54)
DEPRECATED RDW RBC AUTO: 37.3 FL (ref 37–54)
DEPRECATED RDW RBC AUTO: 38.9 FL (ref 37–54)
DEPRECATED RDW RBC AUTO: 39.4 FL (ref 37–54)
EGFRCR SERPLBLD CKD-EPI 2021: 75.2 ML/MIN/1.73
EGFRCR SERPLBLD CKD-EPI 2021: 88 ML/MIN/1.73
EGFRCR SERPLBLD CKD-EPI 2021: 89.9 ML/MIN/1.73
EOSINOPHIL # BLD AUTO: 0.02 10*3/MM3 (ref 0–0.4)
EOSINOPHIL # BLD AUTO: 0.04 10*3/MM3 (ref 0–0.4)
EOSINOPHIL # BLD AUTO: 0.06 10*3/MM3 (ref 0–0.4)
EOSINOPHIL # BLD AUTO: 0.09 10*3/MM3 (ref 0–0.4)
EOSINOPHIL NFR BLD AUTO: 0.2 % (ref 0.3–6.2)
EOSINOPHIL NFR BLD AUTO: 0.4 % (ref 0.3–6.2)
EOSINOPHIL NFR BLD AUTO: 0.4 % (ref 0.3–6.2)
EOSINOPHIL NFR BLD AUTO: 1.2 % (ref 0.3–6.2)
ERYTHROCYTE [DISTWIDTH] IN BLOOD BY AUTOMATED COUNT: 12 % (ref 12.3–15.4)
ERYTHROCYTE [DISTWIDTH] IN BLOOD BY AUTOMATED COUNT: 12.3 % (ref 12.3–15.4)
ERYTHROCYTE [DISTWIDTH] IN BLOOD BY AUTOMATED COUNT: 12.7 % (ref 12.3–15.4)
ERYTHROCYTE [DISTWIDTH] IN BLOOD BY AUTOMATED COUNT: 13.1 % (ref 12.3–15.4)
ERYTHROCYTE [DISTWIDTH] IN BLOOD BY AUTOMATED COUNT: 13.1 % (ref 12.3–15.4)
FLUAV SUBTYP SPEC NAA+PROBE: NOT DETECTED
FLUBV RNA ISLT QL NAA+PROBE: NOT DETECTED
GFR SERPL CREATININE-BSD FRML MDRD: 107 ML/MIN/1.73
GFR SERPL CREATININE-BSD FRML MDRD: 109 ML/MIN/1.73
GLOBULIN UR ELPH-MCNC: 3.2 GM/DL
GLOBULIN UR ELPH-MCNC: 3.2 GM/DL
GLOBULIN UR ELPH-MCNC: 3.7 GM/DL
GLUCOSE BLDC GLUCOMTR-MCNC: 108 MG/DL (ref 70–130)
GLUCOSE BLDC GLUCOMTR-MCNC: 113 MG/DL (ref 70–130)
GLUCOSE BLDC GLUCOMTR-MCNC: 130 MG/DL (ref 70–130)
GLUCOSE BLDC GLUCOMTR-MCNC: 164 MG/DL (ref 70–130)
GLUCOSE BLDC GLUCOMTR-MCNC: 173 MG/DL (ref 70–130)
GLUCOSE BLDC GLUCOMTR-MCNC: 175 MG/DL (ref 70–130)
GLUCOSE BLDC GLUCOMTR-MCNC: 84 MG/DL (ref 70–130)
GLUCOSE SERPL-MCNC: 119 MG/DL (ref 65–99)
GLUCOSE SERPL-MCNC: 124 MG/DL (ref 65–99)
GLUCOSE SERPL-MCNC: 157 MG/DL (ref 65–99)
GLUCOSE SERPL-MCNC: 215 MG/DL (ref 65–99)
GLUCOSE SERPL-MCNC: 91 MG/DL (ref 65–99)
HADV DNA SPEC NAA+PROBE: NOT DETECTED
HBA1C MFR BLD: 7.6 % (ref 4.8–5.6)
HCO3 BLDA-SCNC: 26.8 MMOL/L (ref 22–28)
HCOV 229E RNA SPEC QL NAA+PROBE: NOT DETECTED
HCOV HKU1 RNA SPEC QL NAA+PROBE: NOT DETECTED
HCOV NL63 RNA SPEC QL NAA+PROBE: NOT DETECTED
HCOV OC43 RNA SPEC QL NAA+PROBE: NOT DETECTED
HCT VFR BLD AUTO: 40.7 % (ref 37.5–51)
HCT VFR BLD AUTO: 40.9 % (ref 37.5–51)
HCT VFR BLD AUTO: 41 % (ref 37.5–51)
HCT VFR BLD AUTO: 41.9 % (ref 37.5–51)
HCT VFR BLD AUTO: 42.7 % (ref 37.5–51)
HGB BLD-MCNC: 12.8 G/DL (ref 13–17.7)
HGB BLD-MCNC: 13.7 G/DL (ref 13–17.7)
HGB BLD-MCNC: 13.7 G/DL (ref 13–17.7)
HGB BLD-MCNC: 13.9 G/DL (ref 13–17.7)
HGB BLD-MCNC: 14 G/DL (ref 13–17.7)
HMPV RNA NPH QL NAA+NON-PROBE: NOT DETECTED
HPIV1 RNA ISLT QL NAA+PROBE: NOT DETECTED
HPIV2 RNA SPEC QL NAA+PROBE: NOT DETECTED
HPIV3 RNA NPH QL NAA+PROBE: NOT DETECTED
HPIV4 P GENE NPH QL NAA+PROBE: NOT DETECTED
IMM GRANULOCYTES # BLD AUTO: 0.02 10*3/MM3 (ref 0–0.05)
IMM GRANULOCYTES # BLD AUTO: 0.03 10*3/MM3 (ref 0–0.05)
IMM GRANULOCYTES # BLD AUTO: 0.04 10*3/MM3 (ref 0–0.05)
IMM GRANULOCYTES # BLD AUTO: 0.13 10*3/MM3 (ref 0–0.05)
IMM GRANULOCYTES NFR BLD AUTO: 0.3 % (ref 0–0.5)
IMM GRANULOCYTES NFR BLD AUTO: 0.3 % (ref 0–0.5)
IMM GRANULOCYTES NFR BLD AUTO: 0.4 % (ref 0–0.5)
IMM GRANULOCYTES NFR BLD AUTO: 0.9 % (ref 0–0.5)
INHALED O2 CONCENTRATION: 100 %
LYMPHOCYTES # BLD AUTO: 1.48 10*3/MM3 (ref 0.7–3.1)
LYMPHOCYTES # BLD AUTO: 1.6 10*3/MM3 (ref 0.7–3.1)
LYMPHOCYTES # BLD AUTO: 1.9 10*3/MM3 (ref 0.7–3.1)
LYMPHOCYTES # BLD AUTO: 2 10*3/MM3 (ref 0.7–3.1)
LYMPHOCYTES NFR BLD AUTO: 10.8 % (ref 19.6–45.3)
LYMPHOCYTES NFR BLD AUTO: 15.7 % (ref 19.6–45.3)
LYMPHOCYTES NFR BLD AUTO: 21 % (ref 19.6–45.3)
LYMPHOCYTES NFR BLD AUTO: 26 % (ref 19.6–45.3)
M PNEUMO IGG SER IA-ACNC: NOT DETECTED
MCH RBC QN AUTO: 26.5 PG (ref 26.6–33)
MCH RBC QN AUTO: 26.7 PG (ref 26.6–33)
MCH RBC QN AUTO: 26.7 PG (ref 26.6–33)
MCH RBC QN AUTO: 27 PG (ref 26.6–33)
MCH RBC QN AUTO: 27.1 PG (ref 26.6–33)
MCHC RBC AUTO-ENTMCNC: 31.4 G/DL (ref 31.5–35.7)
MCHC RBC AUTO-ENTMCNC: 32.6 G/DL (ref 31.5–35.7)
MCHC RBC AUTO-ENTMCNC: 33.4 G/DL (ref 31.5–35.7)
MCHC RBC AUTO-ENTMCNC: 33.4 G/DL (ref 31.5–35.7)
MCHC RBC AUTO-ENTMCNC: 33.5 G/DL (ref 31.5–35.7)
MCV RBC AUTO: 79.2 FL (ref 79–97)
MCV RBC AUTO: 80.7 FL (ref 79–97)
MCV RBC AUTO: 81 FL (ref 79–97)
MCV RBC AUTO: 82.1 FL (ref 79–97)
MCV RBC AUTO: 85 FL (ref 79–97)
MODALITY: ABNORMAL
MONOCYTES # BLD AUTO: 0.82 10*3/MM3 (ref 0.1–0.9)
MONOCYTES # BLD AUTO: 0.93 10*3/MM3 (ref 0.1–0.9)
MONOCYTES # BLD AUTO: 1.3 10*3/MM3 (ref 0.1–0.9)
MONOCYTES # BLD AUTO: 1.3 10*3/MM3 (ref 0.1–0.9)
MONOCYTES NFR BLD AUTO: 12.7 % (ref 5–12)
MONOCYTES NFR BLD AUTO: 13.7 % (ref 5–12)
MONOCYTES NFR BLD AUTO: 8.7 % (ref 5–12)
MONOCYTES NFR BLD AUTO: 8.8 % (ref 5–12)
NEUTROPHILS NFR BLD AUTO: 11.64 10*3/MM3 (ref 1.7–7)
NEUTROPHILS NFR BLD AUTO: 4.33 10*3/MM3 (ref 1.7–7)
NEUTROPHILS NFR BLD AUTO: 59.4 % (ref 42.7–76)
NEUTROPHILS NFR BLD AUTO: 6.1 10*3/MM3 (ref 1.7–7)
NEUTROPHILS NFR BLD AUTO: 64.2 % (ref 42.7–76)
NEUTROPHILS NFR BLD AUTO: 7.03 10*3/MM3 (ref 1.7–7)
NEUTROPHILS NFR BLD AUTO: 74.8 % (ref 42.7–76)
NEUTROPHILS NFR BLD AUTO: 78.9 % (ref 42.7–76)
NRBC BLD AUTO-RTO: 0 /100 WBC (ref 0–0.2)
NT-PROBNP SERPL-MCNC: 644 PG/ML (ref 0–1800)
O2 A-A PPRESDIFF RESPIRATORY: 0.1 MMHG
PCO2 BLDA: 58.3 MM HG (ref 35–45)
PH BLDA: 7.27 PH UNITS (ref 7.35–7.45)
PLATELET # BLD AUTO: 213 10*3/MM3 (ref 140–450)
PLATELET # BLD AUTO: 221 10*3/MM3 (ref 140–450)
PLATELET # BLD AUTO: 241 10*3/MM3 (ref 140–450)
PLATELET # BLD AUTO: 251 10*3/MM3 (ref 140–450)
PLATELET # BLD AUTO: 274 10*3/MM3 (ref 140–450)
PMV BLD AUTO: 11.8 FL (ref 6–12)
PMV BLD AUTO: 11.9 FL (ref 6–12)
PMV BLD AUTO: 12.3 FL (ref 6–12)
PMV BLD AUTO: 12.6 FL (ref 6–12)
PMV BLD AUTO: 12.9 FL (ref 6–12)
PO2 BLDA: 66.7 MM HG (ref 80–100)
POTASSIUM SERPL-SCNC: 4.3 MMOL/L (ref 3.5–5.2)
POTASSIUM SERPL-SCNC: 4.5 MMOL/L (ref 3.5–5.2)
POTASSIUM SERPL-SCNC: 4.9 MMOL/L (ref 3.5–5.2)
POTASSIUM SERPL-SCNC: 5 MMOL/L (ref 3.5–5.2)
POTASSIUM SERPL-SCNC: 5.2 MMOL/L (ref 3.5–5.2)
PROCALCITONIN SERPL-MCNC: 0.11 NG/ML (ref 0–0.25)
PROT SERPL-MCNC: 7.1 G/DL (ref 6–8.5)
PROT SERPL-MCNC: 7.5 G/DL (ref 6–8.5)
PROT SERPL-MCNC: 7.6 G/DL (ref 6–8.5)
QT INTERVAL: 313 MS
QT INTERVAL: 352 MS
RBC # BLD AUTO: 4.79 10*6/MM3 (ref 4.14–5.8)
RBC # BLD AUTO: 5.05 10*6/MM3 (ref 4.14–5.8)
RBC # BLD AUTO: 5.08 10*6/MM3 (ref 4.14–5.8)
RBC # BLD AUTO: 5.2 10*6/MM3 (ref 4.14–5.8)
RBC # BLD AUTO: 5.29 10*6/MM3 (ref 4.14–5.8)
RHINOVIRUS RNA SPEC NAA+PROBE: NOT DETECTED
RSV RNA NPH QL NAA+NON-PROBE: NOT DETECTED
SAO2 % BLDCOA: 89.5 % (ref 92–99)
SARS-COV-2 RNA NPH QL NAA+NON-PROBE: NOT DETECTED
SARS-COV-2 RNA PNL SPEC NAA+PROBE: NOT DETECTED
SODIUM SERPL-SCNC: 138 MMOL/L (ref 136–145)
SODIUM SERPL-SCNC: 141 MMOL/L (ref 136–145)
SODIUM SERPL-SCNC: 141 MMOL/L (ref 136–145)
SODIUM SERPL-SCNC: 143 MMOL/L (ref 136–145)
SODIUM SERPL-SCNC: 143 MMOL/L (ref 136–145)
TOTAL RATE: 36 BREATHS/MINUTE
TROPONIN T SERPL-MCNC: <0.01 NG/ML (ref 0–0.03)
WBC NRBC COR # BLD: 14.76 10*3/MM3 (ref 3.4–10.8)
WBC NRBC COR # BLD: 22.97 10*3/MM3 (ref 3.4–10.8)
WBC NRBC COR # BLD: 7.3 10*3/MM3 (ref 3.4–10.8)
WBC NRBC COR # BLD: 9.41 10*3/MM3 (ref 3.4–10.8)
WBC NRBC COR # BLD: 9.51 10*3/MM3 (ref 3.4–10.8)

## 2022-01-01 PROCEDURE — 99222 1ST HOSP IP/OBS MODERATE 55: CPT | Performed by: INTERNAL MEDICINE

## 2022-01-01 PROCEDURE — 25010000002 HYDROMORPHONE 1 MG/ML SOLUTION: Performed by: HOSPITALIST

## 2022-01-01 PROCEDURE — 74177 CT ABD & PELVIS W/CONTRAST: CPT

## 2022-01-01 PROCEDURE — 82962 GLUCOSE BLOOD TEST: CPT

## 2022-01-01 PROCEDURE — 93005 ELECTROCARDIOGRAM TRACING: CPT | Performed by: HOSPITALIST

## 2022-01-01 PROCEDURE — 63710000001 INSULIN LISPRO (HUMAN) PER 5 UNITS: Performed by: NURSE PRACTITIONER

## 2022-01-01 PROCEDURE — 99231 SBSQ HOSP IP/OBS SF/LOW 25: CPT | Performed by: INTERNAL MEDICINE

## 2022-01-01 PROCEDURE — 87635 SARS-COV-2 COVID-19 AMP PRB: CPT | Performed by: PHYSICIAN ASSISTANT

## 2022-01-01 PROCEDURE — 80053 COMPREHEN METABOLIC PANEL: CPT | Performed by: EMERGENCY MEDICINE

## 2022-01-01 PROCEDURE — 25010000002 LORAZEPAM PER 2 MG: Performed by: HOSPITALIST

## 2022-01-01 PROCEDURE — 80048 BASIC METABOLIC PNL TOTAL CA: CPT | Performed by: INTERNAL MEDICINE

## 2022-01-01 PROCEDURE — 25010000002 LORAZEPAM PER 2 MG: Performed by: INTERNAL MEDICINE

## 2022-01-01 PROCEDURE — 80048 BASIC METABOLIC PNL TOTAL CA: CPT | Performed by: NURSE PRACTITIONER

## 2022-01-01 PROCEDURE — 25010000002 HYDROMORPHONE PER 4 MG: Performed by: INTERNAL MEDICINE

## 2022-01-01 PROCEDURE — 96375 TX/PRO/DX INJ NEW DRUG ADDON: CPT

## 2022-01-01 PROCEDURE — 25010000002 HYDROMORPHONE 1 MG/ML SOLUTION: Performed by: INTERNAL MEDICINE

## 2022-01-01 PROCEDURE — 83605 ASSAY OF LACTIC ACID: CPT | Performed by: EMERGENCY MEDICINE

## 2022-01-01 PROCEDURE — 85025 COMPLETE CBC W/AUTO DIFF WBC: CPT | Performed by: EMERGENCY MEDICINE

## 2022-01-01 PROCEDURE — 94660 CPAP INITIATION&MGMT: CPT

## 2022-01-01 PROCEDURE — 36600 WITHDRAWAL OF ARTERIAL BLOOD: CPT

## 2022-01-01 PROCEDURE — 36415 COLL VENOUS BLD VENIPUNCTURE: CPT | Performed by: PHYSICIAN ASSISTANT

## 2022-01-01 PROCEDURE — 25010000002 MORPHINE PER 10 MG: Performed by: HOSPITALIST

## 2022-01-01 PROCEDURE — 94799 UNLISTED PULMONARY SVC/PX: CPT

## 2022-01-01 PROCEDURE — 87102 FUNGUS ISOLATION CULTURE: CPT | Performed by: INTERNAL MEDICINE

## 2022-01-01 PROCEDURE — 25010000002 HALOPERIDOL LACTATE PER 5 MG: Performed by: NURSE PRACTITIONER

## 2022-01-01 PROCEDURE — 0 DIATRIZOATE MEGLUMINE & SODIUM PER 1 ML: Performed by: EMERGENCY MEDICINE

## 2022-01-01 PROCEDURE — 25010000002 LORAZEPAM PER 2 MG: Performed by: NURSE PRACTITIONER

## 2022-01-01 PROCEDURE — 85025 COMPLETE CBC W/AUTO DIFF WBC: CPT | Performed by: PHYSICIAN ASSISTANT

## 2022-01-01 PROCEDURE — 80053 COMPREHEN METABOLIC PANEL: CPT | Performed by: PHYSICIAN ASSISTANT

## 2022-01-01 PROCEDURE — 0202U NFCT DS 22 TRGT SARS-COV-2: CPT | Performed by: EMERGENCY MEDICINE

## 2022-01-01 PROCEDURE — 99285 EMERGENCY DEPT VISIT HI MDM: CPT

## 2022-01-01 PROCEDURE — 25010000002 ONDANSETRON PER 1 MG: Performed by: INTERNAL MEDICINE

## 2022-01-01 PROCEDURE — 93010 ELECTROCARDIOGRAM REPORT: CPT | Performed by: INTERNAL MEDICINE

## 2022-01-01 PROCEDURE — 93005 ELECTROCARDIOGRAM TRACING: CPT | Performed by: EMERGENCY MEDICINE

## 2022-01-01 PROCEDURE — G0378 HOSPITAL OBSERVATION PER HR: HCPCS

## 2022-01-01 PROCEDURE — 84145 PROCALCITONIN (PCT): CPT | Performed by: EMERGENCY MEDICINE

## 2022-01-01 PROCEDURE — 25010000002 IOPAMIDOL 61 % SOLUTION: Performed by: HOSPITALIST

## 2022-01-01 PROCEDURE — 99284 EMERGENCY DEPT VISIT MOD MDM: CPT

## 2022-01-01 PROCEDURE — 84484 ASSAY OF TROPONIN QUANT: CPT | Performed by: EMERGENCY MEDICINE

## 2022-01-01 PROCEDURE — 83880 ASSAY OF NATRIURETIC PEPTIDE: CPT | Performed by: EMERGENCY MEDICINE

## 2022-01-01 PROCEDURE — 25010000002 PIPERACILLIN SOD-TAZOBACTAM PER 1 G: Performed by: EMERGENCY MEDICINE

## 2022-01-01 PROCEDURE — 83036 HEMOGLOBIN GLYCOSYLATED A1C: CPT | Performed by: NURSE PRACTITIONER

## 2022-01-01 PROCEDURE — 96361 HYDRATE IV INFUSION ADD-ON: CPT

## 2022-01-01 PROCEDURE — 71045 X-RAY EXAM CHEST 1 VIEW: CPT

## 2022-01-01 PROCEDURE — 94761 N-INVAS EAR/PLS OXIMETRY MLT: CPT

## 2022-01-01 PROCEDURE — 94664 DEMO&/EVAL PT USE INHALER: CPT

## 2022-01-01 PROCEDURE — 25010000002 METHYLPREDNISOLONE PER 125 MG: Performed by: EMERGENCY MEDICINE

## 2022-01-01 PROCEDURE — 74176 CT ABD & PELVIS W/O CONTRAST: CPT

## 2022-01-01 PROCEDURE — 49450 REPLACE G/C TUBE PERC: CPT

## 2022-01-01 PROCEDURE — 25010000002 VANCOMYCIN 10 G RECONSTITUTED SOLUTION: Performed by: EMERGENCY MEDICINE

## 2022-01-01 PROCEDURE — 0 DIATRIZOATE MEGLUMINE & SODIUM PER 1 ML: Performed by: INTERNAL MEDICINE

## 2022-01-01 PROCEDURE — 99221 1ST HOSP IP/OBS SF/LOW 40: CPT | Performed by: INTERNAL MEDICINE

## 2022-01-01 PROCEDURE — 82803 BLOOD GASES ANY COMBINATION: CPT

## 2022-01-01 PROCEDURE — 85025 COMPLETE CBC W/AUTO DIFF WBC: CPT | Performed by: INTERNAL MEDICINE

## 2022-01-01 PROCEDURE — 36415 COLL VENOUS BLD VENIPUNCTURE: CPT | Performed by: NURSE PRACTITIONER

## 2022-01-01 PROCEDURE — 85027 COMPLETE CBC AUTOMATED: CPT | Performed by: NURSE PRACTITIONER

## 2022-01-01 PROCEDURE — 87040 BLOOD CULTURE FOR BACTERIA: CPT | Performed by: EMERGENCY MEDICINE

## 2022-01-01 PROCEDURE — 99221 1ST HOSP IP/OBS SF/LOW 40: CPT | Performed by: NURSE PRACTITIONER

## 2022-01-01 PROCEDURE — C1769 GUIDE WIRE: HCPCS

## 2022-01-01 PROCEDURE — 96374 THER/PROPH/DIAG INJ IV PUSH: CPT

## 2022-01-01 PROCEDURE — 93010 ELECTROCARDIOGRAM REPORT: CPT | Performed by: STUDENT IN AN ORGANIZED HEALTH CARE EDUCATION/TRAINING PROGRAM

## 2022-01-01 RX ORDER — DEXTROSE MONOHYDRATE 25 G/50ML
25 INJECTION, SOLUTION INTRAVENOUS
Status: DISCONTINUED | OUTPATIENT
Start: 2022-01-01 | End: 2022-01-01

## 2022-01-01 RX ORDER — LORAZEPAM 2 MG/ML
0.5 CONCENTRATE ORAL
Status: DISCONTINUED | OUTPATIENT
Start: 2022-01-01 | End: 2022-01-01

## 2022-01-01 RX ORDER — DIVALPROEX SODIUM 125 MG/1
250 CAPSULE, COATED PELLETS ORAL NIGHTLY
COMMUNITY

## 2022-01-01 RX ORDER — GLYCOPYRROLATE 0.2 MG/ML
0.4 INJECTION INTRAMUSCULAR; INTRAVENOUS
Status: DISCONTINUED | OUTPATIENT
Start: 2022-01-01 | End: 2022-01-01

## 2022-01-01 RX ORDER — HALOPERIDOL 5 MG/ML
1 INJECTION INTRAMUSCULAR ONCE
Status: COMPLETED | OUTPATIENT
Start: 2022-01-01 | End: 2022-01-01

## 2022-01-01 RX ORDER — LORAZEPAM 2 MG/ML
1 CONCENTRATE ORAL
Status: DISCONTINUED | OUTPATIENT
Start: 2022-01-01 | End: 2022-01-01 | Stop reason: HOSPADM

## 2022-01-01 RX ORDER — SODIUM CHLORIDE 0.9 % (FLUSH) 0.9 %
10 SYRINGE (ML) INJECTION AS NEEDED
Status: DISCONTINUED | OUTPATIENT
Start: 2022-01-01 | End: 2022-01-01

## 2022-01-01 RX ORDER — MORPHINE SULFATE 20 MG/ML
5 SOLUTION ORAL
Status: DISCONTINUED | OUTPATIENT
Start: 2022-01-01 | End: 2022-01-01

## 2022-01-01 RX ORDER — MORPHINE SULFATE 10 MG/ML
2 INJECTION INTRAMUSCULAR; INTRAVENOUS; SUBCUTANEOUS ONCE
Status: DISCONTINUED | OUTPATIENT
Start: 2022-01-01 | End: 2022-01-01

## 2022-01-01 RX ORDER — DEXTROSE MONOHYDRATE 25 G/50ML
25 INJECTION, SOLUTION INTRAVENOUS
Status: DISCONTINUED | OUTPATIENT
Start: 2022-01-01 | End: 2022-01-01 | Stop reason: HOSPADM

## 2022-01-01 RX ORDER — ONDANSETRON 2 MG/ML
4 INJECTION INTRAMUSCULAR; INTRAVENOUS EVERY 6 HOURS PRN
Status: CANCELLED | OUTPATIENT
Start: 2022-01-01

## 2022-01-01 RX ORDER — MORPHINE SULFATE 20 MG/ML
20 SOLUTION ORAL
Status: DISCONTINUED | OUTPATIENT
Start: 2022-01-01 | End: 2022-01-01

## 2022-01-01 RX ORDER — LORAZEPAM 2 MG/ML
1 INJECTION INTRAMUSCULAR
Status: CANCELLED | OUTPATIENT
Start: 2022-01-01 | End: 2022-01-01

## 2022-01-01 RX ORDER — DIVALPROEX SODIUM 125 MG/1
125 CAPSULE, COATED PELLETS ORAL EVERY MORNING
COMMUNITY

## 2022-01-01 RX ORDER — DIVALPROEX SODIUM 125 MG/1
125 CAPSULE, COATED PELLETS ORAL EVERY MORNING
Status: DISCONTINUED | OUTPATIENT
Start: 2022-01-01 | End: 2022-01-01 | Stop reason: HOSPADM

## 2022-01-01 RX ORDER — CLONAZEPAM 0.25 MG/1
0.25 TABLET, ORALLY DISINTEGRATING ORAL NIGHTLY PRN
Qty: 3 TABLET | Refills: 0 | Status: SHIPPED | OUTPATIENT
Start: 2022-01-01

## 2022-01-01 RX ORDER — ACETAMINOPHEN 650 MG/1
650 SUPPOSITORY RECTAL EVERY 4 HOURS PRN
Status: DISCONTINUED | OUTPATIENT
Start: 2022-01-01 | End: 2022-01-01 | Stop reason: HOSPADM

## 2022-01-01 RX ORDER — LORAZEPAM 2 MG/ML
1 INJECTION INTRAMUSCULAR
Status: DISCONTINUED | OUTPATIENT
Start: 2022-01-01 | End: 2022-01-01 | Stop reason: HOSPADM

## 2022-01-01 RX ORDER — IPRATROPIUM BROMIDE AND ALBUTEROL SULFATE 2.5; .5 MG/3ML; MG/3ML
3 SOLUTION RESPIRATORY (INHALATION) EVERY 4 HOURS PRN
Status: DISCONTINUED | OUTPATIENT
Start: 2022-01-01 | End: 2022-01-01 | Stop reason: HOSPADM

## 2022-01-01 RX ORDER — NICOTINE POLACRILEX 4 MG
15 LOZENGE BUCCAL
Status: DISCONTINUED | OUTPATIENT
Start: 2022-01-01 | End: 2022-01-01

## 2022-01-01 RX ORDER — SODIUM CHLORIDE 0.9 % (FLUSH) 0.9 %
10 SYRINGE (ML) INJECTION EVERY 12 HOURS SCHEDULED
Status: DISCONTINUED | OUTPATIENT
Start: 2022-01-01 | End: 2022-01-01

## 2022-01-01 RX ORDER — HYDROMORPHONE HYDROCHLORIDE 1 MG/ML
0.5 INJECTION, SOLUTION INTRAMUSCULAR; INTRAVENOUS; SUBCUTANEOUS
Status: CANCELLED | OUTPATIENT
Start: 2022-01-01 | End: 2022-01-01

## 2022-01-01 RX ORDER — DIVALPROEX SODIUM 125 MG/1
250 CAPSULE, COATED PELLETS ORAL NIGHTLY
Status: DISCONTINUED | OUTPATIENT
Start: 2022-01-01 | End: 2022-01-01 | Stop reason: HOSPADM

## 2022-01-01 RX ORDER — ACETAMINOPHEN 325 MG/1
650 TABLET ORAL EVERY 4 HOURS PRN
Status: DISCONTINUED | OUTPATIENT
Start: 2022-01-01 | End: 2022-01-01

## 2022-01-01 RX ORDER — ACETAMINOPHEN 650 MG/1
650 SUPPOSITORY RECTAL EVERY 4 HOURS PRN
Status: CANCELLED | OUTPATIENT
Start: 2022-01-01

## 2022-01-01 RX ORDER — HALOPERIDOL 2 MG/ML
1 SOLUTION ORAL EVERY 4 HOURS PRN
Status: DISCONTINUED | OUTPATIENT
Start: 2022-01-01 | End: 2022-01-01

## 2022-01-01 RX ORDER — LORAZEPAM 2 MG/ML
2 CONCENTRATE ORAL
Status: DISCONTINUED | OUTPATIENT
Start: 2022-01-01 | End: 2022-01-01

## 2022-01-01 RX ORDER — MORPHINE SULFATE 10 MG/ML
2 INJECTION INTRAMUSCULAR; INTRAVENOUS; SUBCUTANEOUS
Status: DISCONTINUED | OUTPATIENT
Start: 2022-01-01 | End: 2022-01-01

## 2022-01-01 RX ORDER — HALOPERIDOL 1 MG/1
1 TABLET ORAL EVERY 4 HOURS PRN
Status: DISCONTINUED | OUTPATIENT
Start: 2022-01-01 | End: 2022-01-01

## 2022-01-01 RX ORDER — NITROGLYCERIN 0.4 MG/1
0.4 TABLET SUBLINGUAL
Status: DISCONTINUED | OUTPATIENT
Start: 2022-01-01 | End: 2022-01-01

## 2022-01-01 RX ORDER — LORAZEPAM 2 MG/ML
2 CONCENTRATE ORAL
Status: CANCELLED | OUTPATIENT
Start: 2022-01-01 | End: 2022-01-01

## 2022-01-01 RX ORDER — LORAZEPAM 2 MG/ML
0.5 CONCENTRATE ORAL
Status: DISCONTINUED | OUTPATIENT
Start: 2022-01-01 | End: 2022-01-01 | Stop reason: HOSPADM

## 2022-01-01 RX ORDER — INSULIN LISPRO 100 [IU]/ML
0-9 INJECTION, SOLUTION INTRAVENOUS; SUBCUTANEOUS
Status: DISCONTINUED | OUTPATIENT
Start: 2022-01-01 | End: 2022-01-01

## 2022-01-01 RX ORDER — LORAZEPAM 2 MG/ML
0.5 INJECTION INTRAMUSCULAR
Status: DISCONTINUED | OUTPATIENT
Start: 2022-01-01 | End: 2022-01-01

## 2022-01-01 RX ORDER — IPRATROPIUM BROMIDE AND ALBUTEROL SULFATE 2.5; .5 MG/3ML; MG/3ML
3 SOLUTION RESPIRATORY (INHALATION) EVERY 4 HOURS PRN
Status: DISCONTINUED | OUTPATIENT
Start: 2022-01-01 | End: 2022-01-01

## 2022-01-01 RX ORDER — GLYCOPYRROLATE 0.2 MG/ML
0.4 INJECTION INTRAMUSCULAR; INTRAVENOUS
Status: DISCONTINUED | OUTPATIENT
Start: 2022-01-01 | End: 2022-01-01 | Stop reason: HOSPADM

## 2022-01-01 RX ORDER — MORPHINE SULFATE 2 MG/ML
2 INJECTION, SOLUTION INTRAMUSCULAR; INTRAVENOUS EVERY 4 HOURS PRN
Status: DISCONTINUED | OUTPATIENT
Start: 2022-01-01 | End: 2022-01-01 | Stop reason: HOSPADM

## 2022-01-01 RX ORDER — LORAZEPAM 2 MG/ML
0.5 CONCENTRATE ORAL
Status: CANCELLED | OUTPATIENT
Start: 2022-01-01 | End: 2022-01-01

## 2022-01-01 RX ORDER — GLYCOPYRROLATE 0.2 MG/ML
0.4 INJECTION INTRAMUSCULAR; INTRAVENOUS
Status: CANCELLED | OUTPATIENT
Start: 2022-01-01

## 2022-01-01 RX ORDER — CALCIUM CARBONATE 200(500)MG
2 TABLET,CHEWABLE ORAL 2 TIMES DAILY PRN
Status: DISCONTINUED | OUTPATIENT
Start: 2022-01-01 | End: 2022-01-01

## 2022-01-01 RX ORDER — METHYLPREDNISOLONE SODIUM SUCCINATE 125 MG/2ML
60 INJECTION, POWDER, LYOPHILIZED, FOR SOLUTION INTRAMUSCULAR; INTRAVENOUS EVERY 12 HOURS
Status: DISCONTINUED | OUTPATIENT
Start: 2022-01-01 | End: 2022-01-01

## 2022-01-01 RX ORDER — KETOROLAC TROMETHAMINE 15 MG/ML
15 INJECTION, SOLUTION INTRAMUSCULAR; INTRAVENOUS EVERY 6 HOURS PRN
Status: DISCONTINUED | OUTPATIENT
Start: 2022-01-01 | End: 2022-01-01

## 2022-01-01 RX ORDER — HYDROCODONE BITARTRATE AND ACETAMINOPHEN 5; 325 MG/1; MG/1
1 TABLET ORAL DAILY PRN
COMMUNITY

## 2022-01-01 RX ORDER — GLYCOPYRROLATE 0.2 MG/ML
0.2 INJECTION INTRAMUSCULAR; INTRAVENOUS
Status: DISCONTINUED | OUTPATIENT
Start: 2022-01-01 | End: 2022-01-01

## 2022-01-01 RX ORDER — GLYCOPYRROLATE 0.2 MG/ML
0.8 INJECTION INTRAMUSCULAR; INTRAVENOUS
Status: CANCELLED | OUTPATIENT
Start: 2022-01-01

## 2022-01-01 RX ORDER — HYDROMORPHONE HYDROCHLORIDE 1 MG/ML
0.5 INJECTION, SOLUTION INTRAMUSCULAR; INTRAVENOUS; SUBCUTANEOUS
Status: DISCONTINUED | OUTPATIENT
Start: 2022-01-01 | End: 2022-01-01

## 2022-01-01 RX ORDER — ACETAMINOPHEN 160 MG/5ML
650 SOLUTION ORAL EVERY 4 HOURS PRN
Status: CANCELLED | OUTPATIENT
Start: 2022-01-01

## 2022-01-01 RX ORDER — LORAZEPAM 2 MG/ML
0.5 INJECTION INTRAMUSCULAR ONCE
Status: DISCONTINUED | OUTPATIENT
Start: 2022-01-01 | End: 2022-01-01

## 2022-01-01 RX ORDER — ACETAMINOPHEN 160 MG/5ML
650 SOLUTION ORAL EVERY 4 HOURS PRN
Status: DISCONTINUED | OUTPATIENT
Start: 2022-01-01 | End: 2022-01-01

## 2022-01-01 RX ORDER — HYDROMORPHONE HCL 110MG/55ML
1.5 PATIENT CONTROLLED ANALGESIA SYRINGE INTRAVENOUS
Status: CANCELLED | OUTPATIENT
Start: 2022-01-01 | End: 2022-01-01

## 2022-01-01 RX ORDER — SODIUM CHLORIDE 9 MG/ML
100 INJECTION, SOLUTION INTRAVENOUS CONTINUOUS
Status: DISCONTINUED | OUTPATIENT
Start: 2022-01-01 | End: 2022-01-01

## 2022-01-01 RX ORDER — ONDANSETRON 2 MG/ML
4 INJECTION INTRAMUSCULAR; INTRAVENOUS EVERY 6 HOURS PRN
Status: DISCONTINUED | OUTPATIENT
Start: 2022-01-01 | End: 2022-01-01 | Stop reason: HOSPADM

## 2022-01-01 RX ORDER — HALOPERIDOL 5 MG/ML
1 INJECTION INTRAMUSCULAR EVERY 4 HOURS PRN
Status: DISCONTINUED | OUTPATIENT
Start: 2022-01-01 | End: 2022-01-01

## 2022-01-01 RX ORDER — HYDROMORPHONE HYDROCHLORIDE 1 MG/ML
0.5 INJECTION, SOLUTION INTRAMUSCULAR; INTRAVENOUS; SUBCUTANEOUS
Status: DISCONTINUED | OUTPATIENT
Start: 2022-01-01 | End: 2022-01-01 | Stop reason: HOSPADM

## 2022-01-01 RX ORDER — LORAZEPAM 2 MG/ML
0.5 INJECTION INTRAMUSCULAR
Status: CANCELLED | OUTPATIENT
Start: 2022-01-01 | End: 2022-01-01

## 2022-01-01 RX ORDER — HYDROMORPHONE HCL 110MG/55ML
2 PATIENT CONTROLLED ANALGESIA SYRINGE INTRAVENOUS
Status: DISCONTINUED | OUTPATIENT
Start: 2022-01-01 | End: 2022-01-01

## 2022-01-01 RX ORDER — GLYCOPYRROLATE 0.2 MG/ML
0.8 INJECTION INTRAMUSCULAR; INTRAVENOUS
Status: DISCONTINUED | OUTPATIENT
Start: 2022-01-01 | End: 2022-01-01 | Stop reason: HOSPADM

## 2022-01-01 RX ORDER — ACETAMINOPHEN 325 MG/1
650 TABLET ORAL EVERY 4 HOURS PRN
Status: DISCONTINUED | OUTPATIENT
Start: 2022-01-01 | End: 2022-01-01 | Stop reason: HOSPADM

## 2022-01-01 RX ORDER — ONDANSETRON 2 MG/ML
4 INJECTION INTRAMUSCULAR; INTRAVENOUS EVERY 6 HOURS PRN
Status: DISCONTINUED | OUTPATIENT
Start: 2022-01-01 | End: 2022-01-01

## 2022-01-01 RX ORDER — IPRATROPIUM BROMIDE AND ALBUTEROL SULFATE 2.5; .5 MG/3ML; MG/3ML
3 SOLUTION RESPIRATORY (INHALATION)
Status: DISCONTINUED | OUTPATIENT
Start: 2022-01-01 | End: 2022-01-01

## 2022-01-01 RX ORDER — ALBUTEROL SULFATE 90 UG/1
2 AEROSOL, METERED RESPIRATORY (INHALATION) ONCE
Status: DISCONTINUED | OUTPATIENT
Start: 2022-01-01 | End: 2022-01-01

## 2022-01-01 RX ORDER — HYDROMORPHONE HCL 110MG/55ML
1.5 PATIENT CONTROLLED ANALGESIA SYRINGE INTRAVENOUS
Status: DISCONTINUED | OUTPATIENT
Start: 2022-01-01 | End: 2022-01-01

## 2022-01-01 RX ORDER — LORAZEPAM 2 MG/ML
1 CONCENTRATE ORAL
Status: DISCONTINUED | OUTPATIENT
Start: 2022-01-01 | End: 2022-01-01

## 2022-01-01 RX ORDER — ONDANSETRON 4 MG/1
4 TABLET, FILM COATED ORAL EVERY 6 HOURS PRN
Status: DISCONTINUED | OUTPATIENT
Start: 2022-01-01 | End: 2022-01-01

## 2022-01-01 RX ORDER — MORPHINE SULFATE 20 MG/ML
10 SOLUTION ORAL
Status: DISCONTINUED | OUTPATIENT
Start: 2022-01-01 | End: 2022-01-01

## 2022-01-01 RX ORDER — LORAZEPAM 2 MG/ML
0.5 INJECTION INTRAMUSCULAR
Status: DISCONTINUED | OUTPATIENT
Start: 2022-01-01 | End: 2022-01-01 | Stop reason: HOSPADM

## 2022-01-01 RX ORDER — ACETAMINOPHEN 160 MG/5ML
650 SOLUTION ORAL EVERY 4 HOURS PRN
Status: DISCONTINUED | OUTPATIENT
Start: 2022-01-01 | End: 2022-01-01 | Stop reason: HOSPADM

## 2022-01-01 RX ORDER — ACETAMINOPHEN 650 MG/1
650 SUPPOSITORY RECTAL EVERY 4 HOURS PRN
Status: DISCONTINUED | OUTPATIENT
Start: 2022-01-01 | End: 2022-01-01

## 2022-01-01 RX ORDER — LORAZEPAM 2 MG/ML
1 CONCENTRATE ORAL
Status: CANCELLED | OUTPATIENT
Start: 2022-01-01 | End: 2022-01-01

## 2022-01-01 RX ORDER — LORAZEPAM 2 MG/ML
2 INJECTION INTRAMUSCULAR
Status: CANCELLED | OUTPATIENT
Start: 2022-01-01 | End: 2022-01-01

## 2022-01-01 RX ORDER — DIPHENOXYLATE HYDROCHLORIDE AND ATROPINE SULFATE 2.5; .025 MG/1; MG/1
1 TABLET ORAL
Status: DISCONTINUED | OUTPATIENT
Start: 2022-01-01 | End: 2022-01-01

## 2022-01-01 RX ORDER — INSULIN LISPRO 100 [IU]/ML
0-7 INJECTION, SOLUTION INTRAVENOUS; SUBCUTANEOUS
Status: DISCONTINUED | OUTPATIENT
Start: 2022-01-01 | End: 2022-01-01

## 2022-01-01 RX ORDER — LORAZEPAM 2 MG/ML
2 INJECTION INTRAMUSCULAR
Status: DISCONTINUED | OUTPATIENT
Start: 2022-01-01 | End: 2022-01-01

## 2022-01-01 RX ORDER — METHYLPREDNISOLONE SODIUM SUCCINATE 125 MG/2ML
125 INJECTION, POWDER, LYOPHILIZED, FOR SOLUTION INTRAMUSCULAR; INTRAVENOUS ONCE
Status: COMPLETED | OUTPATIENT
Start: 2022-01-01 | End: 2022-01-01

## 2022-01-01 RX ORDER — MORPHINE SULFATE 10 MG/ML
4 INJECTION INTRAMUSCULAR; INTRAVENOUS; SUBCUTANEOUS
Status: DISCONTINUED | OUTPATIENT
Start: 2022-01-01 | End: 2022-01-01

## 2022-01-01 RX ORDER — METHYLPREDNISOLONE SODIUM SUCCINATE 125 MG/2ML
125 INJECTION, POWDER, LYOPHILIZED, FOR SOLUTION INTRAMUSCULAR; INTRAVENOUS ONCE
Status: DISCONTINUED | OUTPATIENT
Start: 2022-01-01 | End: 2022-01-01

## 2022-01-01 RX ORDER — BUSPIRONE HYDROCHLORIDE 5 MG/1
5 TABLET ORAL 2 TIMES DAILY
COMMUNITY

## 2022-01-01 RX ORDER — LORAZEPAM 2 MG/ML
2 CONCENTRATE ORAL
Status: DISCONTINUED | OUTPATIENT
Start: 2022-01-01 | End: 2022-01-01 | Stop reason: HOSPADM

## 2022-01-01 RX ORDER — LORAZEPAM 2 MG/ML
0.5 INJECTION INTRAMUSCULAR ONCE
Status: COMPLETED | OUTPATIENT
Start: 2022-01-01 | End: 2022-01-01

## 2022-01-01 RX ORDER — HYDROMORPHONE HCL 110MG/55ML
1.5 PATIENT CONTROLLED ANALGESIA SYRINGE INTRAVENOUS
Status: DISCONTINUED | OUTPATIENT
Start: 2022-01-01 | End: 2022-01-01 | Stop reason: HOSPADM

## 2022-01-01 RX ORDER — MORPHINE SULFATE 10 MG/ML
6 INJECTION INTRAMUSCULAR; INTRAVENOUS; SUBCUTANEOUS
Status: DISCONTINUED | OUTPATIENT
Start: 2022-01-01 | End: 2022-01-01

## 2022-01-01 RX ORDER — LORAZEPAM 2 MG/ML
1 INJECTION INTRAMUSCULAR
Status: DISCONTINUED | OUTPATIENT
Start: 2022-01-01 | End: 2022-01-01

## 2022-01-01 RX ORDER — LORAZEPAM 2 MG/ML
2 INJECTION INTRAMUSCULAR
Status: DISCONTINUED | OUTPATIENT
Start: 2022-01-01 | End: 2022-01-01 | Stop reason: HOSPADM

## 2022-01-01 RX ORDER — CLONAZEPAM 0.5 MG/1
0.25 TABLET ORAL NIGHTLY
Status: DISCONTINUED | OUTPATIENT
Start: 2022-01-01 | End: 2022-01-01 | Stop reason: HOSPADM

## 2022-01-01 RX ORDER — NICOTINE POLACRILEX 4 MG
15 LOZENGE BUCCAL
Status: DISCONTINUED | OUTPATIENT
Start: 2022-01-01 | End: 2022-01-01 | Stop reason: HOSPADM

## 2022-01-01 RX ORDER — LANSOPRAZOLE 30 MG/1
30 CAPSULE, DELAYED RELEASE ORAL DAILY
COMMUNITY

## 2022-01-01 RX ORDER — ALBUTEROL SULFATE 2.5 MG/3ML
2.5 SOLUTION RESPIRATORY (INHALATION) ONCE
Status: COMPLETED | OUTPATIENT
Start: 2022-01-01 | End: 2022-01-01

## 2022-01-01 RX ORDER — HYDROMORPHONE HCL 110MG/55ML
2 PATIENT CONTROLLED ANALGESIA SYRINGE INTRAVENOUS
Status: DISCONTINUED | OUTPATIENT
Start: 2022-01-01 | End: 2022-01-01 | Stop reason: HOSPADM

## 2022-01-01 RX ORDER — DEXTROSE, SODIUM CHLORIDE, AND POTASSIUM CHLORIDE 5; .45; .15 G/100ML; G/100ML; G/100ML
125 INJECTION INTRAVENOUS
Status: DISPENSED | OUTPATIENT
Start: 2022-01-01 | End: 2022-01-01

## 2022-01-01 RX ADMIN — LORAZEPAM 2 MG: 2 INJECTION INTRAMUSCULAR; INTRAVENOUS at 20:44

## 2022-01-01 RX ADMIN — HYDROMORPHONE HYDROCHLORIDE 2 MG: 2 INJECTION, SOLUTION INTRAMUSCULAR; INTRAVENOUS; SUBCUTANEOUS at 04:37

## 2022-01-01 RX ADMIN — HYDROMORPHONE HYDROCHLORIDE 2 MG: 2 INJECTION, SOLUTION INTRAMUSCULAR; INTRAVENOUS; SUBCUTANEOUS at 20:33

## 2022-01-01 RX ADMIN — LORAZEPAM 2 MG: 2 INJECTION INTRAMUSCULAR; INTRAVENOUS at 08:46

## 2022-01-01 RX ADMIN — GLYCOPYRROLATE 0.8 MG: 0.2 INJECTION INTRAMUSCULAR; INTRAVENOUS at 12:39

## 2022-01-01 RX ADMIN — LORAZEPAM 2 MG: 2 INJECTION INTRAMUSCULAR; INTRAVENOUS at 04:06

## 2022-01-01 RX ADMIN — HYDROMORPHONE HYDROCHLORIDE 1.5 MG: 2 INJECTION, SOLUTION INTRAMUSCULAR; INTRAVENOUS; SUBCUTANEOUS at 01:15

## 2022-01-01 RX ADMIN — SODIUM CHLORIDE 500 ML: 9 INJECTION, SOLUTION INTRAVENOUS at 16:58

## 2022-01-01 RX ADMIN — LORAZEPAM 2 MG: 2 INJECTION INTRAMUSCULAR; INTRAVENOUS at 04:34

## 2022-01-01 RX ADMIN — HYDROMORPHONE HYDROCHLORIDE 2 MG: 2 INJECTION, SOLUTION INTRAMUSCULAR; INTRAVENOUS; SUBCUTANEOUS at 04:49

## 2022-01-01 RX ADMIN — GLYCOPYRROLATE 0.8 MG: 0.2 INJECTION INTRAMUSCULAR; INTRAVENOUS at 17:05

## 2022-01-01 RX ADMIN — LORAZEPAM 2 MG: 2 INJECTION INTRAMUSCULAR; INTRAVENOUS at 00:42

## 2022-01-01 RX ADMIN — LORAZEPAM 2 MG: 2 INJECTION INTRAMUSCULAR; INTRAVENOUS at 16:40

## 2022-01-01 RX ADMIN — IPRATROPIUM BROMIDE AND ALBUTEROL SULFATE 3 ML: 2.5; .5 SOLUTION RESPIRATORY (INHALATION) at 07:10

## 2022-01-01 RX ADMIN — Medication 10 ML: at 00:02

## 2022-01-01 RX ADMIN — HALOPERIDOL LACTATE 1 MG: 5 INJECTION, SOLUTION INTRAMUSCULAR at 21:52

## 2022-01-01 RX ADMIN — HYDROMORPHONE HYDROCHLORIDE 1.5 MG: 2 INJECTION, SOLUTION INTRAMUSCULAR; INTRAVENOUS; SUBCUTANEOUS at 08:46

## 2022-01-01 RX ADMIN — HYDROMORPHONE HYDROCHLORIDE 2 MG: 2 INJECTION, SOLUTION INTRAMUSCULAR; INTRAVENOUS; SUBCUTANEOUS at 00:29

## 2022-01-01 RX ADMIN — GLYCOPYRROLATE 0.8 MG: 0.2 INJECTION INTRAMUSCULAR; INTRAVENOUS at 05:14

## 2022-01-01 RX ADMIN — LORAZEPAM 2 MG: 2 INJECTION INTRAMUSCULAR; INTRAVENOUS at 01:15

## 2022-01-01 RX ADMIN — HYDROMORPHONE HYDROCHLORIDE 2 MG: 2 INJECTION, SOLUTION INTRAMUSCULAR; INTRAVENOUS; SUBCUTANEOUS at 08:36

## 2022-01-01 RX ADMIN — VANCOMYCIN HYDROCHLORIDE 1250 MG: 10 INJECTION, POWDER, LYOPHILIZED, FOR SOLUTION INTRAVENOUS at 20:59

## 2022-01-01 RX ADMIN — LORAZEPAM 2 MG: 2 INJECTION INTRAMUSCULAR; INTRAVENOUS at 12:39

## 2022-01-01 RX ADMIN — GLYCOPYRROLATE 0.8 MG: 0.2 INJECTION INTRAMUSCULAR; INTRAVENOUS at 04:34

## 2022-01-01 RX ADMIN — HYDROMORPHONE HYDROCHLORIDE 1 MG: 1 INJECTION, SOLUTION INTRAMUSCULAR; INTRAVENOUS; SUBCUTANEOUS at 20:48

## 2022-01-01 RX ADMIN — GLYCOPYRROLATE 0.4 MG: 0.2 INJECTION INTRAMUSCULAR; INTRAVENOUS at 08:43

## 2022-01-01 RX ADMIN — LORAZEPAM 2 MG: 2 INJECTION INTRAMUSCULAR; INTRAVENOUS at 13:06

## 2022-01-01 RX ADMIN — HYDROMORPHONE HYDROCHLORIDE 2 MG: 2 INJECTION, SOLUTION INTRAMUSCULAR; INTRAVENOUS; SUBCUTANEOUS at 16:42

## 2022-01-01 RX ADMIN — GLYCOPYRROLATE 0.4 MG: 0.2 INJECTION INTRAMUSCULAR; INTRAVENOUS at 11:13

## 2022-01-01 RX ADMIN — GLYCOPYRROLATE 0.8 MG: 0.2 INJECTION INTRAMUSCULAR; INTRAVENOUS at 08:46

## 2022-01-01 RX ADMIN — DIATRIZOATE MEGLUMINE AND DIATRIZOATE SODIUM 60 ML: 600; 100 SOLUTION ORAL; RECTAL at 14:25

## 2022-01-01 RX ADMIN — HYDROMORPHONE HYDROCHLORIDE 2 MG: 2 INJECTION, SOLUTION INTRAMUSCULAR; INTRAVENOUS; SUBCUTANEOUS at 17:20

## 2022-01-01 RX ADMIN — DIATRIZOATE MEGLUMINE AND DIATRIZOATE SODIUM 14 ML: 600; 100 SOLUTION ORAL; RECTAL at 09:00

## 2022-01-01 RX ADMIN — LORAZEPAM 2 MG: 2 INJECTION INTRAMUSCULAR; INTRAVENOUS at 00:14

## 2022-01-01 RX ADMIN — GLYCOPYRROLATE 0.4 MG: 0.2 INJECTION INTRAMUSCULAR; INTRAVENOUS at 20:48

## 2022-01-01 RX ADMIN — GLYCOPYRROLATE 0.8 MG: 0.2 INJECTION INTRAMUSCULAR; INTRAVENOUS at 08:26

## 2022-01-01 RX ADMIN — ONDANSETRON 4 MG: 2 INJECTION INTRAMUSCULAR; INTRAVENOUS at 01:15

## 2022-01-01 RX ADMIN — HYDROMORPHONE HYDROCHLORIDE 1 MG: 1 INJECTION, SOLUTION INTRAMUSCULAR; INTRAVENOUS; SUBCUTANEOUS at 00:14

## 2022-01-01 RX ADMIN — HYDROMORPHONE HYDROCHLORIDE 2 MG: 2 INJECTION, SOLUTION INTRAMUSCULAR; INTRAVENOUS; SUBCUTANEOUS at 12:53

## 2022-01-01 RX ADMIN — GLYCOPYRROLATE 0.8 MG: 0.2 INJECTION INTRAMUSCULAR; INTRAVENOUS at 20:36

## 2022-01-01 RX ADMIN — HYDROMORPHONE HYDROCHLORIDE 2 MG: 2 INJECTION, SOLUTION INTRAMUSCULAR; INTRAVENOUS; SUBCUTANEOUS at 17:05

## 2022-01-01 RX ADMIN — GLYCOPYRROLATE 0.4 MG: 0.2 INJECTION INTRAMUSCULAR; INTRAVENOUS at 14:47

## 2022-01-01 RX ADMIN — HYDROMORPHONE HYDROCHLORIDE 1 MG: 1 INJECTION, SOLUTION INTRAMUSCULAR; INTRAVENOUS; SUBCUTANEOUS at 18:24

## 2022-01-01 RX ADMIN — LORAZEPAM 2 MG: 2 INJECTION INTRAMUSCULAR; INTRAVENOUS at 16:51

## 2022-01-01 RX ADMIN — LORAZEPAM 2 MG: 2 INJECTION INTRAMUSCULAR; INTRAVENOUS at 08:43

## 2022-01-01 RX ADMIN — LORAZEPAM 2 MG: 2 INJECTION INTRAMUSCULAR; INTRAVENOUS at 12:48

## 2022-01-01 RX ADMIN — GLYCOPYRROLATE 0.4 MG: 0.2 INJECTION INTRAMUSCULAR; INTRAVENOUS at 18:24

## 2022-01-01 RX ADMIN — LORAZEPAM 2 MG: 2 INJECTION INTRAMUSCULAR; INTRAVENOUS at 04:40

## 2022-01-01 RX ADMIN — HYDROMORPHONE HYDROCHLORIDE 2 MG: 2 INJECTION, SOLUTION INTRAMUSCULAR; INTRAVENOUS; SUBCUTANEOUS at 13:16

## 2022-01-01 RX ADMIN — GLYCOPYRROLATE 0.8 MG: 0.2 INJECTION INTRAMUSCULAR; INTRAVENOUS at 20:47

## 2022-01-01 RX ADMIN — HYDROMORPHONE HYDROCHLORIDE 2 MG: 2 INJECTION, SOLUTION INTRAMUSCULAR; INTRAVENOUS; SUBCUTANEOUS at 20:45

## 2022-01-01 RX ADMIN — GLYCOPYRROLATE 0.8 MG: 0.2 INJECTION INTRAMUSCULAR; INTRAVENOUS at 12:52

## 2022-01-01 RX ADMIN — GLYCOPYRROLATE 0.4 MG: 0.2 INJECTION INTRAMUSCULAR; INTRAVENOUS at 04:08

## 2022-01-01 RX ADMIN — HYDROMORPHONE HYDROCHLORIDE 2 MG: 2 INJECTION, SOLUTION INTRAMUSCULAR; INTRAVENOUS; SUBCUTANEOUS at 04:41

## 2022-01-01 RX ADMIN — LORAZEPAM 2 MG: 2 INJECTION INTRAMUSCULAR; INTRAVENOUS at 04:35

## 2022-01-01 RX ADMIN — HYDROMORPHONE HYDROCHLORIDE 2 MG: 2 INJECTION, SOLUTION INTRAMUSCULAR; INTRAVENOUS; SUBCUTANEOUS at 17:55

## 2022-01-01 RX ADMIN — MORPHINE SULFATE 4 MG: 10 INJECTION, SOLUTION INTRAMUSCULAR; INTRAVENOUS at 12:24

## 2022-01-01 RX ADMIN — HYDROMORPHONE HYDROCHLORIDE 1 MG: 1 INJECTION, SOLUTION INTRAMUSCULAR; INTRAVENOUS; SUBCUTANEOUS at 08:43

## 2022-01-01 RX ADMIN — HYDROMORPHONE HYDROCHLORIDE 1.5 MG: 2 INJECTION, SOLUTION INTRAMUSCULAR; INTRAVENOUS; SUBCUTANEOUS at 16:40

## 2022-01-01 RX ADMIN — GLYCOPYRROLATE 0.8 MG: 0.2 INJECTION INTRAMUSCULAR; INTRAVENOUS at 20:44

## 2022-01-01 RX ADMIN — LORAZEPAM 2 MG: 2 INJECTION INTRAMUSCULAR; INTRAVENOUS at 08:26

## 2022-01-01 RX ADMIN — LORAZEPAM 2 MG: 2 INJECTION INTRAMUSCULAR; INTRAVENOUS at 00:05

## 2022-01-01 RX ADMIN — HYDROMORPHONE HYDROCHLORIDE 2 MG: 2 INJECTION, SOLUTION INTRAMUSCULAR; INTRAVENOUS; SUBCUTANEOUS at 00:51

## 2022-01-01 RX ADMIN — LORAZEPAM 2 MG: 2 INJECTION INTRAMUSCULAR; INTRAVENOUS at 00:51

## 2022-01-01 RX ADMIN — LORAZEPAM 2 MG: 2 INJECTION INTRAMUSCULAR; INTRAVENOUS at 16:42

## 2022-01-01 RX ADMIN — LORAZEPAM 2 MG: 2 INJECTION INTRAMUSCULAR; INTRAVENOUS at 17:20

## 2022-01-01 RX ADMIN — GLYCOPYRROLATE 0.8 MG: 0.2 INJECTION INTRAMUSCULAR; INTRAVENOUS at 21:02

## 2022-01-01 RX ADMIN — IOPAMIDOL 100 ML: 612 INJECTION, SOLUTION INTRAVENOUS at 22:33

## 2022-01-01 RX ADMIN — GLYCOPYRROLATE 0.8 MG: 0.2 INJECTION INTRAMUSCULAR; INTRAVENOUS at 04:49

## 2022-01-01 RX ADMIN — LORAZEPAM 2 MG: 2 INJECTION INTRAMUSCULAR; INTRAVENOUS at 08:31

## 2022-01-01 RX ADMIN — LORAZEPAM 2 MG: 2 INJECTION INTRAMUSCULAR; INTRAVENOUS at 17:05

## 2022-01-01 RX ADMIN — LORAZEPAM 2 MG: 2 INJECTION INTRAMUSCULAR; INTRAVENOUS at 00:34

## 2022-01-01 RX ADMIN — LORAZEPAM 2 MG: 2 INJECTION INTRAMUSCULAR; INTRAVENOUS at 20:33

## 2022-01-01 RX ADMIN — HYDROMORPHONE HYDROCHLORIDE 2 MG: 2 INJECTION, SOLUTION INTRAMUSCULAR; INTRAVENOUS; SUBCUTANEOUS at 08:31

## 2022-01-01 RX ADMIN — HYDROMORPHONE HYDROCHLORIDE 1 MG: 1 INJECTION, SOLUTION INTRAMUSCULAR; INTRAVENOUS; SUBCUTANEOUS at 04:06

## 2022-01-01 RX ADMIN — HYDROMORPHONE HYDROCHLORIDE 1 MG: 1 INJECTION, SOLUTION INTRAMUSCULAR; INTRAVENOUS; SUBCUTANEOUS at 14:44

## 2022-01-01 RX ADMIN — LORAZEPAM 2 MG: 2 INJECTION INTRAMUSCULAR; INTRAVENOUS at 13:16

## 2022-01-01 RX ADMIN — GLYCOPYRROLATE 0.4 MG: 0.2 INJECTION INTRAMUSCULAR; INTRAVENOUS at 00:14

## 2022-01-01 RX ADMIN — ALBUTEROL SULFATE 2.5 MG: 2.5 SOLUTION RESPIRATORY (INHALATION) at 21:59

## 2022-01-01 RX ADMIN — LORAZEPAM 2 MG: 2 INJECTION INTRAMUSCULAR; INTRAVENOUS at 17:55

## 2022-01-01 RX ADMIN — GLYCOPYRROLATE 0.8 MG: 0.2 INJECTION INTRAMUSCULAR; INTRAVENOUS at 16:51

## 2022-01-01 RX ADMIN — SODIUM CHLORIDE 500 ML: 9 INJECTION, SOLUTION INTRAVENOUS at 01:45

## 2022-01-01 RX ADMIN — LORAZEPAM 2 MG: 2 INJECTION INTRAMUSCULAR; INTRAVENOUS at 00:29

## 2022-01-01 RX ADMIN — TAZOBACTAM SODIUM AND PIPERACILLIN SODIUM 3.38 G: 375; 3 INJECTION, SOLUTION INTRAVENOUS at 20:09

## 2022-01-01 RX ADMIN — HYDROMORPHONE HYDROCHLORIDE 1.5 MG: 2 INJECTION, SOLUTION INTRAMUSCULAR; INTRAVENOUS; SUBCUTANEOUS at 05:14

## 2022-01-01 RX ADMIN — LORAZEPAM 2 MG: 2 INJECTION INTRAMUSCULAR; INTRAVENOUS at 04:32

## 2022-01-01 RX ADMIN — HYDROMORPHONE HYDROCHLORIDE 2 MG: 2 INJECTION, SOLUTION INTRAMUSCULAR; INTRAVENOUS; SUBCUTANEOUS at 21:02

## 2022-01-01 RX ADMIN — METHYLPREDNISOLONE SODIUM SUCCINATE 125 MG: 125 INJECTION, POWDER, FOR SOLUTION INTRAMUSCULAR; INTRAVENOUS at 21:29

## 2022-01-01 RX ADMIN — GLYCOPYRROLATE 0.8 MG: 0.2 INJECTION INTRAMUSCULAR; INTRAVENOUS at 12:44

## 2022-01-01 RX ADMIN — LORAZEPAM 2 MG: 2 INJECTION INTRAMUSCULAR; INTRAVENOUS at 08:36

## 2022-01-01 RX ADMIN — LORAZEPAM 2 MG: 2 INJECTION INTRAMUSCULAR; INTRAVENOUS at 00:39

## 2022-01-01 RX ADMIN — GLYCOPYRROLATE 0.8 MG: 0.2 INJECTION INTRAMUSCULAR; INTRAVENOUS at 13:06

## 2022-01-01 RX ADMIN — HYDROMORPHONE HYDROCHLORIDE 1 MG: 1 INJECTION, SOLUTION INTRAMUSCULAR; INTRAVENOUS; SUBCUTANEOUS at 00:05

## 2022-01-01 RX ADMIN — GLYCOPYRROLATE 0.8 MG: 0.2 INJECTION INTRAMUSCULAR; INTRAVENOUS at 08:31

## 2022-01-01 RX ADMIN — GLYCOPYRROLATE 0.8 MG: 0.2 INJECTION INTRAMUSCULAR; INTRAVENOUS at 00:41

## 2022-01-01 RX ADMIN — LORAZEPAM 2 MG: 2 INJECTION INTRAMUSCULAR; INTRAVENOUS at 12:45

## 2022-01-01 RX ADMIN — LORAZEPAM 2 MG: 2 INJECTION INTRAMUSCULAR; INTRAVENOUS at 20:47

## 2022-01-01 RX ADMIN — HYDROMORPHONE HYDROCHLORIDE 2 MG: 2 INJECTION, SOLUTION INTRAMUSCULAR; INTRAVENOUS; SUBCUTANEOUS at 04:35

## 2022-01-01 RX ADMIN — LORAZEPAM 2 MG: 2 INJECTION INTRAMUSCULAR; INTRAVENOUS at 20:36

## 2022-01-01 RX ADMIN — GLYCOPYRROLATE 0.8 MG: 0.2 INJECTION INTRAMUSCULAR; INTRAVENOUS at 16:42

## 2022-01-01 RX ADMIN — GLYCOPYRROLATE 0.8 MG: 0.2 INJECTION INTRAMUSCULAR; INTRAVENOUS at 16:40

## 2022-01-01 RX ADMIN — LORAZEPAM 2 MG: 2 INJECTION INTRAMUSCULAR; INTRAVENOUS at 20:48

## 2022-01-01 RX ADMIN — GLYCOPYRROLATE 0.8 MG: 0.2 INJECTION INTRAMUSCULAR; INTRAVENOUS at 16:46

## 2022-01-01 RX ADMIN — GLYCOPYRROLATE 0.8 MG: 0.2 INJECTION INTRAMUSCULAR; INTRAVENOUS at 01:15

## 2022-01-01 RX ADMIN — GLYCOPYRROLATE 0.8 MG: 0.2 INJECTION INTRAMUSCULAR; INTRAVENOUS at 20:43

## 2022-01-01 RX ADMIN — LORAZEPAM 2 MG: 2 INJECTION INTRAMUSCULAR; INTRAVENOUS at 05:14

## 2022-01-01 RX ADMIN — GLYCOPYRROLATE 0.4 MG: 0.2 INJECTION INTRAMUSCULAR; INTRAVENOUS at 16:41

## 2022-01-01 RX ADMIN — SODIUM CHLORIDE 100 ML/HR: 9 INJECTION, SOLUTION INTRAVENOUS at 05:28

## 2022-01-01 RX ADMIN — HYDROMORPHONE HYDROCHLORIDE 1.5 MG: 2 INJECTION, SOLUTION INTRAMUSCULAR; INTRAVENOUS; SUBCUTANEOUS at 20:36

## 2022-01-01 RX ADMIN — HYDROMORPHONE HYDROCHLORIDE 2 MG: 2 INJECTION, SOLUTION INTRAMUSCULAR; INTRAVENOUS; SUBCUTANEOUS at 04:32

## 2022-01-01 RX ADMIN — GLYCOPYRROLATE 0.8 MG: 0.2 INJECTION INTRAMUSCULAR; INTRAVENOUS at 08:36

## 2022-01-01 RX ADMIN — INSULIN LISPRO 2 UNITS: 100 INJECTION, SOLUTION INTRAVENOUS; SUBCUTANEOUS at 00:01

## 2022-01-01 RX ADMIN — LORAZEPAM 2 MG: 2 INJECTION INTRAMUSCULAR; INTRAVENOUS at 12:53

## 2022-01-01 RX ADMIN — GLYCOPYRROLATE 0.4 MG: 0.2 INJECTION INTRAMUSCULAR; INTRAVENOUS at 12:48

## 2022-01-01 RX ADMIN — GLYCOPYRROLATE 0.8 MG: 0.2 INJECTION INTRAMUSCULAR; INTRAVENOUS at 08:35

## 2022-01-01 RX ADMIN — GLYCOPYRROLATE 0.8 MG: 0.2 INJECTION INTRAMUSCULAR; INTRAVENOUS at 17:20

## 2022-01-01 RX ADMIN — HYDROMORPHONE HYDROCHLORIDE 1 MG: 1 INJECTION, SOLUTION INTRAMUSCULAR; INTRAVENOUS; SUBCUTANEOUS at 12:48

## 2022-01-01 RX ADMIN — LORAZEPAM 2 MG: 2 INJECTION INTRAMUSCULAR; INTRAVENOUS at 14:41

## 2022-01-01 RX ADMIN — DIVALPROEX SODIUM 125 MG: 125 CAPSULE, COATED PELLETS ORAL at 13:50

## 2022-01-01 RX ADMIN — HYDROMORPHONE HYDROCHLORIDE 1 MG: 1 INJECTION, SOLUTION INTRAMUSCULAR; INTRAVENOUS; SUBCUTANEOUS at 00:34

## 2022-01-01 RX ADMIN — GLYCOPYRROLATE 0.4 MG: 0.2 INJECTION INTRAMUSCULAR; INTRAVENOUS at 00:05

## 2022-01-01 RX ADMIN — LORAZEPAM 2 MG: 2 INJECTION INTRAMUSCULAR; INTRAVENOUS at 21:02

## 2022-01-01 RX ADMIN — HYDROMORPHONE HYDROCHLORIDE 2 MG: 2 INJECTION, SOLUTION INTRAMUSCULAR; INTRAVENOUS; SUBCUTANEOUS at 16:47

## 2022-01-01 RX ADMIN — HYDROMORPHONE HYDROCHLORIDE 2 MG: 2 INJECTION, SOLUTION INTRAMUSCULAR; INTRAVENOUS; SUBCUTANEOUS at 12:45

## 2022-01-01 RX ADMIN — HYDROMORPHONE HYDROCHLORIDE 1.5 MG: 2 INJECTION, SOLUTION INTRAMUSCULAR; INTRAVENOUS; SUBCUTANEOUS at 20:47

## 2022-01-01 RX ADMIN — SODIUM CHLORIDE, POTASSIUM CHLORIDE, SODIUM LACTATE AND CALCIUM CHLORIDE 2000 ML: 600; 310; 30; 20 INJECTION, SOLUTION INTRAVENOUS at 20:08

## 2022-01-01 RX ADMIN — HYDROMORPHONE HYDROCHLORIDE 1.5 MG: 2 INJECTION, SOLUTION INTRAMUSCULAR; INTRAVENOUS; SUBCUTANEOUS at 12:39

## 2022-01-01 RX ADMIN — HYDROMORPHONE HYDROCHLORIDE 1.5 MG: 2 INJECTION, SOLUTION INTRAMUSCULAR; INTRAVENOUS; SUBCUTANEOUS at 13:06

## 2022-01-01 RX ADMIN — HYDROMORPHONE HYDROCHLORIDE 1 MG: 1 INJECTION, SOLUTION INTRAMUSCULAR; INTRAVENOUS; SUBCUTANEOUS at 16:40

## 2022-01-01 RX ADMIN — POTASSIUM CHLORIDE, DEXTROSE MONOHYDRATE AND SODIUM CHLORIDE 125 ML/HR: 150; 5; 450 INJECTION, SOLUTION INTRAVENOUS at 17:23

## 2022-01-01 RX ADMIN — LORAZEPAM 2 MG: 2 INJECTION INTRAMUSCULAR; INTRAVENOUS at 16:46

## 2022-01-01 RX ADMIN — GLYCOPYRROLATE 0.8 MG: 0.2 INJECTION INTRAMUSCULAR; INTRAVENOUS at 00:50

## 2022-01-01 RX ADMIN — LORAZEPAM 2 MG: 2 INJECTION INTRAMUSCULAR; INTRAVENOUS at 04:49

## 2022-01-01 RX ADMIN — HYDROMORPHONE HYDROCHLORIDE 1.5 MG: 2 INJECTION, SOLUTION INTRAMUSCULAR; INTRAVENOUS; SUBCUTANEOUS at 16:51

## 2022-01-01 RX ADMIN — GLYCOPYRROLATE 0.8 MG: 0.2 INJECTION INTRAMUSCULAR; INTRAVENOUS at 00:28

## 2022-01-01 RX ADMIN — HYDROMORPHONE HYDROCHLORIDE 2 MG: 2 INJECTION, SOLUTION INTRAMUSCULAR; INTRAVENOUS; SUBCUTANEOUS at 08:26

## 2022-01-01 RX ADMIN — LORAZEPAM 2 MG: 2 INJECTION INTRAMUSCULAR; INTRAVENOUS at 18:24

## 2022-01-01 RX ADMIN — HYDROMORPHONE HYDROCHLORIDE 2 MG: 2 INJECTION, SOLUTION INTRAMUSCULAR; INTRAVENOUS; SUBCUTANEOUS at 00:41

## 2022-01-01 RX ADMIN — LORAZEPAM 0.5 MG: 2 INJECTION INTRAMUSCULAR; INTRAVENOUS at 02:33

## 2022-01-01 RX ADMIN — LORAZEPAM 1 MG: 2 INJECTION INTRAMUSCULAR; INTRAVENOUS at 12:24

## 2022-01-01 RX ADMIN — MORPHINE SULFATE 4 MG: 10 INJECTION, SOLUTION INTRAMUSCULAR; INTRAVENOUS at 11:14

## 2022-01-01 RX ADMIN — HYDROMORPHONE HYDROCHLORIDE 1 MG: 1 INJECTION, SOLUTION INTRAMUSCULAR; INTRAVENOUS; SUBCUTANEOUS at 08:46

## 2022-01-01 RX ADMIN — LORAZEPAM 1 MG: 2 INJECTION INTRAMUSCULAR; INTRAVENOUS at 11:13

## 2022-01-01 RX ADMIN — HYDROMORPHONE HYDROCHLORIDE 1 MG: 1 INJECTION, SOLUTION INTRAMUSCULAR; INTRAVENOUS; SUBCUTANEOUS at 04:34

## 2022-01-01 RX ADMIN — HYDROMORPHONE HYDROCHLORIDE 2 MG: 2 INJECTION, SOLUTION INTRAMUSCULAR; INTRAVENOUS; SUBCUTANEOUS at 12:48

## 2022-01-01 RX ADMIN — GLYCOPYRROLATE 0.8 MG: 0.2 INJECTION INTRAMUSCULAR; INTRAVENOUS at 04:36

## 2022-01-01 RX ADMIN — LORAZEPAM 2 MG: 2 INJECTION INTRAMUSCULAR; INTRAVENOUS at 04:37

## 2022-01-01 RX ADMIN — HYDROMORPHONE HYDROCHLORIDE 2 MG: 2 INJECTION, SOLUTION INTRAMUSCULAR; INTRAVENOUS; SUBCUTANEOUS at 00:39

## 2022-01-01 RX ADMIN — HYDROMORPHONE HYDROCHLORIDE 2 MG: 2 INJECTION, SOLUTION INTRAMUSCULAR; INTRAVENOUS; SUBCUTANEOUS at 20:44

## 2022-02-08 PROBLEM — T85.528A DISLODGED GASTROSTOMY TUBE: Status: ACTIVE | Noted: 2022-01-01

## 2022-02-08 NOTE — H&P
HISTORY AND PHYSICAL   [facility]        Date of Admission: 2022  Patient Identification:  Name: Cam Gonsalves  Age: 76 y.o.  Sex: male  :  1945  MRN: [id number]                     Primary Care Physician: Enrique Carver MD    Chief Complaint:  76 year old gentleman who was brought in from a nursing home; he has a history of cva and aphasia and has a feeding tube which became dislodged; ER was unable to replace it but was able to place a small caliber catheter to keep the stoma open; the patient is nonverbal and not able to contribute to the history    History of Present Illness:   As above    Past Medical History:  Past Medical History:   Diagnosis Date   • Anxiety    • Aphasia    • BPH (benign prostatic hyperplasia)    • Cerebral infarction (HCC)    • Cervicalgia    • Chronic viral hepatitis C (HCC)    • COPD (chronic obstructive pulmonary disease) (HCC)    • Coronary artery disease    • Diabetes mellitus (HCC)    • Dysphagia, oropharyngeal phase    • Enterocolitis    • Gonzalez catheter in place    • G tube feedings (HCC)     JEVITY 1.2 AT 60 CC/HR   • Gastrointestinal hemorrhage    • Gastrostomy present (HCC)    • Gastrostomy tube in place (HCC)    • Hemiplegia and hemiparesis following cerebral infarction affecting right dominant side (HCC)    • History of COVID-19     2020   • History of MRSA infection    • History of sepsis    • Hydronephrosis    • Hyperlipidemia    • Hypertension    • Kidney failure, acute (HCC)    • Kidney stone    • Myocardial infarction (HCC)    • Other seizures (HCC)    • Pneumonitis    • Repeated falls    • Rhabdomyolysis    • Seizure (HCC)    • Stroke (HCC)    • Unspecified severe protein-calorie malnutrition (HCC)      Past Surgical History:  Past Surgical History:   Procedure Laterality Date   • CYSTOSCOPY W/ URETERAL STENT PLACEMENT Left 2021    Procedure: CYSTOSCOPY left retrograde pyelogram, left URETERAL STENT INSERTION;  Surgeon: Araceli  Rick Damon Jr., MD;  Location: [facility] MAIN OR;  Service: Urology;  Laterality: Left;   • ENDOSCOPY W/ PEG TUBE PLACEMENT N/A 5/31/2021    Procedure: ESOPHAGOGASTRODUODENOSCOPY WITH PERCUTANEOUS ENDOSCOPIC GASTROSTOMY TUBE INSERTION;  Surgeon: Martell Ramirez Jr., MD;  Location: [facility] MAIN OR;  Service: General;  Laterality: N/A;   • GTUBE INSERTION     • URETEROSCOPY LASER LITHOTRIPSY WITH STENT INSERTION Left 3/25/2021    Procedure: CYSTOSCOPY URETEROSCOPY LASER LITHOTRIPSY STONE BASKET EXTRACION STENT EXCHANGE;  Surgeon: Rick Charles Jr., MD;  Location: [facility] MAIN OR;  Service: Urology;  Laterality: Left;      Home Meds:  Medications Prior to Admission   Medication Sig Dispense Refill Last Dose   • busPIRone (BUSPAR) 5 MG tablet Administer 5 mg per G tube 2 (Two) Times a Day.      • Cholecalciferol (Vitamin D3) liquid 1,000 Units by Per G Tube route Daily.      • clonazePAM (KlonoPIN) 0.25 MG disintegrating tablet Administer 1 tablet per G tube Every Night. 3 tablet 0    • Divalproex Sodium (DEPAKOTE SPRINKLE) 125 MG capsule Take 250 mg by mouth Every Night.      • Divalproex Sodium (DEPAKOTE SPRINKLE) 125 MG capsule Take 125 mg by mouth Every Morning.      • glipizide (GLUCOTROL) 5 MG tablet Administer 2.5 mg per G tube Daily.      • HYDROcodone-acetaminophen (NORCO) 5-325 MG per tablet Administer 1 tablet per G tube 2 (two) times a day. 6 tablet 0    • HYDROcodone-acetaminophen (NORCO) 5-325 MG per tablet Administer 1 tablet per G tube Daily As Needed for Moderate Pain .      • ipratropium-albuterol (DUO-NEB) 0.5-2.5 mg/3 ml nebulizer Take 3 mL by nebulization Every 4 (Four) Hours As Needed for Wheezing or Shortness of Air.      • melatonin 3 MG tablet Administer 3 mg per G tube Every Night.      • rosuvastatin (CRESTOR) 10 MG tablet Administer 10 mg per G tube Daily.      • insulin regular (humuLIN R,novoLIN R) 100 UNIT/ML injection Inject 0-10 Units under the skin into the appropriate area as  "directed 3 (Three) Times a Day Before Meals. If -200 = 2 units, 201-250 = 4 units, 251-300 = 6 units, 301-350 = 8 units, 351-400 = 10 units, if greater than 400 or less than 60 notify MD      • lactobacillus acidophilus (RISAQUAD) capsule capsule 1 capsule by Per G Tube route Daily.      • lansoprazole 3 mg/mL in sodium bicarbonate injection 8.4% 30 mg by Per G Tube route Daily.      • POLYETHYLENE GLYCOL 3350 PO Administer 17 g per G tube Daily.          Allergies:  No Known Allergies  Immunizations:  Immunization History   Administered Date(s) Administered   • COVID-19 (PFIZER) PURPLE CAP 2021, 2021   • FluLaval/Fluarix/Fluzone >6 10/10/2021     Social History:   Social History     Social History Narrative   • Not on file     Social History     Socioeconomic History   • Marital status:    Tobacco Use   • Smoking status: Former Smoker   • Smokeless tobacco: Never Used   Substance and Sexual Activity   • Alcohol use: Not Currently   • Drug use: Not Currently     Comment: unknown - per guardian he did use \"about everything\"   • Sexual activity: Not Currently       Family History:  History reviewed. No pertinent family history.     Review of Systems  Not obtainable  Objective:  T Max 24 hrs: Temp (24hrs), Av.3 °F (36.8 °C), Min:98.1 °F (36.7 °C), Max:98.4 °F (36.9 °C)    Vitals Ranges:   Temp:  [98.1 °F (36.7 °C)-98.4 °F (36.9 °C)] 98.4 °F (36.9 °C)  Heart Rate:  [93-94] 93  Resp:  [14-16] 16  BP: (121-138)/(62-72) 121/62      Exam:  /62 (BP Location: Left arm, Patient Position: Lying)   Pulse 93   Temp 98.4 °F (36.9 °C) (Oral)   Resp 16   Ht 172.7 cm (68\")   Wt 57.2 kg (126 lb)   SpO2 93%   BMI 19.16 kg/m²     General Appearance:    Alert, cooperative, no distress, appears stated age; chronically ill appearing, thin, frail   Head:    Normocephalic, without obvious abnormality, atraumatic   Eyes:    PERRL, conjunctivae/corneas clear, EOM's intact, both eyes   Ears:    Normal " external ear canals, both ears   Nose:   Nares normal, septum midline, mucosa normal, no drainage    or sinus tenderness   Throat:   Lips, mucosa, and tongue normal   Neck:   Supple, symmetrical, trachea midline, no adenopathy;     thyroid:  no enlargement/tenderness/nodules; no carotid    bruit or JVD   Back:     Symmetric, no curvature, ROM normal, no CVA tenderness   Lungs:     Decreased breath sounds bilaterally, respirations unlabored   Chest Wall:    No tenderness or deformity    Heart:    Regular rate and rhythm, S1 and S2 normal, no murmur, rub   or gallop   Abdomen:     Soft, nontender, bowel sounds active all four quadrants,     no masses, no hepatomegaly, no splenomegaly   Extremities:   Extremities normal, atraumatic, no cyanosis or edema   Pulses:   2+ and symmetric all extremities   Skin:   Skin color, texture, turgor normal, no rashes or lesions   Lymph nodes:   Cervical, supraclavicular, and axillary nodes normal   Neurologic:   CNII-XII intact, normal strength, sensation intact throughout      .    Data Review:  Labs in chart were reviewed.  No results found for: WBC, HGB, HCT, PLT  Sodium   Date Value Ref Range Status   02/08/2022 143 136 - 145 mmol/L Final     Potassium   Date Value Ref Range Status   02/08/2022 4.3 3.5 - 5.2 mmol/L Final     Comment:     Slight hemolysis detected by analyzer. Results may be affected.     Chloride   Date Value Ref Range Status   02/08/2022 104 98 - 107 mmol/L Final     CO2   Date Value Ref Range Status   02/08/2022 26.0 22.0 - 29.0 mmol/L Final     BUN   Date Value Ref Range Status   02/08/2022 20 8 - 23 mg/dL Final     Creatinine   Date Value Ref Range Status   02/08/2022 0.84 0.76 - 1.27 mg/dL Final     Glucose   Date Value Ref Range Status   02/08/2022 119 (H) 65 - 99 mg/dL Final     Calcium   Date Value Ref Range Status   02/08/2022 10.0 8.6 - 10.5 mg/dL Final     AST (SGOT)   Date Value Ref Range Status   02/08/2022 28 1 - 40 U/L Final     ALT (SGPT)   Date  Value Ref Range Status   02/08/2022 24 1 - 41 U/L Final     Alkaline Phosphatase   Date Value Ref Range Status   02/08/2022 61 39 - 117 U/L Final                Imaging Results (All)     None        Patient Active Problem List   Diagnosis Code   • Gastrointestinal hemorrhage K92.2   • Clostridium difficile enterocolitis A04.72   • Dysphagia due to old stroke I69.391   • Diabetes mellitus ([facility]) E11.9   • Long term current use of anticoagulant therapy Z79.01   • Unspecified severe protein-calorie malnutrition (HCC) E43   • Sepsis ([facility]) A41.9   • GERD (gastroesophageal reflux disease) K21.9   • Hemiplegia and hemiparesis following cerebral infarction affecting right dominant side ([facility]) I69.351   • Abnormal urinalysis R82.90   • Type 2 diabetes mellitus with hyperglycemia ([facility]) E11.65   • 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   • Hypertension I10   • Hyperlipidemia E78.5   • On tube feeding diet Z78.9   • Gastrostomy tube in place ([facility]) Z93.1   • E coli bacteremia R78.81, B96.20   • Left ureteral stone N20.1   • Vascular dementia ([facility]) F01.50   • History of dysphagia Z87.19   • Renal infarction ([facility]) N28.0   • UTI (urinary tract infection) N39.0   • Dysphagia, oropharyngeal phase R13.12   • Chronic viral hepatitis C ([facility]) B18.2   • COPD (chronic obstructive pulmonary disease) ([facility]) J44.9   • Vomiting R11.10   • Dislodged gastrostomy tube T85.528A       Assessment:  Active Hospital Problems    Diagnosis  POA   • Dislodged gastrostomy tube [T85.528A]  Yes      Resolved Hospital Problems   No resolved problems to display.   diabetes  H.o cva with aphasia and right hemiplegia  Hypertension  Cad  copd    Plan:  IR to replace his tube tomorrow  Hold feedings tonight  Continue fluids  Check labs in am  D/w ED provider, nurse and patient    Alcira Harris MD  2/8/2022  18:45 EST

## 2022-02-08 NOTE — ED TRIAGE NOTES
gtube came out a few hours ago.  It's a 16 fr per nh.  He is non verbal    Patient was placed in face mask during first look triage.  Patient was wearing a face mask throughout encounter.  I wore personal protective equipment throughout the encounter.  Hand hygiene was performed before and after patient encounter.

## 2022-02-08 NOTE — ED PROVIDER NOTES
" EMERGENCY DEPARTMENT ENCOUNTER    Room Number:  P496/1  Date seen:  2/8/2022  Time seen: 15:09 EST  PCP: Enrique Carver MD  Historian: EMS report      HPI:  Chief Complaint: gtube dislodged    A complete HPI/ROS/PMH/PSH/SH/FH are unobtainable due to: nonverbal    Context: Cam Gonsalves is a 76 y.o. male who presents to the ED for evaluation of large G-tube.  Arrives via EMS from the nursing home, history given by EMS report.  G-tube noted to have been out for \"a few hours.\" Reportedly has a 16 [place] gtube at baseline.        PAST MEDICAL HISTORY  Active Ambulatory Problems     Diagnosis Date Noted   • Gastrointestinal hemorrhage 01/02/2020   • Clostridium difficile enterocolitis 01/02/2020   • Dysphagia due to old stroke 01/02/2020   • Diabetes mellitus ([facility]) 01/02/2020   • Long term current use of anticoagulant therapy 01/02/2020   • Unspecified severe protein-calorie malnutrition ([facility]) 01/03/2020   • Sepsis ([facility]) 02/24/2021   • GERD (gastroesophageal reflux disease) 02/24/2021   • Hemiplegia and hemiparesis following cerebral infarction affecting right dominant side ([facility]) 02/24/2021   • Abnormal urinalysis 02/24/2021   • Type 2 diabetes mellitus with hyperglycemia ([facility]) 02/24/2021   • Deviation of right eye 02/24/2021   • Hypertension    • Hyperlipidemia    • On tube feeding diet    • Gastrostomy tube in place ([facility])    • E coli bacteremia 02/25/2021   • Left ureteral stone 02/25/2021   • Vascular dementia ([facility]) 05/29/2021   • History of dysphagia 05/29/2021   • Renal infarction ([facility]) 10/09/2021   • UTI (urinary tract infection) 10/09/2021   • Dysphagia, oropharyngeal phase    • Chronic viral hepatitis C ([facility])    • COPD (chronic obstructive pulmonary disease) ([facility])    • Vomiting      Resolved Ambulatory Problems     Diagnosis Date Noted   • Metabolic encephalopathy 02/24/2021   • Pulmonary venous congestion 02/24/2021   • Dislodged gastrostomy tube 05/29/2021     Past Medical History:   Diagnosis Date " "  • Anxiety    • Aphasia    • BPH (benign prostatic hyperplasia)    • Cerebral infarction (HCC)    • Cervicalgia    • Coronary artery disease    • Enterocolitis    • Gonzalez catheter in place    • G tube feedings (HCC)    • Gastrostomy present (HCC)    • History of COVID-19    • History of MRSA infection    • History of sepsis    • Hydronephrosis    • Kidney failure, acute (HCC)    • Kidney stone    • Myocardial infarction (HCC)    • Other seizures (HCC)    • Pneumonitis    • Repeated falls    • Rhabdomyolysis    • Seizure (HCC)    • Stroke (HCC)          PAST SURGICAL HISTORY  Past Surgical History:   Procedure Laterality Date   • CYSTOSCOPY W/ URETERAL STENT PLACEMENT Left 2/25/2021    Procedure: CYSTOSCOPY left retrograde pyelogram, left URETERAL STENT INSERTION;  Surgeon: Rick Charles Jr., MD;  Location: [facility];  Service: Urology;  Laterality: Left;   • ENDOSCOPY W/ PEG TUBE PLACEMENT N/A 5/31/2021    Procedure: ESOPHAGOGASTRODUODENOSCOPY WITH PERCUTANEOUS ENDOSCOPIC GASTROSTOMY TUBE INSERTION;  Surgeon: Martell Ramirez Jr., MD;  Location: [facility];  Service: General;  Laterality: N/A;   • GTUBE INSERTION     • URETEROSCOPY LASER LITHOTRIPSY WITH STENT INSERTION Left 3/25/2021    Procedure: CYSTOSCOPY URETEROSCOPY LASER LITHOTRIPSY STONE BASKET EXTRACION STENT EXCHANGE;  Surgeon: Rick Charles Jr., MD;  Location: [facility];  Service: Urology;  Laterality: Left;         FAMILY HISTORY  History reviewed. No pertinent family history.      SOCIAL HISTORY  Social History     Socioeconomic History   • Marital status:    Tobacco Use   • Smoking status: Former Smoker   • Smokeless tobacco: Never Used   Substance and Sexual Activity   • Alcohol use: Not Currently   • Drug use: Not Currently     Comment: unknown - per guardian he did use \"about everything\"   • Sexual activity: Not Currently         ALLERGIES  Patient has no known allergies.        REVIEW OF SYSTEMS  Review of " Systems   Unable to perform ROS: Patient nonverbal          PHYSICAL EXAM  ED Triage Vitals [02/08/22 1436]   Temp Heart Rate Resp BP SpO2   98.1 °F (36.7 °C) 94 14 138/72 98 %      Temp src Heart Rate Source Patient Position BP Location FiO2 (%)   Oral Monitor -- -- --         GENERAL: not distressed  HENT: atraumatic  EYES: no scleral icterus  CV: regular rhythm, regular rate  RESPIRATORY: normal effort, CTA B  ABDOMEN: soft, nondistended, nontender.  G-tube insertion site in the left upper abdomen appears clean and dry with some granulation tissue at the insertion bed.  MUSCULOSKELETAL: no deformity  NEURO: alert, nonverbal  SKIN: warm, dry    Vital signs and nursing notes reviewed.          LAB RESULTS  Recent Results (from the past 24 hour(s))   Comprehensive Metabolic Panel    Collection Time: 02/08/22  4:40 PM    Specimen: Blood   Result Value Ref Range    Glucose 119 (H) 65 - 99 mg/dL    BUN 20 8 - 23 mg/dL    Creatinine 0.84 0.76 - 1.27 mg/dL    Sodium 143 136 - 145 mmol/L    Potassium 4.3 3.5 - 5.2 mmol/L    Chloride 104 98 - 107 mmol/L    CO2 26.0 22.0 - 29.0 mmol/L    Calcium 10.0 8.6 - 10.5 mg/dL    Total Protein 7.5 6.0 - 8.5 g/dL    Albumin 4.30 3.50 - 5.20 g/dL    ALT (SGPT) 24 1 - 41 U/L    AST (SGOT) 28 1 - 40 U/L    Alkaline Phosphatase 61 39 - 117 U/L    Total Bilirubin 0.3 0.0 - 1.2 mg/dL    eGFR  African Amer 107 >60 mL/min/1.73    Globulin 3.2 gm/dL    A/G Ratio 1.3 g/dL    BUN/Creatinine Ratio 23.8 7.0 - 25.0    Anion Gap 13.0 5.0 - 15.0 mmol/L   COVID-19,[facility] IN-HOUSE CEPHEID/VIPIN NP SWAB IN TRANSPORT MEDIA 8-12 HR TAT - Swab, Nasopharynx    Collection Time: 02/08/22  4:41 PM    Specimen: Nasopharynx; Swab   Result Value Ref Range    COVID19 Not Detected Not Detected - Ref. Range   CBC Auto Differential    Collection Time: 02/08/22  6:30 PM    Specimen: Blood   Result Value Ref Range    WBC 9.41 3.40 - 10.80 10*3/mm3    RBC 5.29 4.14 - 5.80 10*6/mm3    Hemoglobin 14.0 13.0 - 17.7 g/dL     Hematocrit 41.9 37.5 - 51.0 %    MCV 79.2 79.0 - 97.0 fL    MCH 26.5 (L) 26.6 - 33.0 pg    MCHC 33.4 31.5 - 35.7 g/dL    RDW 13.1 12.3 - 15.4 %    RDW-SD 37.3 37.0 - 54.0 fl    MPV 11.8 6.0 - 12.0 fL    Platelets 241 140 - 450 10*3/mm3    Neutrophil % 74.8 42.7 - 76.0 %    Lymphocyte % 15.7 (L) 19.6 - 45.3 %    Monocyte % 8.7 5.0 - 12.0 %    Eosinophil % 0.2 (L) 0.3 - 6.2 %    Basophil % 0.3 0.0 - 1.5 %    Immature Grans % 0.3 0.0 - 0.5 %    Neutrophils, Absolute 7.03 (H) 1.70 - 7.00 10*3/mm3    Lymphocytes, Absolute 1.48 0.70 - 3.10 10*3/mm3    Monocytes, Absolute 0.82 0.10 - 0.90 10*3/mm3    Eosinophils, Absolute 0.02 0.00 - 0.40 10*3/mm3    Basophils, Absolute 0.03 0.00 - 0.20 10*3/mm3    Immature Grans, Absolute 0.03 0.00 - 0.05 10*3/mm3    nRBC 0.0 0.0 - 0.2 /100 WBC       Ordered the above labs and independently reviewed the results.        RADIOLOGY  IR Gastrostomy Tube    (Results Pending)         PROCEDURES  Procedures        MEDICATIONS GIVEN IN ER  Medications   dextrose 5 % and sodium chloride 0.45 % with KCl 20 mEq/L infusion (125 mL/hr Intravenous Currently Infusing 2/8/22 2135)   acetaminophen (TYLENOL) tablet 650 mg (has no administration in time range)     Or   acetaminophen (TYLENOL) 160 MG/5ML solution 650 mg (has no administration in time range)     Or   acetaminophen (TYLENOL) suppository 650 mg (has no administration in time range)   ondansetron (ZOFRAN) injection 4 mg (has no administration in time range)   clonazePAM (KlonoPIN) tablet 0.25 mg (has no administration in time range)   Divalproex Sodium (DEPAKOTE SPRINKLE) capsule 250 mg (has no administration in time range)   Divalproex Sodium (DEPAKOTE SPRINKLE) capsule 125 mg (has no administration in time range)   ipratropium-albuterol (DUO-NEB) nebulizer solution 3 mL (has no administration in time range)   morphine injection 2 mg (has no administration in time range)   dextrose (GLUTOSE) oral gel 15 g (has no administration in time range)    dextrose (D50W) (25 g/50 mL) IV injection 25 g (has no administration in time range)   glucagon (human recombinant) (GLUCAGEN DIAGNOSTIC) injection 1 mg (has no administration in time range)   insulin lispro (ADMELOG) injection 0-7 Units (has no administration in time range)   sodium chloride 0.9 % bolus 500 mL (0 mL Intravenous Stopped 2/8/22 1745)             PROGRESS AND CONSULTS        ED Course as of 02/08/22 1958 Tue Feb 08, 2022   1511 Initial attempt at insertion of a 16 [place] G-tube unsuccessful.  Will attempt something smaller in caliber.  9 [place] pediatric catheter has been ordered from central supply. [KA]   1512 Medical chart reviewed.  ER visit on 10/30/2021 for dislodged G-tube.  It was replaced without complication with an 18 [place] [KA]   1540 Was able to insert a 9 [place] pediatric catheter into the G-tube site.  I called radiology and they will discuss with the radiologist to perform a radiology guided replacement. [KA]   1957 No radiologist available to perform guided replacement of the G-tube while patient was in the ER.  We will leave the pediatric catheter in place and patient admitted for replacement tomorrow.  Dr. Diaz discussed the patient with Dr. Harris who agreed to admit. [KA]      ED Course User Index  [KA] Kailey Coker PA             Patient was placed in face mask in first look. Patient was wearing facemask each time I entered the room and throughout our encounter. I wore protective equipment throughout this patient encounter including a face mask, eye shield and gloves. Hand hygiene was performed before donning protective equipment and after removal when leaving the room.        DIAGNOSIS  Final diagnoses:   Dislodged gastrostomy tube             Latest Documented Vital Signs:  As of 19:58 EST  BP- 121/62 HR- 93 Temp- 98.4 °F (36.9 °C) (Oral) O2 sat- 93%       Kailey Coker PA  02/08/22 1958

## 2022-02-08 NOTE — ED NOTES
"Nursing report ED to floor  Cam Gonsalves  76 y.o.  male    HPI :   Chief Complaint   Patient presents with   • gtube out       Admitting doctor:   Alcira Harris MD    Admitting diagnosis:   The encounter diagnosis was Dislodged gastrostomy tube.    Code status:   Current Code Status     Date Active Code Status Order ID Comments User Context       Prior    Advance Care Planning Activity          Allergies:   Patient has no known allergies.    Intake and Output  No intake or output data in the 24 hours ending 02/08/22 1644    Weight:       02/08/22  1450   Weight: 57.2 kg (126 lb)       Most recent vitals:   Vitals:    02/08/22 1436 02/08/22 1450   BP: 138/72    Pulse: 94    Resp: 14    Temp: 98.1 °F (36.7 °C)    TempSrc: Oral    SpO2: 98%    Weight:  57.2 kg (126 lb)   Height:  172.7 cm (68\")       Active LDAs/IV Access:   Lines, Drains & Airways     Active LDAs     Name Placement date Placement time Site Days    Peripheral IV 10/09/21 1105 Left Antecubital 10/09/21  1105  Antecubital  122    Peripheral IV 02/08/22 1641 Left Saphenous 02/08/22  1641  Saphenous  less than 1    Gastrostomy/Enterostomy Gastrostomy 1 LUQ --  --  LUQ      Gastrostomy/Enterostomy 05/31/21  2044  --  252    Ureteral Drain/Stent Left ureter 6 Fr. 02/25/21  1639  Left ureter  string off stent  348    Ureteral Drain/Stent Left ureter 6 Fr. 03/25/21  1518  Left ureter  320                Labs (abnormal labs have a star):   Labs Reviewed   COVID PRE-OP / PRE-PROCEDURE SCREENING ORDER (NO ISOLATION)    Narrative:     The following orders were created for panel order COVID PRE-OP / PRE-PROCEDURE SCREENING ORDER (NO ISOLATION) - Swab, Nasopharynx.  Procedure                               Abnormality         Status                     ---------                               -----------         ------                     COVID-[facility]HOU[facility]...[[id number]]                                                   Please view results for these " "tests on the individual orders.   COVID-19,[facility] IN-HOUSE CEPHEID/VIPIN, NP SWAB IN TRANSPORT MEDIA 8-12 HR TAT   COMPREHENSIVE METABOLIC PANEL   CBC WITH AUTO DIFFERENTIAL   CBC AND DIFFERENTIAL    Narrative:     The following orders were created for panel order CBC & Differential.  Procedure                               Abnormality         Status                     ---------                               -----------         ------                     CBC Auto Differential[[id number]]                                                         Please view results for these tests on the individual orders.       EKG:   No orders to display       Meds given in ED:   Medications   sodium chloride 0.9 % bolus 500 mL (has no administration in time range)   dextrose 5 % and sodium chloride 0.45 % with KCl 20 mEq/L infusion (has no administration in time range)       Imaging results:  No radiology results for the last day    Ambulatory status:   - BEDREST    Social issues:   Social History     Socioeconomic History   • Marital status:    Tobacco Use   • Smoking status: Former Smoker   • Smokeless tobacco: Never Used   Substance and Sexual Activity   • Alcohol use: Not Currently   • Drug use: Not Currently     Comment: unknown - per guardian he did use \"about everything\"   • Sexual activity: Not Currently       NIH Stroke Scale:        Nursing report ED to floor:     Fran Sepulveda RN  02/08/22 0224    "

## 2022-02-08 NOTE — ED PROVIDER NOTES
MD ATTESTATION NOTE    The JUSTUS and I have discussed this patient's history, physical exam, and treatment plan.  I have reviewed the documentation and personally had a face to face interaction with the patient. I affirm the documentation and agree with the treatment and plan.  The attached note describes my personal findings.      I provided a substantive portion of the care of the patient.  I personally performed the physical exam in its entirety, and below are my findings.  For this patient encounter, the patient wore surgical mask, I wore full protective PPE including N95 and eye protection.      Brief HPI: Patient unable to give any history.  Patient here because G-tube came out.  Reported has been out for a few hours.    PHYSICAL EXAM  ED Triage Vitals [02/08/22 1436]   Temp Heart Rate Resp BP SpO2   98.1 °F (36.7 °C) 94 14 138/72 98 %      Temp src Heart Rate Source Patient Position BP Location FiO2 (%)   Oral Monitor -- -- --         GENERAL: no acute distress  HENT: nares patent  EYES: no scleral icterus  CV: regular rhythm, normal rate  RESPIRATORY: normal effort  ABDOMEN: soft Stoma with minimal to no opening  MUSCULOSKELETAL: no deformity  NEURO: alert, moves all extremities,  PSYCH:  calm, cooperative  SKIN: warm, dry    Vital signs and nursing notes reviewed.        Plan: We were able to get a 9 [place] catheter in.  Will contact interventional radiology to see if they can replace this       Derek Diaz MD  02/08/22 0235

## 2022-02-08 NOTE — PLAN OF CARE
Goal Outcome Evaluation:         Patient arrived to unit with no belongings. He is non verbal other than some minor moans and groans. He does not appear to be in any pain but is visibly agitated when receiving care. He is contracted on the right side and has obvious mental deficiency. He is otherwise calm and cooperative. Plan is to reinsert G-tube via radiology tomorrow. Will continue to monitor.

## 2022-02-09 NOTE — DISCHARGE PLACEMENT REQUEST
"Noam Aldana (76 y.o. Male)             Date of Birth Social Security Number Address Home Phone MRN    1945  [id number] [facility] [address] [phone] [id number]    Scientologist Marital Status             None        Admission Date Admission Type Admitting Provider Attending Provider Department, Room/Bed    2/8/22 Emergency Stingl, MD Constantine Corona Patrick D, MD [address], P496/1    Discharge Date Discharge Disposition Discharge Destination           Skilled Nursing Facility (DC - External)              Attending Provider: Efraín Fitch MD    Allergies: No Known Allergies    Isolation: None   Infection: MRSA/History Only (02/24/21)   Code Status: No CPR   Advance Care Planning Activity    Ht: 172.7 cm (68\")   Wt: 57.2 kg (126 lb)    Admission Cmt: None   Principal Problem: None                Active Insurance as of 2/8/2022     Primary Coverage     Payor Plan Insurance Group Employer/Plan Group    ANTHEM MEDICARE REPLACEMENT ANTHEM MEDICARE ADVANTAGE [id number]     Payor Plan Address Payor Plan Phone Number Payor Plan Fax Number Effective Dates    [address] [phone]  3/1/2019 - None Entered    [facility] [address]       Subscriber Name Subscriber Birth Date Member ID       NOAM ALDANA 1945 [id number]           Secondary Coverage     Payor Plan Insurance Group Employer/Plan Group    [place]      Payor Plan Address Payor Plan Phone Number Payor Plan Fax Number Effective Dates    [address] [phone]  2/24/2021 - None Entered    [facility] [address]       Subscriber Name Subscriber Birth Date Member ID       NOAM ALDANA 1945 [id number]                 Emergency Contacts      (Rel.) Home Phone Work Phone Mobile Phone    NIXSHWETATARUN (niece) (Relative) [phone] -- --              "

## 2022-02-09 NOTE — PROGRESS NOTES
"Physicians Statement of Medical Necessity for  Ambulance Transportation    GENERAL INFORMATION     Name: Cam Gonsalves  YOB: 1945  Medicare #: ANTHEM MEDICARE REPLACEMENT #[id number] AND [place] MEDICAID #[id number]  Transport Date: 2/9/22 @ 7:30 PM (Valid for round trips this date, or for scheduled repetitive trips for 60 days from the date signed below.)  Origin: [address] Destination: [place] [address]  Is the Patient's stay covered under Medicare Part A (PPS/DRG?)Yes  Closest appropriate facility? Yes  If this a hosp-hosp transfer? No  Is this a hospice patient? No    MEDICAL NECESSITY QUESTIONAIRE    Ambulance Transportation is medically necessary only if other means of transportation are contraindicated or would be potentially harmful to the patient.  To meet this requirement, the patient must be either \"bed confined\" or suffer from a condition such that transport by means other than an ambulance is contraindicated by the patient's condition.  The following questions must be answered by the healthcare professional signing below for this form to be valid:     1) Describe the MEDICAL CONDITION (physical and/or mental) of this patient AT THE TIME OF AMBULANCE TRANSPORT that requires the patient to be transported in an ambulance, and why transport by other means is contraindicated by the patient's condition: stable  Past Medical History:   Diagnosis Date   • Anxiety    • Aphasia    • BPH (benign prostatic hyperplasia)    • Cerebral infarction (HCC)    • Cervicalgia    • Chronic viral hepatitis C (HCC)    • COPD (chronic obstructive pulmonary disease) (HCC)    • Coronary artery disease    • Diabetes mellitus (HCC)    • Dysphagia, oropharyngeal phase    • Enterocolitis    • Gonzalez catheter in place    • G tube feedings (HCC)     JEVITY 1.2 AT 60 CC/HR   • Gastrointestinal hemorrhage    • Gastrostomy present (HCC)  " "  • Gastrostomy tube in place (HCC)    • Hemiplegia and hemiparesis following cerebral infarction affecting right dominant side (HCC)    • History of COVID-19     11/2020   • History of MRSA infection    • History of sepsis    • Hydronephrosis    • Hyperlipidemia    • Hypertension    • Kidney failure, acute (HCC)    • Kidney stone    • Myocardial infarction (HCC)    • Other seizures (HCC)    • Pneumonitis    • Repeated falls    • Rhabdomyolysis    • Seizure (HCC)    • Stroke (HCC)    • Unspecified severe protein-calorie malnutrition (HCC)       Past Surgical History:   Procedure Laterality Date   • CYSTOSCOPY W/ URETERAL STENT PLACEMENT Left 2/25/2021    Procedure: CYSTOSCOPY left retrograde pyelogram, left URETERAL STENT INSERTION;  Surgeon: Rick Charles Jr., MD;  Location: [facility];  Service: Urology;  Laterality: Left;   • ENDOSCOPY W/ PEG TUBE PLACEMENT N/A 5/31/2021    Procedure: ESOPHAGOGASTRODUODENOSCOPY WITH PERCUTANEOUS ENDOSCOPIC GASTROSTOMY TUBE INSERTION;  Surgeon: Martell Ramirez Jr., MD;  Location: [facility];  Service: General;  Laterality: N/A;   • GTUBE INSERTION     • URETEROSCOPY LASER LITHOTRIPSY WITH STENT INSERTION Left 3/25/2021    Procedure: CYSTOSCOPY URETEROSCOPY LASER LITHOTRIPSY STONE BASKET EXTRACION STENT EXCHANGE;  Surgeon: Rick Charles Jr., MD;  Location: [facility];  Service: Urology;  Laterality: Left;      2) Is this patient \"bed confined\" as defined below?Yes   To be \"bed confined\" the patient must satisfy all three of the following criteria:  (1) unable to get up from bed without assistance; AND (2) unable to ambulate;  AND (3) unable to sit in a chair or wheelchair.  3) Can this patient safely be transported by car or wheelchair van (I.e., may safely sit during transport, without an attendant or monitoring?)No   4. In addition to completing questions 1-3 above, please check any of the following conditions that apply*:          *Note: supporting " documentation for any boxes checked must be maintained in the patient's medical records Unable to tolerate seated position for time needed to transport      SIGNATURE OF PHYSICIAN OR OTHER AUTHORIZED HEALTHCARE PROFESSIONAL    I certify that the above information is true and correct based on my evaluation of this patient, and represent that the patient requires transport by ambulance and that other forms of transport are contraindicated.  I understand that this information will be used by the Centers for Medicare and Medicaid Services (CMS) to support the determiniation of medical necessity for ambulance services, and I represent that I have personal knowledge of the patient's condition at the time of transport.     X   If this box is checked, I also certify that the patient is physically or mentally incapable of signing the ambulance service's claim form and that the institution with which I am affiliated has furnished care, services or assistance to the patient.  My signature below is made on behalf of the patient pursuant to 42 .36(b)(4). In accordance with 42 .37, the specific reason(s) that the patient is physically or mentally incapable of signing the claim for is as follows:    Signature of Physician or Healthcare Professional  Date/Time: 2/9/22 at 7:30 PM     (For Scheduled repetitive transport, this form is not valid for transports performed more than 60 days after this date).                                                                                                                                            --------------------------------------------------------------------------------------------  Printed Name and Credentials of Physician or Authorized Healthcare Professional     Form must be signed by patient's attending physician for scheduled, repetitive transports,.  For non-repetitive ambulance transports, if unable to obtain the signature of the attending physician, any of the  following may sign (please select below):     Physician  Clinical Nurse Specialist  X Registered Nurse     Physician Assistant  Discharge Planner  Licensed Practical Nurse     Nurse Practitioner

## 2022-02-09 NOTE — PROGRESS NOTES
Discharge Planning Assessment  [facility]     Patient Name: Cam Gonsalves  MRN: [id number]  Today's Date: 2/9/2022    Admit Date: 2/8/2022     Discharge Needs Assessment    No documentation.                Discharge Plan     Row Name 02/09/22 1533       Plan    Plan Comments [facility] had a time open up and they can transport today at 7:30 PM. OZZY Rivera RN, CCP.              Continued Care and Services - Admitted Since 2/8/2022     Destination     Service Provider Request Status Selected Services Address Phone Fax Patient Preferred    [facility]  Pending - Request Sent N/A [address] [facility] [address] [phone] --              Expected Discharge Date and Time     Expected Discharge Date Expected Discharge Time    Feb 9, 2022          Demographic Summary    No documentation.                Functional Status    No documentation.                Psychosocial    No documentation.                Abuse/Neglect    No documentation.                Legal    No documentation.                Substance Abuse    No documentation.                Patient Forms    No documentation.                   Rea Rivera RN

## 2022-02-09 NOTE — DISCHARGE SUMMARY
Date of Admission: 2/8/2022  Date of Discharge:  2/9/2022  Primary Care Physician: Enrique Carver MD     Discharge Diagnosis:  Active Hospital Problems    Diagnosis  POA   • Dislodged gastrostomy tube [T85.528A]  Yes      Resolved Hospital Problems   No resolved problems to display.       Presenting Problem/History of Present Illness:  Dislodged gastrostomy tube [T85.528A]     76 year old gentleman who was brought in from a nursing home; he has a history of cva and aphasia and has a feeding tube which became dislodged; ER was unable to replace it but was able to place a small caliber catheter to keep the stoma open; the patient is nonverbal and not able to contribute to the history    Hospital Course:  The patient is a 76 y.o. male who presented with dysphagia and aphasia from prior stroke was admitted with feeding tube dislodgment.  Was unable to be replaced in the emergency room.  It was replaced by interventional radiology today.  He is going to have resumption of tube feeds and if he tolerates them can be discharged back to his facility.    Exam Today:  General AA NAD, chronically ill-appearing  HEENT NCAT  CV RRR  Lung decreased breath sounds but no wheezes  Abdomen ND NT  Extremity no cyanosis or edema  Neuro at baseline  Psych impaired testing due to his chronic neurologic deficits    Results:  IR PEG Replacement  Successful gastrostomy tube replacement under fluoroscopic guidance    Procedures Performed:         Consults:   Consults     Date and Time Order Name Status Description    2/8/2022  3:59 PM [facility] (on-call MD unless specified) Details             Discharge Disposition:  Skilled Nursing Facility (DC - External)    Discharge Medications:     Discharge Medications      Changes to Medications      Instructions Start Date   clonazePAM 0.25 MG disintegrating tablet  Commonly known as: KlonoPIN  What changed:   · when to take this  · reasons to take this   0.25 mg, Per G Tube, Nightly PRN          Continue These Medications      Instructions Start Date   busPIRone 5 MG tablet  Commonly known as: BUSPAR   5 mg, Per G Tube, 2 Times Daily      Divalproex Sodium 125 MG capsule  Commonly known as: DEPAKOTE SPRINKLE   250 mg, Oral, Nightly      Divalproex Sodium 125 MG capsule  Commonly known as: DEPAKOTE SPRINKLE   125 mg, Oral, Every Morning      glipizide 5 MG tablet  Commonly known as: GLUCOTROL   2.5 mg, Per G Tube, Daily      HYDROcodone-acetaminophen 5-325 MG per tablet  Commonly known as: NORCO   1 tablet, Per G Tube, Daily PRN      HYDROcodone-acetaminophen 5-325 MG per tablet  Commonly known as: NORCO   1 tablet, Per G Tube, 2 times daily      insulin regular 100 UNIT/ML injection  Commonly known as: humuLIN R,novoLIN R   0-10 Units, Subcutaneous, 3 Times Daily Before Meals, If -200 = 2 units, 201-250 = 4 units, 251-300 = 6 units, 301-350 = 8 units, 351-400 = 10 units, if greater than 400 or less than 60 notify MD       ipratropium-albuterol 0.5-2.5 mg/3 ml nebulizer  Commonly known as: DUO-NEB   3 mL, Nebulization, Every 4 Hours PRN      lactobacillus acidophilus capsule capsule   1 capsule, Per G Tube, Daily      lansoprazole 3 mg/mL in sodium bicarbonate injection 8.4%   30 mg, Per G Tube, Daily      melatonin 3 MG tablet   3 mg, Per G Tube, Nightly      POLYETHYLENE GLYCOL 3350 PO   17 g, Per G Tube, Daily      rosuvastatin 10 MG tablet  Commonly known as: CRESTOR   10 mg, Per G Tube, Daily      Vitamin D3 liquid   1,000 Units, Per G Tube, Daily             Discharge Diet:   Diet Instructions     Diet: Tube Feeding; Continuous; Resume prior tube feeds      Discharge Diet: Tube Feeding    Feeding Type: Continuous    Formula & Rate: Resume prior tube feeds          Activity at Discharge:   Activity Instructions     Activity as Tolerated            Follow-up Appointments:   Follow-up Information     Enrique Carver MD Follow up.    Specialty: Family Medicine  Contact information:  [address] [facility] [address]  [phone]                         Test Results Pending at Discharge:       Efraín Fitch MD  02/09/22  11:47 EST    Time Spent on Discharge Activities: >30 minutes    Dictated portions using Dragon dictation software.  During the entire encounter, I was wearing recommended PPE including face mask and eye protection. Hand sanitization was performed prior to entering room and upon exit.

## 2022-02-09 NOTE — CONSULTS
"Adult Nutrition  Assessment/PES    Patient Name:  Cam Gonsalves  YOB: 1945  MRN: [id number]  Admit Date:  2/8/2022    Assessment Date:  2/9/2022    Comments:  Consult for TF assessment    Pt's G-tube replaced and OK to restart feedings. Historically on Diabetisource AC at goal rate of 55 cc/hr. Will restart at 25 cc/hr, can advance to goal as tolerated today (pt may be discharged back to NH before goal rate achieved). Water flush 25 cc/hr.     D/w RN - bag of enteral nutrition formula delivered. Will monitor.      Reason for Assessment     Row Name 02/09/22 1133          Reason for Assessment    Reason For Assessment physician consult; diagnosis/disease state  Dislodged gastrostomy tube     Diagnosis neurologic conditions; diabetes diagnosis/complications; gastrointestinal disease; liver disease     Identified At Risk by Screening Criteria tube feeding or parenteral nutrition; MST SCORE 2+                Nutrition/Diet History     Row Name 02/09/22 1138          Nutrition/Diet History    Typical Food/Fluid Intake NPO, tube feeds - diabetic formula, previously on 55 cc/hr when here but may do bolus feeds at NH.                Anthropometrics     Row Name 02/09/22 1138          Anthropometrics    Height 172.7 cm (68\")            Admit Weight    Admit Weight Method estimated     Admit Weight 57.2 kg (126 lb)            Ideal Body Weight (IBW)    Ideal Body Weight (IBW) (kg) 70.89            Usual Body Weight (UBW)    Usual Body Weight 57.2 kg (126 lb)  10/2021            Body Mass Index (BMI)    BMI Assessment BMI 18.5-24.9: normal  19                Labs/Tests/Procedures/Meds     Row Name 02/09/22 1143          Labs/Procedures/Meds    Lab Results Reviewed reviewed, pertinent     Lab Results Comments wnl            Diagnostic Tests/Procedures    Diagnostic Test/Procedure Reviewed reviewed, pertinent     Diagnostic Test/Procedures Comments G-tube replaced            Medications    Pertinent Medications " "Reviewed reviewed, pertinent     Pertinent Medications Comments insulin, depakote, klonopin                Physical Findings     Row Name 02/09/22 1143          Physical Findings    Gastrointestinal feeding tube     Tubes gastrostomy tube     Skin other (see comments)  intact                Estimated/Assessed Needs     Row Name 02/09/22 1138          Calculation Measurements    Weight Used For Calculations 57.2 kg (126 lb)     Height 172.7 cm (68\")            Estimated/Assessed Needs    Additional Documentation Protein Requirements (Group); Fluid Requirements (Group); KCAL/KG (Group)            KCAL/KG    KCAL/KG 25 Kcal/Kg (kcal)     25 Kcal/Kg (kcal) 1428.825            Protein Requirements    Weight Used For Protein Calculations 57.2 kg (126 lb)     Est Protein Requirement Amount (gms/kg) 1.2 gm protein     Estimated Protein Requirements (gms/day) 68.58            Fluid Requirements    Fluid Requirements (mL/day) 1500     Estimated Fluid Requirement Method RDA Method     RDA Method (mL) 1500                Nutrition Prescription Ordered     Row Name 02/09/22 1145          Nutrition Prescription PO    Current PO Diet NPO            Nutrition Prescription EN    Enteral Route Gastrostomy                       Problem/Interventions:   Problem 1     Row Name 02/09/22 1145          Nutrition Diagnoses Problem 1    Problem 1 Needs Alternate Route     Etiology (related to) Functional Diagnosis     Functional Diagnosis Cognitive deficit; Dysphagia     Signs/Symptoms (evidenced by) NPO                      Intervention Goal     Row Name 02/09/22 1145          Intervention Goal    General Maintain nutrition; Disease management/therapy; Nutrition support treatment     TF/PN Inititiate TF/PN                Nutrition Intervention     Row Name 02/09/22 1145          Nutrition Intervention    RD/Tech Action Recommend/ordered; Follow Tx progress     Recommended/Ordered EN                Nutrition Prescription     Row Name " 02/09/22 1145          Nutrition Prescription EN    Enteral Prescription Enteral begin/change; Enteral to supply     Enteral Route Gastrostomy     Product Diabetisource     TF Delivery Method Continuous     Continuous TF Goal Rate (mL/hr) 55 mL/hr     Continuous TF Starting Rate (mL/hr) 25 mL/hr     Water flush (mL)  25 mL     Water Flush Frequency Per hour     New EN Prescription Ordered? Yes            EN to Supply    Kcal/Day 1584 Kcal/Day     Protein (gm/day) 79 gm/day     Meet Estimated Kcal Need (%) 110 %     Meet Estimated Protein Need (%) 116 %     TF Free H2O (mL) 1082 mL     Total Free H2O (mL/day) 1682 mL/day                Education/Evaluation     Row Name 02/09/22 1147          Education    Education Education not appropriate at this time            Monitor/Evaluation    Monitor Per protocol; Pertinent labs; TF delivery/tolerance; Weight; Skin status; Symptoms                 Electronically signed by:  Genesis Patten RD  02/09/22 11:48 EST

## 2022-02-09 NOTE — PROGRESS NOTES
Case Management Discharge Note      Final Note: Discharged to [facility] to  medicaid bed by [facility]. OZZY Rivera RN, CCP.         Selected Continued Care - Admitted Since 2/8/2022     Destination Coordination complete.    Service Provider Selected Services Address Phone Fax Patient Preferred    [facility] [address] [facility] [id number] [phone] --          Durable Medical Equipment    No services have been selected for the patient.              Dialysis/Infusion    No services have been selected for the patient.              Home Medical Care    No services have been selected for the patient.              Therapy    No services have been selected for the patient.              Community Resources    No services have been selected for the patient.              Community & DME    No services have been selected for the patient.                  Transportation Services  Ambulance: [facility]    Final Discharge Disposition Code: 04 - intermediate care facility

## 2022-02-09 NOTE — PROGRESS NOTES
Name: Cam Gonsalves ADMIT: 2022   : 1945  PCP: Enrqiue Carver MD    MRN: [id number] LOS: 0 days   AGE/SEX: 76 y.o. male  ROOM: [facility]   Subjective   Chief Complaint   Patient presents with   • gtube out      Patient is nonverbal but alert and maintains gaze.  He could not answer review of systems questions or follow commands for me.    Objective   Vital Signs  Temp:  [98.1 °F (36.7 °C)-98.4 °F (36.9 °C)] 98.3 °F (36.8 °C)  Heart Rate:  [72-94] 72  Resp:  [14-16] 16  BP: (111-138)/(56-86) 135/71  SpO2:  [93 %-100 %] 100 %  on   ;   Device (Oxygen Therapy): room air  Body mass index is 19.16 kg/m².    Physical Exam  Vitals and nursing note reviewed.   Constitutional:       Appearance: He is ill-appearing (chronically). He is not diaphoretic.   HENT:      Head: Normocephalic and atraumatic.   Eyes:      General:         Right eye: No discharge.         Left eye: No discharge.      Conjunctiva/sclera: Conjunctivae normal.   Cardiovascular:      Rate and Rhythm: Normal rate and regular rhythm.      Pulses: Normal pulses.   Pulmonary:      Effort: Pulmonary effort is normal.      Breath sounds: Decreased breath sounds present. No wheezing.   Abdominal:      General: There is no distension.      Palpations: Abdomen is soft.   Musculoskeletal:         General: No swelling or tenderness.      Cervical back: Neck supple. No tenderness.   Skin:     General: Skin is warm and dry.   Neurological:      Mental Status: He is alert. Mental status is at baseline.   Psychiatric:      Comments: UTO 2/2 chronic neurologic deficits         Results Review:       I reviewed the patient's new clinical results.     I reviewed imaging, agree with interpretation.     I reviewed prior records.    Results from last 7 days   Lab Units 22  0944 22  1830   WBC 10*3/mm3 7.30 9.41   HEMOGLOBIN g/dL 13.9 14.0   PLATELETS 10*3/mm3 213 241     Results from last 7 days   Lab Units 22  0735 22  1640    SODIUM mmol/L 143 143   POTASSIUM mmol/L 5.0 4.3   CHLORIDE mmol/L 106 104   CO2 mmol/L 24.2 26.0   BUN mg/dL 15 20   CREATININE mg/dL 0.83 0.84   GLUCOSE mg/dL 91 119*   Estimated Creatinine Clearance: 60.3 mL/min (by C-G formula based on SCr of 0.83 mg/dL).  Results from last 7 days   Lab Units 02/09/22  0735 02/08/22  1640   CALCIUM mg/dL 9.3 10.0   ALBUMIN g/dL  --  4.30          clonazePAM, 0.25 mg, Per G Tube, Nightly  Divalproex Sodium, 125 mg, Oral, QAM  Divalproex Sodium, 250 mg, Oral, Nightly  insulin regular, 0-9 Units, Subcutaneous, Q6H       NPO Diet    Assessment/Plan      Active Hospital Problems    Diagnosis  POA   • Dislodged gastrostomy tube [T85.528A]  Yes      Resolved Hospital Problems   No resolved problems to display.       · Dislodged gastrostomy tube: Replaced today by IR.  Resume tube feeds.  Nutrition consulting.  · Diabetes: A1c 6.8.  Continue insulin.  · History of aphasia/dysphagia/hemiplegia secondary to prior stroke: Continue neurologic regimen and will check Depakote level.  · Hypertension: Blood pressure acceptable acutely.  Will monitor.  · Vascular dementia: See above  · Disposition: SNF/plan was for discharge today however transport is not available until tomorrow.    Greater than 35 minutes time spent with over half in counseling and coordination of care    Efraín Fitch MD  [facility]  02/09/22  12:45 EST    Dictated portions using Dragon dictation software.    During the entire encounter, I was wearing recommended PPE including face mask and eye protection. Hand sanitization was performed prior to entering room and upon exit.

## 2022-02-09 NOTE — PROGRESS NOTES
Discharge Planning Assessment  [facility]     Patient Name: Cam Gonsalves  MRN: [id number]  Today's Date: 2/9/2022    Admit Date: 2/8/2022     Discharge Needs Assessment    No documentation.                Discharge Plan     Row Name 02/09/22 1533       Plan    Plan Comments [facility] had a time open up and they can transport today at 7:30 PM. [facility] this evening at 7:30 PM by [facility]. Spoke with Radha/CARLEE and updated on the ambulance date and time changed until this evening and she is agreeable to discharge plans. OZZY Rivera RN, CCP.    Final Discharge Disposition Code 04 - intermediate care facility    Final Note Discharged to [facility] to IC medicaid bed by [facility]. OZZY Rivera RN, CCP.              Continued Care and Services - Admitted Since 2/8/2022     Destination Coordination complete.    Service Provider Request Status Selected Services Address Phone Fax Patient Preferred    [facility] [address] [facility] [address] [id number] [phone] --              Expected Discharge Date and Time     Expected Discharge Date Expected Discharge Time    Feb 9, 2022          Demographic Summary    No documentation.                Functional Status    No documentation.                Psychosocial    No documentation.                Abuse/Neglect    No documentation.                Legal    No documentation.                Substance Abuse    No documentation.                Patient Forms    No documentation.                   Rea Rivera RN

## 2022-02-09 NOTE — PROGRESS NOTES
Discharge Planning Assessment  [facility]     Patient Name: Cam Gonsalves  MRN: [id number]  Today's Date: 2/9/2022    Admit Date: 2/8/2022     Discharge Needs Assessment    No documentation.                Discharge Plan     Row Name 02/09/22 1203       Plan    Plan Comments The patient is from [facility] and per Trang they can accept back. I informed her that [facility] did not have any stretcher transport available today and neither did [facility]. Set up [facility] for 2/10/22 @ 09:00. Called Radha/Le and updated her also. Started discharge packet. OZZY Rivera RN, CCP              Continued Care and Services - Admitted Since 2/8/2022     Destination     Service Provider Request Status Selected Services Address Phone Fax Patient Preferred    [facility]  Pending - Request Sent N/A [address] [facility] [phone] --              Expected Discharge Date and Time     Expected Discharge Date Expected Discharge Time    Feb 9, 2022          Demographic Summary    No documentation.                Functional Status    No documentation.                Psychosocial    No documentation.                Abuse/Neglect    No documentation.                Legal    No documentation.                Substance Abuse    No documentation.                Patient Forms    No documentation.                   Rea Rivera RN

## 2022-02-09 NOTE — NURSING NOTE
Attempted to contact [facility]. Call was transferred to the unit, phone rang and rang for 20 minutes with no answer. Will try to call again

## 2022-02-09 NOTE — NURSING NOTE
Pt had fall today. Bed alarm sounded and pt was found sitting on his bottom in the floor with back up against the side of the bed. It appears as though pt wiggled himself out of the bed and slid into the floor. Pt's bed alarm was on zone 1 at the time, non skid socks on, 3 side rails up, bed in lowest position, call light was within reach. Pt was laughing upon arrival to the room. Pt does not appear to be hurt.  [facility]

## 2022-02-09 NOTE — PROGRESS NOTES
Discharge Planning Assessment  [facility]     Patient Name: Cam Gonsalves  MRN: [id number]  Today's Date: 2/9/2022    Admit Date: 2/8/2022     Discharge Needs Assessment    No documentation.                Discharge Plan     Row Name 02/09/22 1533       Plan    Plan Comments [facility] had a time open up and they can transport today at 7:30 PM. [facility] this [facility] at [facility]:30 PM by [facility]. Spoke with Radha/CARLEE and updated on the ambulance date and time changed until this evening and she is agreeable to discharge plans. OZZY Rivera RN, CCP.    Final Discharge Disposition Code 04 - intermediate care facility    Final Note Discharged to [facility] to IC medicaid bed by [facility]. OZZY Rivera RN, CCP.              Continued Care and Services - Admitted Since 2/8/2022     Destination Coordination complete.    Service Provider Request Status Selected Services Address Phone Fax Patient Preferred    [facility] [address] [facility] [id number] [phone] --              Expected Discharge Date and Time     Expected Discharge Date Expected Discharge Time    Feb 9, 2022          Demographic Summary    No documentation.                Functional Status    No documentation.                Psychosocial    No documentation.                Abuse/Neglect    No documentation.                Legal    No documentation.                Substance Abuse    No documentation.                Patient Forms    No documentation.                   Rea Rivera RN

## 2022-02-09 NOTE — PLAN OF CARE
Problem: Adult Inpatient Plan of Care  Goal: Plan of Care Review  Outcome: Ongoing, Progressing  Flowsheets (Taken 2/9/2022 0440)  Progress: no change  Plan of Care Reviewed With: patient  Outcome Summary: patient G tube was leaking some tonight, placed a medisus pad to protect skin from stomach acids. IV D51/2NS w/20 K @ 125 was DC at midnight. incontinece care., q2 turn but pt moves back onto back. nonverbal. NPO. going down to radiology today for placement of gtube. oral care done. haldol and zofran given x1. tolerated well. will continue to monitor and treat per MD     Problem: Adult Inpatient Plan of Care  Goal: Patient-Specific Goal (Individualized)  Outcome: Ongoing, Progressing  Flowsheets (Taken 2/9/2022 0440)  Patient-Specific Goals (Include Timeframe): to get new gtube placed and make sure works well.  Individualized Care Needs: incontience care, NPO, q2 turns

## 2022-11-16 PROBLEM — J96.01 ACUTE RESPIRATORY FAILURE WITH HYPOXIA AND HYPERCAPNIA: Status: ACTIVE | Noted: 2022-01-01

## 2022-11-16 PROBLEM — J96.02 ACUTE RESPIRATORY FAILURE WITH HYPOXIA AND HYPERCAPNIA (HCC): Status: ACTIVE | Noted: 2022-01-01

## 2022-11-17 PROBLEM — J44.1 COPD EXACERBATION (HCC): Status: ACTIVE | Noted: 2022-01-01

## 2022-11-17 PROBLEM — L03.311 ABDOMINAL WALL CELLULITIS: Status: ACTIVE | Noted: 2022-01-01

## 2022-11-17 NOTE — PROGRESS NOTES
[facility]    [phone]   LOS: 1 day     Name: Cam Gonsalves  Age/Sex: 77 y.o. male  :  1945        PCP: Enrique Carver MD  Chief Complaint   Patient presents with   • Shortness of Breath      Subjective   Called this morning for increasing lactic acid.  Patient is lethargic minimally responsive withdraws to pain and currently on BiPAP unable to assess review of systems            Objective   Vital Signs  Temp:  [99.4 °F (37.4 °C)-100.4 °F (38 °C)] 100.4 °F (38 °C)  Heart Rate:  [] 94  Resp:  [35-40] 37  BP: (118-156)/(67-93) 118/67  Body mass index is 19.16 kg/m².    Intake/Output Summary (Last 24 hours) at 2022 0840  Last data filed at 2022 0400  Gross per 24 hour   Intake 500 ml   Output --   Net 500 ml       Physical Exam  Vitals and nursing note reviewed.   Constitutional:       General: He is in acute distress.      Appearance: He is ill-appearing.      Comments: Somnolent lethargic withdraws to pain   Cardiovascular:      Rate and Rhythm: Regular rhythm. Tachycardia present.   Pulmonary:      Effort: Tachypnea present.      Breath sounds: No wheezing or rales.      Comments: Currently on BiPAP coarse breath sounds bilaterally  Abdominal:      General: There is no distension.      Tenderness: There is abdominal tenderness. There is guarding.      Comments: His abdomen is firm with noted guarding surrounding erythema regarding his G-tube but also noted in the right lower quadrant as well.   Skin:     General: Skin is warm and dry.           Results Review:       I reviewed the patient's new clinical results.  Results from last 7 days   Lab Units 22   WBC 10*3/mm3 22.97* 14.76*   HEMOGLOBIN g/dL 12.8* 13.7   PLATELETS 10*3/mm3 221 274     Results from last 7 days   Lab Units 22   SODIUM mmol/L 141 138   POTASSIUM mmol/L 4.9 5.2   CHLORIDE mmol/L 108* 103   CO2 mmol/L 21.2* 23.9   BUN mg/dL 26* 29*    CREATININE mg/dL 1.02 0.88   CALCIUM mg/dL 8.7 9.7   Estimated Creatinine Clearance: 48.3 mL/min (by C-G formula based on SCr of 1.02 mg/dL).      Assessment & Plan   Active Hospital Problems    Diagnosis  POA   • **Acute respiratory failure with hypoxia and hypercapnia (HCC) [J96.01, J96.02]  Unknown   • Abdominal wall cellulitis [L03.311]  Unknown   • COPD exacerbation (HCC) [J44.1]  Yes   • COPD with acute exacerbation (HCC) [J44.1]  Unknown   • Chronic viral hepatitis C (HCC) [B18.2]  Yes   • Vascular dementia (HCC) [F01.50]  Yes   • Sepsis (HCC) [A41.9]  Yes   • Type 2 diabetes mellitus with hyperglycemia (HCC) [E11.65]  Yes   • Hypertension [I10]  Yes   • Hyperlipidemia [E78.5]  Yes   • Dysphagia due to old stroke [I69.391]  Not Applicable      Resolved Hospital Problems   No resolved problems to display.       PLAN  This is a 77-year-old gentleman with history of COPD prior stroke dysphagia vascular dementia type 2 diabetes and chronic hepatitis C that presented to the hospital with shortness of breath and respiratory distress and was found to have abdominal wall cellulitis and sepsis  -At this point he is severely septic.  His lactic acid is going up despite antibiotics given in the emergency room last night.  Initially had plan to broaden antibiotic coverage with vancomycin and cefepime and consult general surgery for their evaluation.  I also ordered additional fluid bolus per sepsis protocol with repeat lactic acid every 6 hours until normal.  -However after evaluation and discussion with family this patient would not want aggressive interventions would not want surgery would not want drains would not want any aggressive measures performed.  We discussed his severe sepsis increasing white blood cell count and lactic acid and potential transfer to the ICU for closer monitoring and higher level of care.  We discussed that even with higher level of care based on his frailty comorbidities and current  presentation his likelihood of a poor outcome was very high.  The family at this time elects to pursue comfort measures and withdraw life-sustaining therapy.  He will be transferred to [facility] with comfort medications initiated and antibiotics and life-sustaining medications withdrawn.  Hospice consulted and as well as the palliative care team.  -Based on his current condition I would be quite surprised if he makes it through the day.    Disposition  Transition to palliative care today      Horacio Rubi MD  [facility]  11/17/22  08:40 EST

## 2022-11-17 NOTE — ED NOTES
.Nursing report ED to floor  Cam Gonsalves  77 y.o.  male    HPI :   Chief Complaint   Patient presents with    Shortness of Breath       Admitting doctor:   Chris Mejia MD    Admitting diagnosis:   The primary encounter diagnosis was Acute respiratory failure with hypoxia and hypercapnia (HCC). Diagnoses of Sepsis, due to unspecified organism, unspecified whether acute organ dysfunction present (HCC), Gastrostomy tube dependent (HCC), Abdominal wall cellulitis, Chronic obstructive pulmonary disease, unspecified COPD type (HCC), and Immobility syndrome were also pertinent to this visit.    Code status:   Current Code Status       Date Active Code Status Order ID Comments User Context       11/16/2022 2155 No CPR (Do Not Attempt to Resuscitate) [id number]  Indira Carmona APRN ED        Question Answer    Code Status (Patient has no pulse and is not breathing) No CPR (Do Not Attempt to Resuscitate)    Medical Interventions (Patient has pulse or is breathing) Full Support                    Allergies:   Patient has no known allergies.    Isolation:   No active isolations    Intake and Output  No intake or output data in the 24 hours ending 11/16/22 2331    Weight:       11/16/22 1958   Weight: 57.2 kg (126 lb)       Most recent vitals:   Vitals:    11/16/22 2002 11/16/22 2005 11/16/22 2041 11/16/22 2200   BP:   148/88    Pulse:   108 93   Resp:       Temp: 99.4 °F (37.4 °C)      TempSrc: Tympanic      SpO2:  97% 100% 92%   Weight:       Height:           Active LDAs/IV Access:   Lines, Drains & Airways       Active LDAs       Name Placement date Placement time Site Days    Peripheral IV 11/16/22 1945 Posterior;Left Hand 11/16/22 1945  Hand  less than 1    Peripheral IV 11/16/22 2005 Posterior;Right Hand 11/16/22 2005  Hand  less than 1    Gastrostomy/Enterostomy Gastrostomy 1 LUQ --  --  LUQ  --    Gastrostomy/Enterostomy 05/31/21 2044  --  534    Gastrostomy/Enterostomy Gastrostomy 18 Fr. LUQ  02/09/22  0921  LUQ  280    Ureteral Drain/Stent Left ureter 6 Fr. 02/25/21  1639  Left ureter  string off stent  629    Ureteral Drain/Stent Left ureter 6 Fr. 03/25/21  1518  Left ureter  601                    Labs (abnormal labs have a star):   Labs Reviewed   COMPREHENSIVE METABOLIC PANEL - Abnormal; Notable for the following components:       Result Value    Glucose 215 (*)     BUN 29 (*)     BUN/Creatinine Ratio 33.0 (*)     All other components within normal limits    Narrative:     GFR Normal >60  Chronic Kidney Disease <60  Kidney Failure <15    The GFR formula is only valid for adults with stable renal function between ages 18 and 70.   LACTIC ACID, PLASMA - Abnormal; Notable for the following components:    Lactate 2.3 (*)     All other components within normal limits   CBC WITH AUTO DIFFERENTIAL - Abnormal; Notable for the following components:    WBC 14.76 (*)     RDW 12.0 (*)     RDW-SD 34.9 (*)     MPV 12.9 (*)     Neutrophil % 78.9 (*)     Lymphocyte % 10.8 (*)     Immature Grans % 0.9 (*)     Neutrophils, Absolute 11.64 (*)     Monocytes, Absolute 1.30 (*)     Immature Grans, Absolute 0.13 (*)     All other components within normal limits   BLOOD GAS, ARTERIAL - Abnormal; Notable for the following components:    pH, Arterial 7.270 (*)     pCO2, Arterial 58.3 (*)     pO2, Arterial 66.7 (*)     Base Excess, Arterial -1.3 (*)     O2 Saturation Calculated 89.5 (*)     All other components within normal limits   POCT GLUCOSE FINGERSTICK - Abnormal; Notable for the following components:    Glucose 164 (*)     All other components within normal limits   RESPIRATORY PANEL PCR W/ COVID-19 (SARS-COV-2) [facility] IN-HOUSE, [facility], 3-4 HR TAT - Normal    Narrative:     In the setting of a positive respiratory panel with a viral infection PLUS a negative procalcitonin without other underlying concern for bacterial infection, consider observing off antibiotics or discontinuation of  "antibiotics and continue supportive care. If the respiratory panel is positive for atypical bacterial infection (Bordetella pertussis, Chlamydophila pneumoniae, or Mycoplasma pneumoniae), consider antibiotic de-escalation to target atypical bacterial infection.   BNP (IN-HOUSE) - Normal    Narrative:     Among patients with dyspnea, NT-proBNP is highly sensitive for the detection of acute congestive heart failure. In addition NT-proBNP of <300 pg/ml effectively rules out acute congestive heart failure with 99% negative predictive value.    Results may be falsely decreased if patient taking Biotin.     TROPONIN (IN-HOUSE) - Normal    Narrative:     Troponin T Reference Range:  <= 0.03 ng/mL-   Negative for AMI  >0.03 ng/mL-     Abnormal for myocardial necrosis.  Clinicians would have to utilize clinical acumen, EKG, Troponin and serial changes to determine if it is an Acute Myocardial Infarction or myocardial injury due to an underlying chronic condition.       Results may be falsely decreased if patient taking Biotin.     PROCALCITONIN - Normal    Narrative:     As a Marker for Sepsis (Non-Neonates):    1. <0.5 ng/mL represents a low risk of severe sepsis and/or septic shock.  2. >2 ng/mL represents a high risk of severe sepsis and/or septic shock.    As a Marker for Lower Respiratory Tract Infections that require antibiotic therapy:    PCT on Admission    Antibiotic Therapy       6-12 Hrs later    >0.5                Strongly Recommended  >0.25 - <0.5        Recommended   0.1 - 0.25          Discouraged              Remeasure/reassess PCT  <0.1                Strongly Discouraged     Remeasure/reassess PCT    As 28 day mortality risk marker: \"Change in Procalcitonin Result\" (>80% or <=80%) if Day 0 (or Day 1) and Day 4 values are available. Refer to http://www.[email or web address]-pct-calculator.[email or web address]    Change in PCT <=80%  A decrease of PCT levels below or equal to 80% defines a positive change in PCT test result representing a " higher risk for 28-day all-cause mortality of patients diagnosed with severe sepsis for septic shock.    Change in PCT >80%  A decrease of PCT levels of more than 80% defines a negative change in PCT result representing a lower risk for 28-day all-cause mortality of patients diagnosed with severe sepsis or septic shock.      BLOOD CULTURE   BLOOD CULTURE   BLOOD GAS, ARTERIAL   LACTIC ACID, REFLEX   BASIC METABOLIC PANEL   CBC (NO DIFF)   HEMOGLOBIN A1C   POCT GLUCOSE FINGERSTICK   POCT GLUCOSE FINGERSTICK   POCT GLUCOSE FINGERSTICK   CBC AND DIFFERENTIAL    Narrative:     The following orders were created for panel order CBC & Differential.  Procedure                               Abnormality         Status                     ---------                               -----------         ------                     CBC Auto Differential[[id number]]        Abnormal            Final result                 Please view results for these tests on the individual orders.       EKG:   ECG 12 Lead Rhythm Change   Preliminary Result   HEART RATE= 134  bpm   RR Interval= 448  ms   VT Interval= 141  ms   P Horizontal Axis= -15  deg   P Front Axis= 66  deg   QRSD Interval= 66  ms   QT Interval= 352  ms   QRS Axis= -31  deg   T Wave Axis= 208  deg   - ABNORMAL ECG -   Sinus tachycardia   Probable left atrial enlargement   Left axis deviation   Probable anterior infarct, age indeterminate   Prolonged QT interval   Electronically Signed By:    Date and Time of Study: 2022-11-16 23:20:47      ECG 12 Lead Dyspnea    (Results Pending)       Meds given in ED:   Medications   sodium chloride 0.9 % flush 10 mL (has no administration in time range)   albuterol sulfate HFA (PROVENTIL HFA;VENTOLIN HFA;PROAIR HFA) inhaler 2 puff (2 puffs Inhalation Not Given 11/16/22 2984)   sodium chloride 0.9 % flush 10 mL (has no administration in time range)   sodium chloride 0.9 % flush 10 mL (has no administration in time range)   nitroglycerin  (NITROSTAT) SL tablet 0.4 mg (has no administration in time range)   acetaminophen (TYLENOL) tablet 650 mg (has no administration in time range)     Or   acetaminophen (TYLENOL) 160 MG/5ML solution 650 mg (has no administration in time range)     Or   acetaminophen (TYLENOL) suppository 650 mg (has no administration in time range)   ondansetron (ZOFRAN) tablet 4 mg (has no administration in time range)     Or   ondansetron (ZOFRAN) injection 4 mg (has no administration in time range)   calcium carbonate (TUMS) chewable tablet 500 mg (200 mg elemental) (has no administration in time range)   ipratropium-albuterol (DUO-NEB) nebulizer solution 3 mL (has no administration in time range)   ipratropium-albuterol (DUO-NEB) nebulizer solution 3 mL (has no administration in time range)   dextrose (GLUTOSE) oral gel 15 g (has no administration in time range)   dextrose (D50W) (25 g/50 mL) IV injection 25 g (has no administration in time range)   glucagon (human recombinant) (GLUCAGEN DIAGNOSTIC) injection 1 mg (has no administration in time range)   insulin lispro (ADMELOG) injection 0-9 Units (has no administration in time range)   methylPREDNISolone sodium succinate (SOLU-Medrol) injection 60 mg (has no administration in time range)   vancomycin 1250 mg/250 mL 0.9% NS IVPB ([facility]) (1,250 mg Intravenous New Bag 11/16/22 2059)   piperacillin-tazobactam (ZOSYN) 3.375 g in iso-osmotic dextrose 50 ml (premix) (0 g Intravenous Stopped 11/16/22 2045)   lactated ringers bolus 2,000 mL (0 mL Intravenous Stopped 11/16/22 2058)   methylPREDNISolone sodium succinate (SOLU-Medrol) injection 125 mg (125 mg Intravenous Given 11/16/22 2129)   albuterol (PROVENTIL) nebulizer solution 0.083% 2.5 mg/3mL (2.5 mg Nebulization Given 11/16/22 2159)   iopamidol (ISOVUE-300) 61 % injection 100 mL (100 mL Intravenous Given 11/16/22 2233)       Imaging results:  CT Abdomen Pelvis With Contrast    Result Date: 11/16/2022   1. Bibasilar consolidation is  "nonspecific. Correlation with any evidence of pneumonia is recommended. 2. Thick-walled appearance to the urinary bladder. Correlation with urinalysis and urine cultures is recommended. 3. The patient does have soft tissue stranding within the right lateral abdominal wall, and extending over the right hip. No associated fluid collection to suggest abscess is seen. 4.Multiple nonobstructing stones identified within the left kidney. A stone at the left UPJ appears to have increased in size.  Radiation dose reduction techniques were utilized, including automated exposure control and exposure modulation based on body size.  This report was finalized on 11/16/2022 11:23 PM by Dr. Rolanda Almaguer M.D.      XR Chest 1 View    Result Date: 11/16/2022  No focal pulmonary consolidation. Borderline heart size. Tortuous aorta. Follow-up as clinically indicated.  This report was finalized on 11/16/2022 8:35 PM by Dr. James Royal M.D.       Ambulatory status:   - Bedrest    Social issues:   Social History     Socioeconomic History    Marital status:    Tobacco Use    Smoking status: Former    Smokeless tobacco: Never   Substance and Sexual Activity    Alcohol use: Not Currently    Drug use: Not Currently     Comment: unknown - per guardian he did use \"about everything\"    Sexual activity: Not Currently       NIH Stroke Scale:         Kassidy Figueredo RN  11/16/22 23:31 EST       "

## 2022-11-17 NOTE — PROGRESS NOTES
Discharge Planning Assessment  [facility]     Patient Name: Cam Gonsalves  MRN: [id number]  Today's Date: 11/17/2022    Admit Date: 11/16/2022        Discharge Needs Assessment    No documentation.                Discharge Plan     Row Name 11/17/22 1431       Plan    Plan Comments The patient transferred to [facility] from [facility]. The patient is palliative and [facility] to evaluate. Spoke with the niece and gave her the palliative [facility] folder along with the application for [facility] for indigent burial assistance. CCP will continue to follow for any needs that may arise. OZZY Rivera RN, CCP.              Continued Care and Services - Admitted Since 11/16/2022    Coordination has not been started for this encounter.          Demographic Summary    No documentation.                Functional Status    No documentation.                Psychosocial    No documentation.                Abuse/Neglect    No documentation.                Legal    No documentation.                Substance Abuse    No documentation.                Patient Forms    No documentation.                   Rea Rivera RN

## 2022-11-17 NOTE — CONSULTS
"Visit with patient and niece at bedside. Patient was unresponsive during visit. Niece is a strong woman who has cared for many people over her lifetime.  The family is spiritual and she asked me to pray that \"her uncle take flight.\" Prayer and supportive conversation offered.  She is aware that chaplains are available for continued support. Towards the end of visit patient became tense and agitated. Nurse was notified.   "

## 2022-11-17 NOTE — CONSULTS
[facility] Palliative Care Services    Palliative Care Initial Consult   Attending Physician: Horacio Rubi MD  Referring Provider: Horacio Rubi MD     Reason for Referral: assistance with withdrawal of life prolonging interventions  Family/Support: Radha joseph    Code Status and Medical Interventions:   Ordered at: 11/17/22 0830     Level Of Support Discussed With:    Health Care Surrogate     Code Status (Patient has no pulse and is not breathing):    No CPR (Do Not Attempt to Resuscitate)     Medical Interventions (Patient has pulse or is breathing):    Comfort Measures     Goals of Care: To focus on comfort measures and transfer to inpatient palliative care unit.     HPI:   77 y.o. male with history of  has a past medical history of Anxiety, Aphasia, BPH (benign prostatic hyperplasia), Cerebral infarction (HCC), Cervicalgia, Chronic viral hepatitis C (HCC), COPD (chronic obstructive pulmonary disease) (HCC), Coronary artery disease, Diabetes mellitus (HCC), Dysphagia, oropharyngeal phase, Enterocolitis, Gonzalez catheter in place, G tube feedings (HCC), Gastrointestinal hemorrhage, Gastrostomy present (HCC), Gastrostomy tube in place (HCC), Hemiplegia and hemiparesis following cerebral infarction affecting right dominant side (HCC), History of COVID-19, History of MRSA infection, History of sepsis, Hydronephrosis, Hyperlipidemia, Hypertension, Kidney failure, acute (HCC), Kidney stone, Myocardial infarction (HCC), Other seizures (HCC), Pneumonitis, Repeated falls, Rhabdomyolysis, Seizure (HCC), Stroke (HCC), and Unspecified severe protein-calorie malnutrition (HCC). He resided at [facility] at discharge.   Patient presented to [facility] on 11/16/2022 related to shortness of breath and respiratory distress, oxygen saturation of 80% per EMS.He was placed on BiPAP upon arrival to the ED. In ER, WBC 14.76, normal BNP, normal troponin, lactic acid 2.3,  normal procalcitonin,normal respiratory panel. CXR with no focal pulmonary consolidation. CT abd/pelvis concerning for soft tissue stranding within the right lateral abdominal wall, and extending over the right hip. No associated fluid collection to suggest abscess is seen.General surgery was consulted. During course of night, repeat lactic acid continue to rise 3.4, and most recent 4.6, despite fluid resuscitation. In addition, he was given vancomycin and cefepime in ER. Based on worsening condition, Dr Rubi spoke with family regarding goals and it was decided based on his fragility and comorbidities to focus on comfort measures, rather than escalating care. The palliative care team was consulted for support.   Palliative Care Spoke With: family  Quality of life: poor  Due to the Palliative Care Topics Discussed: palliative care, goals of care, care options and discharge options we will establish an advance care plan.   Advance Care Planning   Advance Care Planning Discussion: see below          Review of Systems   Unable to perform ROS: dementia       1- Pain Assessment  PAINAD Breathin-->noisy labored breathing, long period of hyperventilation, Cheyne-Ewing respirations  PAINAD Negative Vocalization: 0-->none  PAINAD Facial Expression: 1-->sad, frightened, frown  PAINAD Body Language: 1-->tense, distressed pacing, fidgeting  PAINAD Consolability: 1-->distracted or reassured by voice/touch  PAINAD Score: 5    Past Medical History:   Diagnosis Date   • Anxiety    • Aphasia    • BPH (benign prostatic hyperplasia)    • Cerebral infarction (HCC)    • Cervicalgia    • Chronic viral hepatitis C (HCC)    • COPD (chronic obstructive pulmonary disease) (HCC)    • Coronary artery disease    • Diabetes mellitus (HCC)    • Dysphagia, oropharyngeal phase    • Enterocolitis    • Gonzalez catheter in place    • G tube feedings ([facility])     JEVITY 1.2 AT 60 CC/HR   • Gastrointestinal hemorrhage    • Gastrostomy present ([facility])   "  • Gastrostomy tube in place (HCC)    • Hemiplegia and hemiparesis following cerebral infarction affecting right dominant side (HCC)    • History of COVID-19     11/2020   • History of MRSA infection    • History of sepsis    • Hydronephrosis    • Hyperlipidemia    • Hypertension    • Kidney failure, acute (HCC)    • Kidney stone    • Myocardial infarction (HCC)    • Other seizures (HCC)    • Pneumonitis    • Repeated falls    • Rhabdomyolysis    • Seizure (HCC)    • Stroke (HCC)    • Unspecified severe protein-calorie malnutrition (HCC)      Past Surgical History:   Procedure Laterality Date   • CYSTOSCOPY W/ URETERAL STENT PLACEMENT Left 2/25/2021    Procedure: CYSTOSCOPY left retrograde pyelogram, left URETERAL STENT INSERTION;  Surgeon: Rick Charles Jr., MD;  Location: [facility];  Service: Urology;  Laterality: Left;   • ENDOSCOPY W/ PEG TUBE PLACEMENT N/A 5/31/2021    Procedure: ESOPHAGOGASTRODUODENOSCOPY WITH PERCUTANEOUS ENDOSCOPIC GASTROSTOMY TUBE INSERTION;  Surgeon: Martell Ramirez Jr., MD;  Location: [facility];  Service: General;  Laterality: N/A;   • GTUBE INSERTION     • URETEROSCOPY LASER LITHOTRIPSY WITH STENT INSERTION Left 3/25/2021    Procedure: CYSTOSCOPY URETEROSCOPY LASER LITHOTRIPSY STONE BASKET EXTRACION STENT EXCHANGE;  Surgeon: Rick Charles Jr., MD;  Location: [facility];  Service: Urology;  Laterality: Left;     Social History     Socioeconomic History   • Marital status:    Tobacco Use   • Smoking status: Former   • Smokeless tobacco: Never   Substance and Sexual Activity   • Alcohol use: Not Currently   • Drug use: Not Currently     Comment: unknown - per guardian he did use \"about everything\"   • Sexual activity: Not Currently       Current Facility-Administered Medications   Medication Dose Route Frequency Provider Last Rate Last Admin   • acetaminophen (TYLENOL) tablet 650 mg  650 mg Oral Q4H PRN Horacio Rubi MD        Or   • acetaminophen " (TYLENOL) 160 MG/5ML solution 650 mg  650 mg Oral Q4H PRN Horacio Rubi MD        Or   • acetaminophen (TYLENOL) suppository 650 mg  650 mg Rectal Q4H PRN Horacio Rubi MD       • diphenoxylate-atropine (LOMOTIL) 2.5-0.025 MG per tablet 1 tablet  1 tablet Oral Q2H PRN Horacio Rubi MD       • Glycerin-Hypromellose- (ARTIFICIAL TEARS) 0.2-0.2-1 % ophthalmic solution solution 1 drop  1 drop Both Eyes Q30 Min PRN Horacio Rubi MD       • glycopyrrolate (ROBINUL) injection 0.2 mg  0.2 mg Intravenous Q2H PRN Horacio Rubi MD        Or   • glycopyrrolate (ROBINUL) injection 0.2 mg  0.2 mg Subcutaneous Q2H PRN Horacio Rubi MD        Or   • glycopyrrolate (ROBINUL) injection 0.4 mg  0.4 mg Intravenous Q2H PRN Horacio Rubi MD        Or   • glycopyrrolate (ROBINUL) injection 0.4 mg  0.4 mg Subcutaneous Q2H PRN Horacio Rubi MD       • haloperidol (HALDOL) tablet 1 mg  1 mg Oral Q4H PRN Horacio Rubi MD        Or   • haloperidol (HALDOL) 2 MG/ML solution 1 mg  1 mg Oral Q4H PRN Horacio Rubi MD        Or   • haloperidol lactate (HALDOL) injection 1 mg  1 mg Subcutaneous Q4H PRN Horacio Rubi MD       • HYDROmorphone (DILAUDID) injection 0.5 mg  0.5 mg Intravenous Q1H PRN Horacio Rubi MD        Or   • Morphine injection 2 mg  2 mg Intravenous Q1H PRN Horacio Rubi MD        Or   • morphine concentrated solution 5 mg  5 mg Sublingual Q1H PRN Horacio Rubi MD        Or   • ketorolac (TORADOL) injection 15 mg  15 mg Intravenous Q6H PRN Horacio Rubi MD       • HYDROmorphone (DILAUDID) injection 1 mg  1 mg Intravenous Q1H PRN Horacio Rubi MD        Or   • Morphine injection 4 mg  4 mg Intravenous Q1H PRN Horacio Rubi MD        Or   • morphine concentrated solution 10 mg  10 mg Sublingual Q1H PRN Horacio Rubi MD       • HYDROmorphone (DILAUDID) injection 1.5 mg  1.5 mg Intravenous Q1H PRN  Horacio Rubi MD        Or   • Morphine injection 6 mg  6 mg Intravenous Q1H PRN Horacio Rubi MD        Or   • morphine concentrated solution 20 mg  20 mg Sublingual Q1H PRN Horacio Rubi MD       • LORazepam (ATIVAN) injection 0.5 mg  0.5 mg Intravenous Q1H PRN Horacio Rubi MD        Or   • LORazepam (ATIVAN) injection 0.5 mg  0.5 mg Subcutaneous Q1H PRN Horacio Rubi MD        Or   • LORazepam (ATIVAN) 2 MG/ML concentrated solution 0.5 mg  0.5 mg Sublingual Q1H PRN Horacio Rubi MD       • LORazepam (ATIVAN) injection 1 mg  1 mg Intravenous Q1H PRN Horacio Rubi MD        Or   • LORazepam (ATIVAN) injection 1 mg  1 mg Subcutaneous Q1H PRN Horacio Rubi MD        Or   • LORazepam (ATIVAN) 2 MG/ML concentrated solution 1 mg  1 mg Sublingual Q1H PRN Horacio Rubi MD       • LORazepam (ATIVAN) injection 2 mg  2 mg Intravenous Q1H PRN Horacio Rubi MD        Or   • LORazepam (ATIVAN) injection 2 mg  2 mg Subcutaneous Q1H PRN Horacio Rubi MD        Or   • LORazepam (ATIVAN) 2 MG/ML concentrated solution 2 mg  2 mg Sublingual Q1H PRN Horacio Rubi MD       • ondansetron (ZOFRAN) tablet 4 mg  4 mg Oral Q6H PRN Horacio Rubi MD        Or   • ondansetron (ZOFRAN) injection 4 mg  4 mg Intravenous Q6H PRN Horacio Rubi MD            •  acetaminophen **OR** acetaminophen **OR** acetaminophen  •  diphenoxylate-atropine  •  Glycerin-Hypromellose-  •  glycopyrrolate **OR** glycopyrrolate **OR** glycopyrrolate **OR** glycopyrrolate  •  haloperidol **OR** haloperidol **OR** haloperidol lactate  •  HYDROmorphone **OR** Morphine **OR** morphine **OR** ketorolac  •  HYDROmorphone **OR** Morphine **OR** morphine  •  HYDROmorphone **OR** Morphine **OR** morphine  •  LORazepam **OR** LORazepam **OR** LORazepam  •  LORazepam **OR** LORazepam **OR** LORazepam  •  LORazepam **OR** LORazepam **OR** LORazepam  •  ondansetron **OR**  "ondansetron    No Known Allergies  Attest that current medications reviewed including but not limited to prescriptions, over-the counter, herbals and vitamin/mineral/dietary (nutritional) supplements for route, type, dose and frequency and are current per MAR at time of dictation.      Intake/Output Summary (Last 24 hours) at 11/17/2022 0859  Last data filed at 11/17/2022 0400  Gross per 24 hour   Intake 500 ml   Output --   Net 500 ml       Physical Exam:    Diagnostics: Reviewed  /67   Pulse 94   Temp 100.4 °F (38 °C) (Axillary)   Resp (!) 37   Ht 172.7 cm (68\")   Wt 57.2 kg (126 lb)   SpO2 96%   BMI 19.16 kg/m²     Constitutional:       Appearance: Ill-appearing, chronically ill-appearing and acutely ill-appearing.      Comments: BIPAP   purewick with yellow urine    Pulmonary:      Effort: Pulmonary effort is normal.      Breath sounds: Examination of the right-lower field reveals decreased breath sounds. Examination of the left-lower field reveals decreased breath sounds. Decreased breath sounds present.   Cardiovascular:      PMI at left midclavicular line. Tachycardia present. Regular rhythm.   Edema:     Peripheral edema absent.   Abdominal:      General: Bowel sounds are decreased. There is distension.      Tenderness: There is abdominal tenderness.      Comments: Erythema noted around PEG site  Abdomen firm   Neurological:      Comments: Responds to noxious stimuli only   No opening of eyes       Patient status: Disease state: No further treatment being pursued.  Functional status: Palliative Performance Scale Score: Performance 10% based on the following measures: Ambulation: Totally bed bound, Activity and Evidence of Disease: Unable to do any work, extensive evidence of disease, Self-Care: Total care required,  Intake: Mouth care only, LOC: Drowsy or comatose   Nutritional status: per feeding tube Body mass index is 19.16 kg/m².    Family support: The patient receives support from his " extended family..  Advance Directives: Advance Directive Status: Patient has advance directive, copy in chart   POA/Healthcare surrogate-n/a.         Impression/Problem List:    1. Acute respiratory failure with hypoxia and hypercapnia   2. Abdominal wall cellulitis   3. Sepsis   4. Vascular Dementia  5. COPD   6. Dysphagia with PEG  7. Diabetes mellitus type II  8. Prior CVA with residual deficit    9.  Chronic hepatitis C        Recommendations/Plan:  1.Transfer to inpatient palliative care unit for comfort measures  2. [facility] consult pending       2.  Palliative care encounter  The patient is unable to participate in goals of care conversation due to dementia and nonverbal. I reached out to his niece, Radha who is listed as next of kin. She has already talked with Dr Rubi this morning and understands how ill her uncle is. She shares with me that the patient has not had a good quality of life for the last three years and has been dependent on tube feeding for that time frame.  She doesn't want to escalate care and just wants him to be comfortable. She also shares with me that she is having to bury her father (patient brother) this Saturday. She is overwhelmed as the only child of her father and the designated individual to oversee her uncle who has no children and no wife. I informed her of palliative care unit, visitor policy and unit structure. She will be coming to hospital at some point today and will inform her other cousins as well if they want to visit. Will ask PC  to support once she arrives. I spoke with EUGENE Morrell.       Thank you for this consult and allowing us to participate in patient's plan of care. Palliative Care Team will continue to follow patient.     Time spent:30 minutes spent reviewing medical and medication records, assessing and examining patient, discussing with family, answering questions, providing some guidance about a plan and documentation of care, and coordinating care  with other healthcare members, with > 50% time spent face to face.       Toya Vaughn, [place]  11/17/2022  08:59 EST

## 2022-11-17 NOTE — ED PROVIDER NOTES
EMERGENCY DEPARTMENT ENCOUNTER    Room Number:  33/33  Date seen:  11/16/2022  PCP: Enrique Carver MD  Historian: EMS      HPI:  Chief Complaint: Respiratory distress  A complete HPI/ROS/PMH/PSH/SH/FH are unobtainable due to: Patient nonverbal  Context: Cam Gonsalves is a 77 y.o. male who presents to the ED for evaluation of respiratory distress.  He presents from nursing facility.  EMS reports that patient reportedly had room air oxygen saturation 80% upon their arrival.  He was placed on nonrebreather prior to arrival with improvement of oxygen saturations.  He reportedly has a history of COPD, prior stroke and is nonverbal at baseline.  It is unknown exactly when symptoms began.  EMS reports patient was also tachycardic prior to arrival.  EMS provides documentation the patient is a DNR.    EMS noted some redness around the patient's G-tube site.  It is unknown when the G-tube was last replaced.      PAST MEDICAL HISTORY  Active Ambulatory Problems     Diagnosis Date Noted   • Gastrointestinal hemorrhage 01/02/2020   • Clostridium difficile enterocolitis 01/02/2020   • Dysphagia due to old stroke 01/02/2020   • Diabetes mellitus ([facility]) 01/02/2020   • Long term current use of anticoagulant therapy 01/02/2020   • Unspecified severe protein-calorie malnutrition ([facility]) 01/03/2020   • Sepsis ([facility]) 02/24/2021   • GERD (gastroesophageal reflux disease) 02/24/2021   • Hemiplegia and hemiparesis following cerebral infarction affecting right dominant side ([facility]) 02/24/2021   • Abnormal urinalysis 02/24/2021   • Type 2 diabetes mellitus with hyperglycemia ([facility]) 02/24/2021   • Deviation of right eye 02/24/2021   • Hypertension    • Hyperlipidemia    • On tube feeding diet    • Gastrostomy tube in place ([facility])    • E coli bacteremia 02/25/2021   • Left ureteral stone 02/25/2021   • Vascular dementia ([facility]) 05/29/2021   • History of dysphagia 05/29/2021   • Renal infarction ([facility]) 10/09/2021   • UTI (urinary tract  infection) 10/09/2021   • Dysphagia, oropharyngeal phase    • Chronic viral hepatitis C (HCC)    • COPD (chronic obstructive pulmonary disease) (HCC)    • Vomiting    • Dislodged gastrostomy tube 02/08/2022     Resolved Ambulatory Problems     Diagnosis Date Noted   • Metabolic encephalopathy 02/24/2021   • Pulmonary venous congestion 02/24/2021   • Dislodged gastrostomy tube 05/29/2021     Past Medical History:   Diagnosis Date   • Anxiety    • Aphasia    • BPH (benign prostatic hyperplasia)    • Cerebral infarction (HCC)    • Cervicalgia    • Coronary artery disease    • Enterocolitis    • Gonzalez catheter in place    • G tube feedings (HCC)    • Gastrostomy present (HCC)    • History of COVID-19    • History of MRSA infection    • History of sepsis    • Hydronephrosis    • Kidney failure, acute (HCC)    • Kidney stone    • Myocardial infarction (HCC)    • Other seizures (HCC)    • Pneumonitis    • Repeated falls    • Rhabdomyolysis    • Seizure (HCC)    • Stroke (HCC)          PAST SURGICAL HISTORY  Past Surgical History:   Procedure Laterality Date   • CYSTOSCOPY W/ URETERAL STENT PLACEMENT Left 2/25/2021    Procedure: CYSTOSCOPY left retrograde pyelogram, left URETERAL STENT INSERTION;  Surgeon: Rick Charles Jr., MD;  Location: [facility];  Service: Urology;  Laterality: Left;   • ENDOSCOPY W/ PEG TUBE PLACEMENT N/A 5/31/2021    Procedure: ESOPHAGOGASTRODUODENOSCOPY WITH PERCUTANEOUS ENDOSCOPIC GASTROSTOMY TUBE INSERTION;  Surgeon: Martell Ramirez Jr., MD;  Location: [facility];  Service: General;  Laterality: N/A;   • GTUBE INSERTION     • URETEROSCOPY LASER LITHOTRIPSY WITH STENT INSERTION Left 3/25/2021    Procedure: CYSTOSCOPY URETEROSCOPY LASER LITHOTRIPSY STONE BASKET EXTRACION STENT EXCHANGE;  Surgeon: Rick Charles Jr., MD;  Location: [facility];  Service: Urology;  Laterality: Left;         FAMILY HISTORY  No family history on file.      SOCIAL HISTORY  Social History  "    Socioeconomic History   • Marital status:    Tobacco Use   • Smoking status: Former   • Smokeless tobacco: Never   Substance and Sexual Activity   • Alcohol use: Not Currently   • Drug use: Not Currently     Comment: unknown - per guardian he did use \"about everything\"   • Sexual activity: Not Currently         ALLERGIES  Patient has no known allergies.        REVIEW OF SYSTEMS  Review of Systems   Unable to complete review of systems due to patient being nonverbal and due to the acuity of his condition      PHYSICAL EXAM  ED Triage Vitals   Temp Heart Rate Resp BP SpO2   11/16/22 2002 11/16/22 1958 11/16/22 1958 11/16/22 1958 11/16/22 1958   99.4 °F (37.4 °C) (!) 134 (!) 40 144/93 91 %      Temp src Heart Rate Source Patient Position BP Location FiO2 (%)   11/16/22 2002 -- -- -- --   Tympanic             GENERAL: Patient is awake, chronically ill-appearing, appears to be in moderate respiratory distress  HENT: nares patent, teeth are clenched  EYES: no scleral icterus, pupils 3 mm reactive bilaterally  CV: regular rhythm, tachycardic  RESPIRATORY: Tachypneic, accessory muscle use present, expiratory wheezing noted, coarse rhonchi present bilaterally  ABDOMEN: soft, mild erythema and induration of the anterior abdominal wall around the gastrostomy tube site, G-tube in place  MUSCULOSKELETAL: Contracture noted of the right upper extremity  NEURO: Patient is nonverbal, awake, unable to follow commands  SKIN: warm, dry, anterior abdominal wall erythema and warmth present    Vital signs and nursing notes reviewed.          LAB RESULTS  Recent Results (from the past 24 hour(s))   Comprehensive Metabolic Panel    Collection Time: 11/16/22  8:04 PM    Specimen: Arm, Left; Blood   Result Value Ref Range    Glucose 215 (H) 65 - 99 mg/dL    BUN 29 (H) 8 - 23 mg/dL    Creatinine 0.88 0.76 - 1.27 mg/dL    Sodium 138 136 - 145 mmol/L    Potassium 5.2 3.5 - 5.2 mmol/L    Chloride 103 98 - 107 mmol/L    CO2 23.9 22.0 - " 29.0 mmol/L    Calcium 9.7 8.6 - 10.5 mg/dL    Total Protein 7.6 6.0 - 8.5 g/dL    Albumin 3.90 3.50 - 5.20 g/dL    ALT (SGPT) 15 1 - 41 U/L    AST (SGOT) 25 1 - 40 U/L    Alkaline Phosphatase 84 39 - 117 U/L    Total Bilirubin 0.2 0.0 - 1.2 mg/dL    Globulin 3.7 gm/dL    A/G Ratio 1.1 g/dL    BUN/Creatinine Ratio 33.0 (H) 7.0 - 25.0    Anion Gap 11.1 5.0 - 15.0 mmol/L    eGFR 88.0 >60.0 mL/min/1.73   BNP    Collection Time: 11/16/22  8:04 PM    Specimen: Arm, Left; Blood   Result Value Ref Range    proBNP 644.0 0.0 - 1,800.0 pg/mL   Troponin    Collection Time: 11/16/22  8:04 PM    Specimen: Arm, Left; Blood   Result Value Ref Range    Troponin T <0.010 0.000 - 0.030 ng/mL   Lactic Acid, Plasma    Collection Time: 11/16/22  8:04 PM    Specimen: Arm, Left; Blood   Result Value Ref Range    Lactate 2.3 (C) 0.5 - 2.0 mmol/L   Procalcitonin    Collection Time: 11/16/22  8:04 PM    Specimen: Arm, Left; Blood   Result Value Ref Range    Procalcitonin 0.11 0.00 - 0.25 ng/mL   CBC Auto Differential    Collection Time: 11/16/22  8:04 PM    Specimen: Arm, Left; Blood   Result Value Ref Range    WBC 14.76 (H) 3.40 - 10.80 10*3/mm3    RBC 5.08 4.14 - 5.80 10*6/mm3    Hemoglobin 13.7 13.0 - 17.7 g/dL    Hematocrit 41.0 37.5 - 51.0 %    MCV 80.7 79.0 - 97.0 fL    MCH 27.0 26.6 - 33.0 pg    MCHC 33.4 31.5 - 35.7 g/dL    RDW 12.0 (L) 12.3 - 15.4 %    RDW-SD 34.9 (L) 37.0 - 54.0 fl    MPV 12.9 (H) 6.0 - 12.0 fL    Platelets 274 140 - 450 10*3/mm3    Neutrophil % 78.9 (H) 42.7 - 76.0 %    Lymphocyte % 10.8 (L) 19.6 - 45.3 %    Monocyte % 8.8 5.0 - 12.0 %    Eosinophil % 0.4 0.3 - 6.2 %    Basophil % 0.2 0.0 - 1.5 %    Immature Grans % 0.9 (H) 0.0 - 0.5 %    Neutrophils, Absolute 11.64 (H) 1.70 - 7.00 10*3/mm3    Lymphocytes, Absolute 1.60 0.70 - 3.10 10*3/mm3    Monocytes, Absolute 1.30 (H) 0.10 - 0.90 10*3/mm3    Eosinophils, Absolute 0.06 0.00 - 0.40 10*3/mm3    Basophils, Absolute 0.03 0.00 - 0.20 10*3/mm3    Immature Grans,  Absolute 0.13 (H) 0.00 - 0.05 10*3/mm3    nRBC 0.0 0.0 - 0.2 /100 WBC   Respiratory Panel PCR w/COVID-19(SARS-CoV-2) [facility] In-House, NP Swab in UTM/VTM, 3-4 HR TAT - Swab, Nasopharynx    Collection Time: 11/16/22  8:12 PM    Specimen: Nasopharynx; Swab   Result Value Ref Range    ADENOVIRUS, PCR Not Detected Not Detected    Coronavirus 229E Not Detected Not Detected    Coronavirus HKU1 Not Detected Not Detected    Coronavirus NL63 Not Detected Not Detected    Coronavirus OC43 Not Detected Not Detected    COVID19 Not Detected Not Detected - Ref. Range    Human Metapneumovirus Not Detected Not Detected    Human Rhinovirus/Enterovirus Not Detected Not Detected    Influenza A PCR Not Detected Not Detected    Influenza B PCR Not Detected Not Detected    Parainfluenza Virus 1 Not Detected Not Detected    Parainfluenza Virus 2 Not Detected Not Detected    Parainfluenza Virus 3 Not Detected Not Detected    Parainfluenza Virus 4 Not Detected Not Detected    RSV, PCR Not Detected Not Detected    Bordetella pertussis pcr Not Detected Not Detected    Bordetella parapertussis PCR Not Detected Not Detected    Chlamydophila pneumoniae PCR Not Detected Not Detected    Mycoplasma pneumo by PCR Not Detected Not Detected   POC Glucose Once    Collection Time: 11/16/22  8:36 PM    Specimen: Blood   Result Value Ref Range    Glucose 164 (H) 70 - 130 mg/dL   Blood Gas, Arterial -    Collection Time: 11/16/22  8:59 PM    Specimen: Arterial Blood   Result Value Ref Range    Site Arterial: left radial     Ralf's Test Positive     pH, Arterial 7.270 (C) 7.350 - 7.450 pH units    pCO2, Arterial 58.3 (C) 35.0 - 45.0 mm Hg    pO2, Arterial 66.7 (L) 80.0 - 100.0 mm Hg    HCO3, Arterial 26.8 22.0 - 28.0 mmol/L    Base Excess, Arterial -1.3 (L) 0.0 - 2.0 mmol/L    O2 Saturation Calculated 89.5 (L) 92.0 - 99.0 %    A-a DO2 0.1 mmHg    Barometric Pressure for Blood Gas 757.7 mmHg    Modality BiPap     FIO2 100 %    Rate 36  Breaths/minute       Ordered the above labs and reviewed the results.        RADIOLOGY  XR Chest 1 View    Result Date: 11/16/2022  XR CHEST 1 VW-  HISTORY: Male who is 77 years-old,  short of breath  TECHNIQUE: Frontal view of the chest  COMPARISON: 10/30/2021  FINDINGS: Tubing projects over the left upper and lower hemithorax, correlate clinically. The heart size is borderline. Pulmonary vasculature is unremarkable. Aorta appears tortuous. No focal pulmonary consolidation, pleural effusion, or pneumothorax. No acute osseous process.      No focal pulmonary consolidation. Borderline heart size. Tortuous aorta. Follow-up as clinically indicated.  This report was finalized on 11/16/2022 8:35 PM by Dr. James Royal M.D.        Ordered the above noted radiological studies. Reviewed by me in PACS.            PROCEDURES  Critical Care  Performed by: Lex Larsen MD  Authorized by: Lex Larsen MD     Critical care provider statement:     Critical care time (minutes):  45    Critical care time was exclusive of:  Separately billable procedures and treating other patients    Critical care was necessary to treat or prevent imminent or life-threatening deterioration of the following conditions:  Respiratory failure    Critical care was time spent personally by me on the following activities:  Ordering and review of laboratory studies, ordering and review of radiographic studies, ordering and performing treatments and interventions, discussions with consultants, development of treatment plan with patient or surrogate, pulse oximetry, re-evaluation of patient's condition, evaluation of patient's response to treatment, review of old charts and examination of patient                  MEDICATIONS GIVEN IN ER  Medications   sodium chloride 0.9 % flush 10 mL (has no administration in time range)   albuterol sulfate HFA (PROVENTIL HFA;VENTOLIN HFA;PROAIR HFA) inhaler 2 puff (2 puffs Inhalation Not Given  11/16/22 2153)   sodium chloride 0.9 % flush 10 mL (has no administration in time range)   sodium chloride 0.9 % flush 10 mL (has no administration in time range)   nitroglycerin (NITROSTAT) SL tablet 0.4 mg (has no administration in time range)   acetaminophen (TYLENOL) tablet 650 mg (has no administration in time range)     Or   acetaminophen (TYLENOL) 160 MG/5ML solution 650 mg (has no administration in time range)     Or   acetaminophen (TYLENOL) suppository 650 mg (has no administration in time range)   ondansetron (ZOFRAN) tablet 4 mg (has no administration in time range)     Or   ondansetron (ZOFRAN) injection 4 mg (has no administration in time range)   calcium carbonate (TUMS) chewable tablet 500 mg (200 mg elemental) (has no administration in time range)   ipratropium-albuterol (DUO-NEB) nebulizer solution 3 mL (has no administration in time range)   ipratropium-albuterol (DUO-NEB) nebulizer solution 3 mL (has no administration in time range)   dextrose (GLUTOSE) oral gel 15 g (has no administration in time range)   dextrose (D50W) (25 g/50 mL) IV injection 25 g (has no administration in time range)   glucagon (human recombinant) (GLUCAGEN DIAGNOSTIC) injection 1 mg (has no administration in time range)   insulin lispro (ADMELOG) injection 0-9 Units (has no administration in time range)   methylPREDNISolone sodium succinate (SOLU-Medrol) injection 60 mg (has no administration in time range)   vancomycin 1250 mg/250 mL 0.9% NS IVPB ([facility]) (1,250 mg Intravenous New Bag 11/16/22 2059)   piperacillin-tazobactam (ZOSYN) 3.375 g in iso-osmotic dextrose 50 ml (premix) (0 g Intravenous Stopped 11/16/22 2045)   lactated ringers bolus 2,000 mL (0 mL Intravenous Stopped 11/16/22 2058)   methylPREDNISolone sodium succinate (SOLU-Medrol) injection 125 mg (125 mg Intravenous Given 11/16/22 2129)   albuterol (PROVENTIL) nebulizer solution 0.083% 2.5 mg/3mL (2.5 mg Nebulization Given 11/16/22 2159)   iopamidol (ISOVUE-300)  61 % injection 100 mL (100 mL Intravenous Given 11/16/22 2233)                   MEDICAL DECISION MAKING, PROGRESS, and CONSULTS    All labs have been independently reviewed by me.  All radiology studies have been reviewed by me and discussed with radiologist dictating the report.   EKG's independently viewed and interpreted by me.  Discussion below represents my analysis of pertinent findings related to patient's condition, differential diagnosis, treatment plan and final disposition.      Differential diagnosis:  My differential diagnosis for dyspnea includes but is not limited to:  Asthma, COPD, pneumonia, pulmonary embolus, acute respiratory distress syndrome, pneumothorax, pleural effusion, pulmonary fibrosis, congestive heart failure, myocardial infarction, DKA, uremia, acidosis, sepsis, anemia, drug related, hyperventilation, CNS disease      ED Course as of 11/16/22 2235 Wed Nov 16, 2022 2000 Patient presents with clinical symptoms concerning for sepsis with hypoxic respiratory failure, tachycardia, apparent erythema and warmth of the anterior abdominal wall around the gastrostomy tube site.  I have ordered empiric vancomycin and Zosyn, 2 L lactated ringer bolus.  CODE STATUS reviewed from our previous records as well as nursing home records and patient is reportedly a DNR.  I am unsure of his wishes regarding intubation.  I have ordered BiPAP for the time being. [JR]   2007 I called and spoke to patient's patient contact, Radha More.  She reports that patient is in fact a DNR.  She does not believe that he would want to be intubated.  She would like an update once we know more and then we can readdress goals of care. [JR]   2039 WBC(!): 14.76 [JR]   2120 Discussed with Dr. Vick, pulmonary and critical care, who agrees to consult. [JR]   2131 EKG          EKG time: 2013  Rhythm/Rate: Sinus tach, 107  P waves and NV: Normal  QRS, axis: Normal axis  ST and T waves: No acute ischemic  changes    Interpreted Contemporaneously by me, independently viewed  Similar compared to prior 2/24/2021       [JR]   2131 Chest x-ray independently interpreted in PACS.  There is no definite infiltrate or pleural effusion. [JR]   2140 Discussed with WESTON Isidro for A, who agrees to admit to telemetry bed on behalf of Dr. Mejia. [JR]      ED Course User Index  [JR] Lex Larsen MD              I wore an N95 mask, face shield, and gloves during this patient encounter.  Patient also wearing a surgical mask.  Hand hygeine performed before and after seeing the patient.    DIAGNOSIS  Final diagnoses:   Acute respiratory failure with hypoxia and hypercapnia (HCC)   Sepsis, due to unspecified organism, unspecified whether acute organ dysfunction present (HCC)   Gastrostomy tube dependent (HCC)   Abdominal wall cellulitis   Chronic obstructive pulmonary disease, unspecified COPD type (HCC)   Immobility syndrome         DISPOSITION  ADMIT            Latest Documented Vital Signs:  As of 22:35 EST  BP- 148/88 HR- 93 Temp- 99.4 °F (37.4 °C) (Tympanic) O2 sat- 92%        --    Please note that portions of this were completed with a voice recognition program.          Lex Larsen MD  11/16/22 [id number]

## 2022-11-17 NOTE — PLAN OF CARE
Problem: Skin Injury Risk Increased  Goal: Skin Health and Integrity  Intervention: Optimize Skin Protection  Recent Flowsheet Documentation  Taken 11/17/2022 0400 by Nell Streeter RN  Head of Bed (HOB) Positioning: HOB elevated  Taken 11/17/2022 0200 by Nell Streeter RN  Head of Bed (HOB) Positioning: HOB at 30-45 degrees  Taken 11/17/2022 0104 by Nell Streeter RN  Head of Bed (HOB) Positioning: HOB at 30-45 degrees   Goal Outcome Evaluation:   Admitted this shift from ER for resp failure w/ hypercapnia & sepsis. Pt is a LTC resident from [facility]. Pt remains on continuous bipap, tachycardic rate 110-150, tachypneic RR 35-40, and febrile at 100.4. safety sit at bedside for bipap compliance & attempting to pull out gtube. x1 dose of ativan given for anxiety. Lactate continues to increase despite multiple IVF Bolus, [facility] aware, orders for cont IVF initiated, bld cultures pending. Pt abdomen surrounding LUQ GTube is swollen, warm to touch and red. Remains NPO. Incontinent of B&B, skin care provided & Q2 turns.

## 2022-11-17 NOTE — PLAN OF CARE
Goal Outcome Evaluation:           Pt transfer from [facility] from nursing home. PPS 10%. Responsive to pain. Nonverbal at baseline. Pt is flaccid on r side. FC in place. 8L NC. Premedicating pt prior to turns with 1 mg dilaudid, 2mg ativan, and 0.4 robinul. Niece stopped by today. Will cont comfort care.

## 2022-11-17 NOTE — H&P
Patient Name:  Cam Gonsalves  YOB: 1945  MRN:  [id number]  Admit Date:  11/16/2022  Patient Care Team:  Enrique Carver MD as PCP - General (Family Medicine)      Subjective   History Present Illness     Chief Complaint   Patient presents with   • Shortness of Breath       Mr. Gonsalves is a 77 y.o. former smoker with a history of COPD, stroke, dysphagia, vascular dementia, type 2 diabetes mellitus, and chronic hepatitis c that presents to [facility] complaining of shortness of breath and respiratory distress. He is non-verbal secondary to a previous stroke so history is obtained from medical records.  Per ED notes, he was sent from his nursing home due to respiratory distress.  He was found to have an oxygen saturation of 80% when EMS arrived and he was placed on a non-rebreather. He was placed on BiPAP upon arrival to the ED and is being admitted for further evaluation.  The ED physician spoke with his niece who stated the patient is a DNR.      History of Present Illness  Review of Systems   Unable to perform ROS: Patient nonverbal        Personal History     Past Medical History:   Diagnosis Date   • Anxiety    • Aphasia    • BPH (benign prostatic hyperplasia)    • Cerebral infarction (HCC)    • Cervicalgia    • Chronic viral hepatitis C (HCC)    • COPD (chronic obstructive pulmonary disease) (HCC)    • Coronary artery disease    • Diabetes mellitus (HCC)    • Dysphagia, oropharyngeal phase    • Enterocolitis    • Gonzalez catheter in place    • G tube feedings (HCC)     JEVITY 1.2 AT 60 CC/HR   • Gastrointestinal hemorrhage    • Gastrostomy present (HCC)    • Gastrostomy tube in place (HCC)    • Hemiplegia and hemiparesis following cerebral infarction affecting right dominant side (HCC)    • History of COVID-19     11/2020   • History of MRSA infection    • History of sepsis    • Hydronephrosis    • Hyperlipidemia    • Hypertension    • Kidney failure, acute (HCC)    •  "Kidney stone    • Myocardial infarction (HCC)    • Other seizures (HCC)    • Pneumonitis    • Repeated falls    • Rhabdomyolysis    • Seizure (HCC)    • Stroke (HCC)    • Unspecified severe protein-calorie malnutrition (HCC)      Past Surgical History:   Procedure Laterality Date   • CYSTOSCOPY W/ URETERAL STENT PLACEMENT Left 2/25/2021    Procedure: CYSTOSCOPY left retrograde pyelogram, left URETERAL STENT INSERTION;  Surgeon: Rick Charles Jr., MD;  Location: [facility];  Service: Urology;  Laterality: Left;   • ENDOSCOPY W/ PEG TUBE PLACEMENT N/A 5/31/2021    Procedure: ESOPHAGOGASTRODUODENOSCOPY WITH PERCUTANEOUS ENDOSCOPIC GASTROSTOMY TUBE INSERTION;  Surgeon: Martell Ramirez Jr., MD;  Location: [facility] OR;  Service: General;  Laterality: N/A;   • GTUBE INSERTION     • URETEROSCOPY LASER LITHOTRIPSY WITH STENT INSERTION Left 3/25/2021    Procedure: CYSTOSCOPY URETEROSCOPY LASER LITHOTRIPSY STONE BASKET EXTRACION STENT EXCHANGE;  Surgeon: Rick Charles Jr., MD;  Location: [facility];  Service: Urology;  Laterality: Left;     No family history on file.  Social History     Tobacco Use   • Smoking status: Former   • Smokeless tobacco: Never   Substance Use Topics   • Alcohol use: Not Currently   • Drug use: Not Currently     Comment: unknown - per guardian he did use \"about everything\"     Medications Prior to Admission   Medication Sig Dispense Refill Last Dose   • Cholecalciferol (Vitamin D3) liquid 1,000 Units by Per G Tube route Daily.   11/16/2022   • clonazePAM (KlonoPIN) 0.25 MG disintegrating tablet Administer 1 tablet per G tube At Night As Needed for Anxiety. 3 tablet 0 Past Week   • HYDROcodone-acetaminophen (NORCO) 5-325 MG per tablet Administer 1 tablet per G tube 2 (two) times a day. 6 tablet 0 11/16/2022 at 0722   • rosuvastatin (CRESTOR) 10 MG tablet Administer 10 mg per G tube Daily.   11/16/2022 at 0433   • busPIRone (BUSPAR) 5 MG tablet Administer 5 mg per G tube 2 (Two) " Times a Day.      • Divalproex Sodium (DEPAKOTE SPRINKLE) 125 MG capsule Take 250 mg by mouth Every Night.      • Divalproex Sodium (DEPAKOTE SPRINKLE) 125 MG capsule Take 125 mg by mouth Every Morning.      • glipizide (GLUCOTROL) 5 MG tablet Administer 2.5 mg per G tube Daily.      • HYDROcodone-acetaminophen (NORCO) 5-325 MG per tablet Administer 1 tablet per G tube Daily As Needed for Moderate Pain .      • insulin regular (humuLIN R,novoLIN R) 100 UNIT/ML injection Inject 0-10 Units under the skin into the appropriate area as directed 3 (Three) Times a Day Before Meals. If -200 = 2 units, 201-250 = 4 units, 251-300 = 6 units, 301-350 = 8 units, 351-400 = 10 units, if greater than 400 or less than 60 notify MD   More than a month   • ipratropium-albuterol (DUO-NEB) 0.5-2.5 mg/3 ml nebulizer Take 3 mL by nebulization Every 4 (Four) Hours As Needed for Wheezing or Shortness of Air.      • lansoprazole (PREVACID) 30 MG capsule Take 30 mg by mouth Daily.      • melatonin 3 MG tablet Administer 3 mg per G tube Every Night.        Allergies:  No Known Allergies    Objective    Objective     Vital Signs  Temp:  [99.4 °F (37.4 °C)] 99.4 °F (37.4 °C)  Heart Rate:  [] 143  Resp:  [40] 40  BP: (144-156)/(87-93) 156/87  SpO2:  [91 %-100 %] 100 %  on   ;   Device (Oxygen Therapy): NPPV/NIV  Body mass index is 19.16 kg/m².    Physical Exam  Constitutional:       Appearance: He is ill-appearing.   HENT:      Head: Normocephalic and atraumatic.      Nose: Nose normal.      Mouth/Throat:      Mouth: Mucous membranes are dry.      Pharynx: Oropharynx is clear.   Eyes:      Extraocular Movements: Extraocular movements intact.      Conjunctiva/sclera: Conjunctivae normal.   Cardiovascular:      Rate and Rhythm: Normal rate and regular rhythm.      Pulses: Normal pulses.      Heart sounds: Normal heart sounds.   Pulmonary:      Effort: Tachypnea and accessory muscle usage present.      Breath sounds: Wheezing and rhonchi  (throughout) present.      Comments: BiPAP applied  Abdominal:      General: Bowel sounds are normal. There is no distension.      Palpations: Abdomen is soft. There is no mass.      Tenderness: There is no abdominal tenderness.      Hernia: No hernia is present.      Comments: G-tube in place   Musculoskeletal:         General: No swelling or tenderness.      Cervical back: Normal range of motion and neck supple.      Comments: Contracture of RUE   Skin:     General: Skin is warm and dry.      Comments: Erythema around g-tube site that extends to the right side of the abdomen   Neurological:      Mental Status: Mental status is at baseline. He is lethargic.      Cranial Nerves: Cranial nerve deficit present.      Motor: Weakness present.   Psychiatric:         Mood and Affect: Mood normal.         Behavior: Behavior normal.         Results Review:  I reviewed the patient's new clinical results.  I reviewed the patient's new imaging results and agree with the interpretation.  I reviewed the patient's other test results and agree with the interpretation  I personally viewed and interpreted the patient's EKG/Telemetry data  Discussed with ED provider.    Lab Results (last 24 hours)     Procedure Component Value Units Date/Time    CBC & Differential [[id number]]  (Abnormal) Collected: 11/16/22 2004    Specimen: Blood from Arm, Left Updated: 11/16/22 2038    Narrative:      The following orders were created for panel order CBC & Differential.  Procedure                               Abnormality         Status                     ---------                               -----------         ------                     CBC Auto Differential[[id number]]        Abnormal            Final result                 Please view results for these tests on the individual orders.    Comprehensive Metabolic Panel [[id number]]  (Abnormal) Collected: 11/16/22 2004    Specimen: Blood from Arm, Left Updated: 11/16/22 2055     Glucose 215 mg/dL       BUN 29 mg/dL      Creatinine 0.88 mg/dL      Sodium 138 mmol/L      Potassium 5.2 mmol/L      Chloride 103 mmol/L      CO2 23.9 mmol/L      Calcium 9.7 mg/dL      Total Protein 7.6 g/dL      Albumin 3.90 g/dL      ALT (SGPT) 15 U/L      AST (SGOT) 25 U/L      Alkaline Phosphatase 84 U/L      Total Bilirubin 0.2 mg/dL      Globulin 3.7 gm/dL      A/G Ratio 1.1 g/dL      BUN/Creatinine Ratio 33.0     Anion Gap 11.1 mmol/L      eGFR 88.0 mL/min/1.73      Comment: National Kidney Foundation and American Society of Nephrology (ASN) Task Force recommended calculation based on the Chronic Kidney Disease Epidemiology Collaboration (CKD-EPI) equation refit without adjustment for race.       Narrative:      GFR Normal >60  Chronic Kidney Disease <60  Kidney Failure <15    The GFR formula is only valid for adults with stable renal function between ages 18 and 70.    BNP [[id number]]  (Normal) Collected: 11/16/22 2004    Specimen: Blood from Arm, Left Updated: 11/16/22 2052     proBNP 644.0 pg/mL     Narrative:      Among patients with dyspnea, NT-proBNP is highly sensitive for the detection of acute congestive heart failure. In addition NT-proBNP of <300 pg/ml effectively rules out acute congestive heart failure with 99% negative predictive value.    Results may be falsely decreased if patient taking Biotin.      Troponin [[id number]]  (Normal) Collected: 11/16/22 2004    Specimen: Blood from Arm, Left Updated: 11/16/22 2055     Troponin T <0.010 ng/mL     Narrative:      Troponin T Reference Range:  <= 0.03 ng/mL-   Negative for AMI  >0.03 ng/mL-     Abnormal for myocardial necrosis.  Clinicians would have to utilize clinical acumen, EKG, Troponin and serial changes to determine if it is an Acute Myocardial Infarction or myocardial injury due to an underlying chronic condition.       Results may be falsely decreased if patient taking Biotin.      Blood Culture - Blood, Arm, Left [[id number]] Collected: 11/16/22 2004     "Specimen: Blood from Arm, Left Updated: 11/16/22 2012    Lactic Acid, Plasma [[id number]]  (Abnormal) Collected: 11/16/22 2004    Specimen: Blood from Arm, Left Updated: 11/16/22 2139     Lactate 2.3 mmol/L     Procalcitonin [[id number]]  (Normal) Collected: 11/16/22 2004    Specimen: Blood from Arm, Left Updated: 11/16/22 2052     Procalcitonin 0.11 ng/mL     Narrative:      As a Marker for Sepsis (Non-Neonates):    1. <0.5 ng/mL represents a low risk of severe sepsis and/or septic shock.  2. >2 ng/mL represents a high risk of severe sepsis and/or septic shock.    As a Marker for Lower Respiratory Tract Infections that require antibiotic therapy:    PCT on Admission    Antibiotic Therapy       6-12 Hrs later    >0.5                Strongly Recommended  >0.25 - <0.5        Recommended   0.1 - 0.25          Discouraged              Remeasure/reassess PCT  <0.1                Strongly Discouraged     Remeasure/reassess PCT    As 28 day mortality risk marker: \"Change in Procalcitonin Result\" (>80% or <=80%) if Day 0 (or Day 1) and Day 4 values are available. Refer to http://www.[employer]-pct-calculator.com    Change in PCT <=80%  A decrease of PCT levels below or equal to 80% defines a positive change in PCT test result representing a higher risk for 28-day all-cause mortality of patients diagnosed with severe sepsis for septic shock.    Change in PCT >80%  A decrease of PCT levels of more than 80% defines a negative change in PCT result representing a lower risk for 28-day all-cause mortality of patients diagnosed with severe sepsis or septic shock.       CBC Auto Differential [[id number]]  (Abnormal) Collected: 11/16/22 2004    Specimen: Blood from Arm, Left Updated: 11/16/22 2038     WBC 14.76 10*3/mm3      RBC 5.08 10*6/mm3      Hemoglobin 13.7 g/dL      Hematocrit 41.0 %      MCV 80.7 fL      MCH 27.0 pg      MCHC 33.4 g/dL      RDW 12.0 %      RDW-SD 34.9 fl      MPV 12.9 fL      Platelets 274 10*3/mm3      Neutrophil " % 78.9 %      Lymphocyte % 10.8 %      Monocyte % 8.8 %      Eosinophil % 0.4 %      Basophil % 0.2 %      Immature Grans % 0.9 %      Neutrophils, Absolute 11.64 10*3/mm3      Lymphocytes, Absolute 1.60 10*3/mm3      Monocytes, Absolute 1.30 10*3/mm3      Eosinophils, Absolute 0.06 10*3/mm3      Basophils, Absolute 0.03 10*3/mm3      Immature Grans, Absolute 0.13 10*3/mm3      nRBC 0.0 /100 WBC     Blood Culture - Blood, Arm, Left [[id number]] Collected: 11/16/22 2008    Specimen: Blood from Arm, Left Updated: 11/16/22 2012    Respiratory Panel PCR w/COVID-19(SARS-CoV-2) [facility] In-House, NP Swab in UTM/VTM, 3-4 HR TAT - Swab, Nasopharynx [[id number]]  (Normal) Collected: 11/16/22 2012    Specimen: Swab from Nasopharynx Updated: 11/16/22 2105     ADENOVIRUS, PCR Not Detected     Coronavirus 229E Not Detected     Coronavirus HKU1 Not Detected     Coronavirus NL63 Not Detected     Coronavirus OC43 Not Detected     COVID19 Not Detected     Human Metapneumovirus Not Detected     Human Rhinovirus/Enterovirus Not Detected     Influenza A PCR Not Detected     Influenza B PCR Not Detected     Parainfluenza Virus 1 Not Detected     Parainfluenza Virus 2 Not Detected     Parainfluenza Virus 3 Not Detected     Parainfluenza Virus 4 Not Detected     RSV, PCR Not Detected     Bordetella pertussis pcr Not Detected     Bordetella parapertussis PCR Not Detected     Chlamydophila pneumoniae PCR Not Detected     Mycoplasma pneumo by PCR Not Detected    Narrative:      In the setting of a positive respiratory panel with a viral infection PLUS a negative procalcitonin without other underlying concern for bacterial infection, consider observing off antibiotics or discontinuation of antibiotics and continue supportive care. If the respiratory panel is positive for atypical bacterial infection (Bordetella pertussis, Chlamydophila pneumoniae, or Mycoplasma pneumoniae), consider antibiotic de-escalation to target atypical  bacterial infection.    POC Glucose Once [[id number]]  (Abnormal) Collected: 11/16/22 2036    Specimen: Blood Updated: 11/16/22 2042     Glucose 164 mg/dL      Comment: Meter: [id number] : [id number] Terell Santana RN       Blood Gas, Arterial - [[id number]]  (Abnormal) Collected: 11/16/22 2059    Specimen: Arterial Blood Updated: 11/16/22 2104     Site Arterial: left radial     Ralf's Test Positive     pH, Arterial 7.270 pH units      Comment: sat 98% Avaps.  epaps 5 rate 16  min p 12  max p 25 Meter: [id number] Critical:Notify EUGENE Figueredo (16-Nov-22 21:02:14)Read back ok        pCO2, Arterial 58.3 mm Hg      Comment: Critical:Notify EUGENE Figueredo (16-Nov-22 21:02:14)Read back ok        pO2, Arterial 66.7 mm Hg      HCO3, Arterial 26.8 mmol/L      Base Excess, Arterial -1.3 mmol/L      O2 Saturation Calculated 89.5 %      A-a DO2 0.1 mmHg      Barometric Pressure for Blood Gas 757.7 mmHg      Modality BiPap     FIO2 100 %      Rate 36 Breaths/minute     STAT Lactic Acid, Reflex [[id number]]  (Abnormal) Collected: 11/16/22 2340    Specimen: Blood from Hand, Left Updated: 11/17/22 0045     Lactate 3.4 mmol/L     POC Glucose Once [[id number]]  (Abnormal) Collected: 11/16/22 2355    Specimen: Blood Updated: 11/16/22 2356     Glucose 173 mg/dL      Comment: Meter: [id number] : [id number] Terell Santaan RN             Imaging Results (Last 24 Hours)     Procedure Component Value Units Date/Time    CT Abdomen Pelvis With Contrast [[id number]] Collected: 11/16/22 2312     Updated: 11/16/22 2326    Narrative:      CT OF THE ABDOMEN AND PELVIS WITH CONTRAST     HISTORY: Abdominal wall erythema and warmth     COMPARISON: 09/24/2022     TECHNIQUE: Axial CT imaging was obtained through the abdomen and pelvis.  IV contrast was administered.     FINDINGS:  Study is severely compromised by motion artifact. Images through the  lung bases demonstrate bibasilar consolidation. Some of the appearance  may be related to  atelectasis, but pneumonia may also be present. No  suspicious hepatic lesions are seen. I cannot exclude cholelithiasis.  Spleen, pancreas, and adrenal glands appear normal. The patient has a  gastrostomy tube in place. I don't see any associated fluid collection  within the abdominal wall. No definite skin thickening is seen. The  kidneys enhance symmetrically. No hydronephrosis is seen. There is a  probable cyst within the superior pole of the left kidney. Multiple  areas of cortical thinning are seen within the left kidney, suggesting  the sequela of prior insults. While there is no hydronephrosis on the  left, the patient has multiple stones identified within the left kidney,  and within the left renal pelvis. The single largest is seen within the  left renal pelvis, and measures up to 1.4 cm. This was also present on  prior study, although it appears larger. Prostate gland is enlarged, and  contains dystrophic calcifications. Urinary bladder appears thick  walled. This may be related to incomplete distention, but correlation  with urinalysis and urine cultures is recommended. There is no bowel  obstruction. There is no evidence of appendicitis. The patient does have  some soft tissue stranding noted along the right lateral abdominal wall.  However, no associated fluid collection is seen. The stranding extends  into the soft tissues overlying the right hip. No acute osseous  abnormalities are seen. Dense calcification of the abdominal aorta is  noted. 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       Impression:         1. Bibasilar consolidation is nonspecific. Correlation with any evidence  of pneumonia is recommended.  2. Thick-walled appearance to the urinary bladder. Correlation with  urinalysis and urine cultures is recommended.  3. The patient does have soft tissue stranding within the right lateral  abdominal wall, and extending over the right hip. No  associated fluid  collection to suggest abscess is seen.  4.Multiple nonobstructing stones identified within the left kidney. A  stone at the left UPJ appears to have increased in size.     Radiation dose reduction techniques were utilized, including automated  exposure control and exposure modulation based on body size.     This report was finalized on 11/16/2022 11:23 PM by Dr. Rolanda Almaguer M.D.       XR Chest 1 View [[id number]] Collected: 11/16/22 2033     Updated: 11/16/22 2042    Narrative:      XR CHEST 1 VW-     HISTORY: Male who is 77 years-old,  short of breath     TECHNIQUE: Frontal view of the chest     COMPARISON: 10/30/2021     FINDINGS:  Tubing projects over the left upper and lower hemithorax, correlate  clinically. The heart size is borderline. Pulmonary vasculature is  unremarkable. Aorta appears tortuous. No focal pulmonary consolidation,  pleural effusion, or pneumothorax. No acute osseous process.       Impression:      No focal pulmonary consolidation. Borderline heart size.  Tortuous aorta. Follow-up as clinically indicated.     This report was finalized on 11/16/2022 8:35 PM by Dr. James Royal M.D.             Results for orders placed during the hospital encounter of 02/24/21    Adult transthoracic echo complete    Interpretation Summary  · The study is technically difficult for diagnosis. The quality of the study is limited due to an uncooperative patient, patient positioning and the patients arms had to be held out of the way to obtain images.  · Unable to assess overall regional wall motion; anterior apex, apex, inferoapex and apical lateral segments never well visualized  · I suspicious the apex, anterior apex, and inferoapex segments are hypokinetic; being able to repeat the study when patient is cooperative or been able to utilize ultrasound contrast agent would be helpful      ECG 12 Lead Rhythm Change   Preliminary Result   HEART RATE= 134  bpm   RR Interval= 448  ms    ID Interval= 141  ms   P Horizontal Axis= -15  deg   P Front Axis= 66  deg   QRSD Interval= 66  ms   QT Interval= 352  ms   QRS Axis= -31  deg   T Wave Axis= 208  deg   - ABNORMAL ECG -   Sinus tachycardia   Probable left atrial enlargement   Left axis deviation   Probable anterior infarct, age indeterminate   Prolonged QT interval   Electronically Signed By:    Date and Time of Study: 2022-11-16 23:20:47      ECG 12 Lead Dyspnea    (Results Pending)        Assessment/Plan     Active Hospital Problems    Diagnosis  POA   • **Acute respiratory failure with hypoxia and hypercapnia (HCC) [J96.01, J96.02]  Unknown   • Abdominal wall cellulitis [L03.311]  Unknown   • COPD with acute exacerbation (HCC) [J44.1]  Unknown   • Chronic viral hepatitis C (HCC) [B18.2]  Yes   • Vascular dementia (HCC) [F01.50]  Yes   • Sepsis (HCC) [A41.9]  Yes   • Type 2 diabetes mellitus with hyperglycemia (HCC) [E11.65]  Yes   • Hypertension [I10]  Yes   • Hyperlipidemia [E78.5]  Yes   • Dysphagia due to old stroke [I69.391]  Not Applicable       Acute Respiratory Failure with Hypoxia and Hypercapnia/COPD with Acute Exacerbation  -Most likely secondary to an acute exacerbation of COPD  -Pulmonology consult  -Currently on BiPAP  -Continue Solumedrol 60 mg IV q 12 H  -Scheduled and PRN Duonebs    Sepsis secondary to Cellulitis of Abdominal Wall  -CT A/P negative for an acute abscess  -Leukocytosis and lactic acidosis. He has been afebrile, procal ok  -Continue Vancomycin and Zosyn  -Blood cultures pending  -IVF  -Trend lactate, repeat lab work in AM    Type 2 Diabetes Mellitus  -Hold oral diabetic medications  -Initiate correctional factor insulin  -Monitor blood sugars ACHS  -Check hgb A1C    Vascular Dementia  -Continue depakote, buspirone, and clonazepam    Dysphagia  -Keep NPO  -Continue home tube feeds  -Nutrition consult    Chronic Hepatitis C  -LFTs stable  -Monitor    -I discussed the patients findings and my recommendations with  patient.    VTE Prophylaxis - SCDs.  Code Status - Full code.       WESTON Alfred  [facility]  11/16/22  23:59 EST

## 2022-11-18 PROBLEM — D50.9 IRON DEFICIENCY ANEMIA: Status: ACTIVE | Noted: 2022-01-01

## 2022-11-18 PROBLEM — J44.9 COLD (CHRONIC OBSTRUCTIVE LUNG DISEASE) (HCC): Status: ACTIVE | Noted: 2022-01-01

## 2022-11-18 PROBLEM — I63.9 ENCEPHALOMALACIA WITH CEREBRAL INFARCTION (HCC): Status: ACTIVE | Noted: 2022-01-01

## 2022-11-18 PROBLEM — G93.89 ENCEPHALOMALACIA WITH CEREBRAL INFARCTION: Status: ACTIVE | Noted: 2022-01-01

## 2022-11-18 PROBLEM — G31.1 SENILE DEGENERATION OF BRAIN (HCC): Status: ACTIVE | Noted: 2022-01-01

## 2022-11-18 PROBLEM — R47.01 EXPRESSIVE APHASIA: Status: ACTIVE | Noted: 2022-01-01

## 2022-11-18 PROBLEM — G81.94 LEFT HEMIPARESIS (HCC): Status: ACTIVE | Noted: 2022-01-01

## 2022-11-18 PROBLEM — Z86.73 HISTORY OF CVA (CEREBROVASCULAR ACCIDENT): Status: ACTIVE | Noted: 2022-01-01

## 2022-11-18 PROBLEM — I50.42 CHRONIC COMBINED SYSTOLIC AND DIASTOLIC CONGESTIVE HEART FAILURE (HCC): Status: ACTIVE | Noted: 2022-01-01

## 2022-11-18 PROBLEM — Z51.5 PALLIATIVE CARE BY SPECIALIST: Status: ACTIVE | Noted: 2022-01-01

## 2022-11-18 PROBLEM — G31.9 CEREBRAL ATROPHY (HCC): Status: ACTIVE | Noted: 2022-01-01

## 2022-11-18 PROBLEM — I67.9 CEREBROVASCULAR SMALL VESSEL DISEASE: Status: ACTIVE | Noted: 2022-01-01

## 2022-11-18 PROBLEM — F02.80 DEMENTIA DUE TO ANOTHER GENERAL MEDICAL CONDITION (HCC): Status: ACTIVE | Noted: 2022-01-01

## 2022-11-18 NOTE — CONSULTS
[facility] admit 11/17/2022  [facility] ID [id number]    Primary diagnosis for [facility]: COPD J44.9    Patient is needing symptom management for pain and SOA.    Spoke to niece on phone and provided explanation of services. Consents signed by email.     Thank you for the referral.    Elvira Munoz RN  [facility]  [phone]

## 2022-11-18 NOTE — DISCHARGE SUMMARY
Date of Admission:   11/16/2022  Date of Discharge:   11/18/2022    Patient Care Team:  Enrique Carver MD as PCP - General (Family Medicine)  Rosendo Dhillon MD as Attending Provider (Hospice and Palliative Medicine)    Discharge Diagnosis:     Sepsis (HCC)    COPD with acute exacerbation (HCC)    Acute respiratory failure with hypoxia and hypercapnia (HCC)    Abdominal wall cellulitis    Palliative care by specialist    Type 2 diabetes mellitus with hyperglycemia (HCC)    Vascular dementia (HCC)    Oropharyngeal dysphagia    Chronic combined systolic and diastolic congestive heart failure (HCC)    Encephalomalacia with cerebral infarction (HCC)    History of CVA (cerebrovascular accident) right MCA territory    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 male who presented to the ED 11/16/2022 for respiratory distress.  Patient appeared to be septic.  The family wished to proceed with comfort measures.  I was asked to evaluate the patient.  Please see my history and physical from earlier today at 1020 hrs.  Subsequently, hospice did meet with the patient's family and I was called that all agreed to discharge the patient from acute care and readmit him as a hospice scattered bed patient.    Procedures Performed: None       Consults:   Consults     No orders found from 10/18/2022 to 11/17/2022.          Pertinent Test Results: Reviewed    Condition on Discharge: Poor    Palliative Performance Scale  Palliative Performance Scale Score: 10%  [place] Symptom Assessment System Revised  Pain Score: no pain   ESAS Tiredness Score: Worst possible tiredness  ESAS Nausea  Score: No nausea  ESAS Depression Score: unable to assess  ESAS Anxiety Score: No anxiety  ESAS Drowsiness Score: Worst possible drowsiness  ESAS Lack of Appetite Score: Worst lack of appetite  ESAS Wellbeing Score: unable to assess  ESAS Dyspnea Score: 4  ESAS Other Problem Score: unable to assess  ESAS Source of Information: healthcare professional caregiver  ESAS Intervention: medicated/see MAR  ESAS Intervention Response: tolerated    Vital Signs  Temp:  [99.2 °F (37.3 °C)-99.8 °F (37.7 °C)] 99.2 °F (37.3 °C)  Heart Rate:  [113-119] 119  Resp:  [16-18] 18  BP: (106-128)/(68-74) 128/74  Device (Oxygen Therapy): humidified;nasal cannulaFlow (L/min):  [8] 8SpO2:  [100 %] 100 %    Physical Exam: Please see my history and physical for the patient's physical examination.     Discharge Disposition  Hospice/Medical Facility ([facility])    Discharge Medications: Same MAR      Discharge Diet: As tolerated      Activity at Discharge: As tolerated      Follow-up Appointments  No future appointments.      Test Results Pending at Discharge:   Pending Labs     Order Current Status    CANDIDA AURIS SCREEN - Swab, Axilla Right, Axilla Left and Groin In process    Blood Culture - Blood, Arm, Left Preliminary result    Blood Culture - Blood, Arm, Left Preliminary result           Rosendo Dhillon MD  11/18/22  18:39 EST    Time: I spent 70 minutes on the history and physical and discharge activity which included: face-to-face encounter with the patient, reviewing the data in the system, coordination of the care with the nursing staff as well as documentation and entering orders.

## 2022-11-18 NOTE — H&P
Palliative Care/Hospice Admit/Consult Note     Referring Provider: Horacio Rubi MD   Reason for Consultation: Palliative/Hospice Care  Date of Admission:  11/16/2022    Patient Care Team:  Enrique Carver MD as PCP - General (Family Medicine)  Rosendo Dhillon MD as Attending Provider (Hospice and Palliative Medicine)    Chief complaint:  COPD & Vascular Dementia    History of present illness:  The patient is a 77 y.o. male who presented to the ED 11/16/2022 for evaluation of respiratory distress.  He presented from nursing facility.  EMS reported that patient had room air oxygen saturation 80% prior to their arrival.  He was placed on nonrebreather prior to arrival with improvement of oxygen saturations.  He reportedly has a history of COPD, type 2 diabetes mellitus, chronic hepatitis C and vascular dementia with prior stroke and is nonverbal at baseline.  It is unknown exactly when symptoms began.  EMS reported patient was also tachycardic prior to arrival.  EMS noted some redness around the patient's G-tube site.  It is unknown when the G-tube was last replaced. EMS provided documentation that the patient is a DNR.  Upon admission, the primary team thought the patient was septic.  His lactic acid was going up despite antibiotics given in the ED.  Initially had plan to broaden antibiotic coverage with vancomycin and cefepime and consult general surgery for their evaluation.  However, after evaluation and discussion with family, the patient would not want aggressive interventions and would not want surgery and would not want drains and would not want any aggressive measures performed.    The primary team discussed his sepsis with increasing white blood cell count and lactic acid and potential transfer to the ICU for closer monitoring and higher level of care.    The discussed that even with higher level of care based on his frailty comorbidities and current presentation his likelihood of a poor  outcome was very high.  The family elected to pursue comfort measures and withdraw life-sustaining therapy.  He was transferred to [facility] with comfort medications initiated and antibiotics and life-sustaining medications withdrawn.    Hospice consult pending.  I was asked to assume the patient's care.    At the time of my evaluation, no family present.  The patient was lying on his left side and had some slight increase inspiratory effort with scattered rhonchi audible.  No ROS was obtainable from the patient.    Review of Systems  Review of systems could not be obtained due to   patient sedation status. patient unresponsive.    Palliative Performance Scale  Palliative Performance Scale Score: 10%  [place] Symptom Assessment System Revised  Pain Score: no pain   ESAS Tiredness Score: Worst possible tiredness  ESAS Nausea Score: No nausea  ESAS Depression Score: unable to assess  ESAS Anxiety Score: No anxiety  ESAS Drowsiness Score: Worst possible drowsiness  ESAS Lack of Appetite Score: Worst lack of appetite  ESAS Wellbeing Score: unable to assess  ESAS Dyspnea Score: 4  ESAS Other Problem Score: unable to assess  ESAS Source of Information: healthcare professional caregiver  ESAS Intervention: medicated/see MAR  ESAS Intervention Response: tolerated    History  Past Medical History:   Diagnosis Date   • Anxiety    • Aphasia    • BPH (benign prostatic hyperplasia)    • Cerebral infarction (HCC)    • Cervicalgia    • Chronic viral hepatitis C (HCC)    • COPD (chronic obstructive pulmonary disease) (HCC)    • Coronary artery disease    • Diabetes mellitus (HCC)    • Dysphagia, oropharyngeal phase    • Enterocolitis    • Gonzalez catheter in place    • G tube feedings (HCC)     JEVITY 1.2 AT 60 CC/HR   • Gastrointestinal hemorrhage    • Gastrostomy present (HCC)    • Gastrostomy tube in place (HCC)    • Hemiplegia and hemiparesis following cerebral infarction affecting right dominant side (HCC)    • History of COVID-19  "    11/2020   • History of MRSA infection    • History of sepsis    • Hydronephrosis    • Hyperlipidemia    • Hypertension    • Kidney failure, acute (HCC)    • Kidney stone    • Myocardial infarction (HCC)    • Other seizures (HCC)    • Pneumonitis    • Repeated falls    • Rhabdomyolysis    • Seizure (HCC)    • Stroke (HCC)    • Unspecified severe protein-calorie malnutrition (HCC)    ,   Past Surgical History:   Procedure Laterality Date   • CYSTOSCOPY W/ URETERAL STENT PLACEMENT Left 2/25/2021    Procedure: CYSTOSCOPY left retrograde pyelogram, left URETERAL STENT INSERTION;  Surgeon: Rick Charles Jr., MD;  Location: [facility];  Service: Urology;  Laterality: Left;   • ENDOSCOPY W/ PEG TUBE PLACEMENT N/A 5/31/2021    Procedure: ESOPHAGOGASTRODUODENOSCOPY WITH PERCUTANEOUS ENDOSCOPIC GASTROSTOMY TUBE INSERTION;  Surgeon: Martell Ramirez Jr., MD;  Location: [facility];  Service: General;  Laterality: N/A;   • GTUBE INSERTION     • URETEROSCOPY LASER LITHOTRIPSY WITH STENT INSERTION Left 3/25/2021    Procedure: CYSTOSCOPY URETEROSCOPY LASER LITHOTRIPSY STONE BASKET EXTRACION STENT EXCHANGE;  Surgeon: Rick Charles Jr., MD;  Location: [facility];  Service: Urology;  Laterality: Left;   , Family history: Mother diabetes and   Social History     Socioeconomic History   • Marital status:    Tobacco Use   • Smoking status: Former   • Smokeless tobacco: Never   Substance and Sexual Activity   • Alcohol use: Not Currently   • Drug use: Not Currently     Comment: unknown - per guardian he did use \"about everything\"   • Sexual activity: Not Currently     E-cigarette/Vaping     E-cigarette/Vaping Substances     E-cigarette/Vaping Devices        Allergy Patient has no known allergies.    Vital Signs   Temp:  [99.8 °F (37.7 °C)] 99.8 °F (37.7 °C)  Heart Rate:  [113] 113  Resp:  [16] 16  BP: (106)/(68) 106/68  Device (Oxygen Therapy): humidified;nasal cannulaFlow (L/min):  [8] 8SpO2:  [100 %] " 100 %    Physical Exam:  General Appearance:   Not awake and appears in no acute distress lying on his left side, chronically ill-appearing elderly male   Head:    Normocephalic, without obvious abnormality, atraumatic   Eyes:            Lids and lashes normal, conjunctivae and sclerae normal, no icterus   Ears:    Ears appear intact with no abnormalities noted   Throat:   No oral lesions, oral mucosa somewhat moist   Neck:   No adenopathy, supple, trachea midline, no thyromegaly   Back:     No scoliosis present   Lungs:     Clear to auscultation with scattered inspiratory rhonchi and mild continuous monophasic forced wheeze, respirations diminished with slight increase inspiratory effort    Heart:    Regular rhythm and tachycardia        Abdomen:   Occasional bowel sounds, soft and non-tender, non-distended, G-tube   Genitalia:    Deferred   Extremities:  No edema, pale and ashen no cyanosis    Pulses:  Radial pulses palpable and equal bilaterally   Skin:   No bleeding         Neurologic:  Not awake to test      Results Review:   I reviewed the patient's new clinical results.    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  At the time of my evaluation, no family present.  I reviewed with the RN.  I examined the patient.  With medications previously administered, the patient appears comfortable.  The patient is not receiving any routine scheduled medications.  The patient has received glycopyrrolate for airway congestion.  The patient has required 3 doses of 1 mg Dilaudid, 4 doses yesterday, and 3 doses of 2 mg Ativan, 4 doses yesterday, thus far today.  Medications will be continued and adjusted as needed for symptom management for comfort.  No attempts at resuscitation will be made.      Rosendo Dhillon MD  Hospice and Palliative Medicine  11/18/22  14:56 EST

## 2022-11-18 NOTE — PLAN OF CARE
Goal Outcome Evaluation:  Plan of Care Reviewed With: patient        Progress: declining  Outcome Evaluation: Patient with no acute changes, given 1mg IV Dilaudid, 2mg IV Ativan and 0.4mg IV robinul before repositioning and patient appears comfortable. Patient only minimally responsive to pain, no visitors overnight, will continue comfort measures.

## 2022-11-21 NOTE — PROGRESS NOTES
Case Management Discharge Note      Final Note: Admitted to a [facility] scattered bed on 11/18/22. OZZY lucero RN CCP.         Selected Continued Care - Discharged on 11/18/2022 Admission date: 11/16/2022 - Discharge disposition: Hospice/Medical Facility ([facility] - [facility])    Destination Coordination complete.    Service Provider Selected Services Address Phone Fax Patient Preferred    [facility] [address], [facility] [address] [phone] --          Durable Medical Equipment    No services have been selected for the patient.              Dialysis/Infusion    No services have been selected for the patient.              Home Medical Care    No services have been selected for the patient.              Therapy    No services have been selected for the patient.              Community Resources    No services have been selected for the patient.              Community & DME    No services have been selected for the patient.                       Final Discharge Disposition Code: 51 - hospice medical facility

## 2022-11-30 LAB — BACTERIA ISLT: NORMAL

## (undated) DEVICE — ADAPT CLN BIOGUARD AIR/H2O DISP

## (undated) DEVICE — KT ORCA ORCAPOD DISP STRL

## (undated) DEVICE — PRT BIOP SEALS

## (undated) DEVICE — CATH URETRL 2 LUM 10FR

## (undated) DEVICE — BNDR ABD 4PANEL 12IN MED/LG

## (undated) DEVICE — FIBR LASR HOLMIUM ACCUMAX 200 1PT USE

## (undated) DEVICE — PK URETSCP 40

## (undated) DEVICE — BITEBLOCK OMNI BLOC

## (undated) DEVICE — SENSR O2 OXIMAX FNGR A/ 18IN NONSTR

## (undated) DEVICE — NDL HYPO ECLPS SFTY 25G 1 1/2IN

## (undated) DEVICE — CANN O2 ETCO2 FITS ALL CONN CO2 SMPL A/ 7IN DISP LF

## (undated) DEVICE — GW PTFE FIX/CORE JB .035 15X150CM

## (undated) DEVICE — NITINOL WIRE WITH HYDROPHILIC TIP: Brand: SENSOR

## (undated) DEVICE — TBG GASTRO FLOW20 PUSH METHD 20F 4.47MM 260CM .035IN

## (undated) DEVICE — SYR LUERLOK 5CC

## (undated) DEVICE — SHEATH URETRL ACC NAVIGATOR 13/15F 36CM 5PK

## (undated) DEVICE — METER,URINE,400ML,DRAIN BAG,L/F,LL,SLIDE: Brand: MEDLINE

## (undated) DEVICE — TIDISHIELD UROLOGY DRAIN BAGS FROSTY VINYL STERILE FITS SIEMENS UROSKOP ACCESS 20 PER CASE: Brand: TIDISHIELD

## (undated) DEVICE — LOU CYSTO: Brand: MEDLINE INDUSTRIES, INC.

## (undated) DEVICE — CATH FOL COUDE TIEMAN 2WY 18F 5CC

## (undated) DEVICE — TUBING, SUCTION, 1/4" X 10', STRAIGHT: Brand: MEDLINE

## (undated) DEVICE — NITINOL STONE RETRIEVAL BASKET: Brand: ZERO TIP

## (undated) DEVICE — CATH URETRL FLXITP POLLACK STD 5F 70CM

## (undated) DEVICE — TBG FEED PULL FLOW20 NO/DRUG 20F 4.47MM 150CM

## (undated) DEVICE — GLV SURG BIOGEL LTX PF 7

## (undated) DEVICE — SYR LUERLOK 20CC BX/50